# Patient Record
Sex: FEMALE | Race: WHITE | NOT HISPANIC OR LATINO | Employment: UNEMPLOYED | URBAN - NONMETROPOLITAN AREA
[De-identification: names, ages, dates, MRNs, and addresses within clinical notes are randomized per-mention and may not be internally consistent; named-entity substitution may affect disease eponyms.]

---

## 2017-03-13 RX ORDER — AMLODIPINE BESYLATE AND BENAZEPRIL HYDROCHLORIDE 5; 20 MG/1; MG/1
CAPSULE ORAL
Qty: 30 CAPSULE | Refills: 5 | Status: SHIPPED | OUTPATIENT
Start: 2017-03-13 | End: 2017-05-17 | Stop reason: SDUPTHER

## 2017-03-14 ENCOUNTER — OFFICE VISIT (OUTPATIENT)
Dept: FAMILY MEDICINE CLINIC | Facility: CLINIC | Age: 52
End: 2017-03-14

## 2017-03-14 VITALS
SYSTOLIC BLOOD PRESSURE: 148 MMHG | DIASTOLIC BLOOD PRESSURE: 80 MMHG | HEIGHT: 60 IN | BODY MASS INDEX: 30.04 KG/M2 | WEIGHT: 153 LBS

## 2017-03-14 DIAGNOSIS — J06.9 ACUTE URI: ICD-10-CM

## 2017-03-14 DIAGNOSIS — M54.42 LOW BACK PAIN WITH BILATERAL SCIATICA, UNSPECIFIED BACK PAIN LATERALITY, UNSPECIFIED CHRONICITY: Primary | ICD-10-CM

## 2017-03-14 DIAGNOSIS — M54.41 LOW BACK PAIN WITH BILATERAL SCIATICA, UNSPECIFIED BACK PAIN LATERALITY, UNSPECIFIED CHRONICITY: Primary | ICD-10-CM

## 2017-03-14 PROCEDURE — 96372 THER/PROPH/DIAG INJ SC/IM: CPT | Performed by: FAMILY MEDICINE

## 2017-03-14 PROCEDURE — 99214 OFFICE O/P EST MOD 30 MIN: CPT | Performed by: FAMILY MEDICINE

## 2017-03-14 RX ORDER — AZITHROMYCIN 250 MG/1
TABLET, FILM COATED ORAL
Qty: 6 TABLET | Refills: 0 | Status: SHIPPED | OUTPATIENT
Start: 2017-03-14 | End: 2017-03-19

## 2017-03-14 RX ORDER — METHYLPREDNISOLONE ACETATE 80 MG/ML
80 INJECTION, SUSPENSION INTRA-ARTICULAR; INTRALESIONAL; INTRAMUSCULAR; SOFT TISSUE ONCE
Status: COMPLETED | OUTPATIENT
Start: 2017-03-14 | End: 2017-03-14

## 2017-03-14 RX ORDER — PREGABALIN 50 MG/1
50 CAPSULE ORAL 3 TIMES DAILY
Qty: 90 CAPSULE | Refills: 2 | Status: SHIPPED | OUTPATIENT
Start: 2017-03-14 | End: 2017-06-15 | Stop reason: SDUPTHER

## 2017-03-14 RX ORDER — ALBUTEROL SULFATE 90 UG/1
2 AEROSOL, METERED RESPIRATORY (INHALATION) EVERY 6 HOURS PRN
Qty: 1 INHALER | Refills: 3 | Status: SHIPPED | OUTPATIENT
Start: 2017-03-14 | End: 2017-08-28 | Stop reason: SDUPTHER

## 2017-03-14 RX ADMIN — METHYLPREDNISOLONE ACETATE 80 MG: 80 INJECTION, SUSPENSION INTRA-ARTICULAR; INTRALESIONAL; INTRAMUSCULAR; SOFT TISSUE at 11:58

## 2017-03-14 NOTE — PROGRESS NOTES
Subjective   Isabel Villalba is a 51 y.o. female.  Patient is here today for refills of medication and follow-up.  She would like referral to pain management.  She was recently discharged after having been in pain management for about a year in [place].  She's had no recent was given on the latter and she is not sure why this occurred.  She still has chronic daily low back pain and would like to refill her meds today including the muscle relaxer in the Lyrica    She also has an upper respiratory infection for about a week with cough wheezing congestion and drainage.  Cough is yellowish sputum at times.  There's been no fever    History of Present Illness     The following portions of the patient's history were reviewed and updated as appropriate: allergies, current medications, past family history, past medical history, past social history, past surgical history and problem list.    Review of Systems   Constitutional: Positive for fatigue.   HENT: Positive for congestion, postnasal drip, rhinorrhea, sinus pressure and sore throat.    Respiratory: Positive for cough and wheezing. Negative for shortness of breath.    Cardiovascular: Negative.  Negative for chest pain.   Gastrointestinal: Negative.    Musculoskeletal: Positive for arthralgias, back pain and myalgias.   Skin: Negative.    Allergic/Immunologic: Negative for immunocompromised state.   Neurological: Negative for dizziness, tremors, seizures, syncope, weakness and numbness.   Hematological: Negative.    Psychiatric/Behavioral: Negative for agitation, confusion, dysphoric mood and sleep disturbance. The patient is not nervous/anxious.        Objective   Physical Exam   Constitutional: She is oriented to person, place, and time. She appears well-developed and well-nourished.   HENT:   Head: Normocephalic and atraumatic.   Nose: Nose normal.   Mouth/Throat: Oropharynx is clear and moist.   Tympanic membranes mildly retracted and opaque, nasal mucosa  mildly red and swollen with posterior pharyngeal lymphoid hyperplasia   Eyes: Conjunctivae and EOM are normal. Pupils are equal, round, and reactive to light.   Neck: Normal range of motion. Neck supple. No JVD present. No tracheal deviation present. No thyromegaly present.   Cardiovascular: Normal rate, regular rhythm, normal heart sounds and intact distal pulses.    No murmur heard.  Pulmonary/Chest: Effort normal and breath sounds normal. She has no wheezes.   Abdominal: Soft. Bowel sounds are normal. She exhibits no distension. There is no tenderness.   Musculoskeletal: Normal range of motion. She exhibits no edema.   Lymphadenopathy:     She has no cervical adenopathy.   Neurological: She is alert and oriented to person, place, and time. Coordination normal.   Skin: Skin is warm and dry. No rash noted.   Psychiatric: She has a normal mood and affect.   Nursing note and vitals reviewed.      Assessment/Plan   Isabel was seen today for med refill.    Diagnoses and all orders for this visit:    Low back pain with bilateral sciatica, unspecified back pain laterality, unspecified chronicity  -     Ambulatory Referral to Pain Management  -     methylPREDNISolone acetate (DEPO-medrol) injection 80 mg; Inject 1 mL into the shoulder, thigh, or buttocks 1 (One) Time.    Acute URI    Other orders  -     azithromycin (ZITHROMAX) 250 MG tablet; Take 2 tablets the first day, then 1 tablet daily for 4 days.  -     albuterol (VENTOLIN HFA) 108 (90 BASE) MCG/ACT inhaler; Inhale 2 puffs Every 6 (Six) Hours As Needed for Wheezing or shortness of air.  -     pregabalin (LYRICA) 50 MG capsule; Take 1 capsule by mouth 3 (Three) Times a Day.     pain management referral was made and Depo-Medrol was given today for the pain.  We'll give Lyrica and Zanaflex refills as well    Give Zithromax and albuterol for the upper respiratory symptoms and contact me if not improving within 3-5 days.  She is a long-term smoker and I suspect some  degree of COPD is contributing.

## 2017-03-23 ENCOUNTER — OFFICE VISIT (OUTPATIENT)
Dept: SURGERY | Facility: CLINIC | Age: 52
End: 2017-03-23

## 2017-03-23 VITALS
HEIGHT: 60 IN | WEIGHT: 155 LBS | BODY MASS INDEX: 30.43 KG/M2 | DIASTOLIC BLOOD PRESSURE: 84 MMHG | SYSTOLIC BLOOD PRESSURE: 142 MMHG

## 2017-03-23 DIAGNOSIS — Z87.898 HX OF ABNORMAL MAMMOGRAM: Primary | ICD-10-CM

## 2017-03-23 PROCEDURE — 99212 OFFICE O/P EST SF 10 MIN: CPT | Performed by: SURGERY

## 2017-03-30 NOTE — PROGRESS NOTES
CHIEF COMPLAINT:    3 month Follow up from left breast biopsy    HISTORY OF PRESENT ILLNESS:    Isabel Villalba is a 51 y.o. female who underwent a left breast mammotome biopsy in November.  That biopsy showed cystic mastopathy with microcalcifications.  She did well post biopsy.  She is here for review of her follow-up imaging.  Left sided mammogram was interpreted as BI-RADS 2, and on review shows appropriate clip position as well.    The patient has no new breast related complaints.  She has noted no changes in her breasts, no pain, no discharge.    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   • Epigastric pain    • Essential hypertension    • Fatigue    • Gastritis    • Gastroesophageal reflux disease    • Gouty arthropathy     left first MTP joint   • H/O screening mammography 06/24/2014   • Hernia of abdominal wall    • Hyperlipidemia    • Irreducible incisional hernia     vs lipoma   • Lumbago with sciatica    • Lumbago-sciatica due to displacement of lumbar intervertebral disc    • Protrusion of cervical intervertebral disc     Broad based intervertebral disc protrusion - C5-6, minimally impressing upon ventral thecal sac by MRI done 4/7/15    • Pruritic rash    • Systolic murmur    • Vitamin B12 deficiency    • Weight increase        Past Surgical History:   Procedure Laterality Date   • BREAST BIOPSY     • CARPAL TUNNEL RELEASE Bilateral 1995   • CHOLECYSTECTOMY  2004   • INJECTION OF MEDICATION  11/17/2015    Bicillin LA 1.2   • INJECTION OF MEDICATION  11/17/2015    depo medrol   • TUBAL ABDOMINAL LIGATION  2004         Current Outpatient Prescriptions:   •  albuterol (VENTOLIN HFA) 108 (90 BASE) MCG/ACT inhaler, Inhale 2 puffs Every 6 (Six) Hours As Needed for Wheezing or shortness of air., Disp: 1 inhaler, Rfl: 3  •  amLODIPine-benazepril (LOTREL 5-20) 5-20 MG per capsule, TAKE 1 CAPSULE BY MOUTH  DAILY., Disp: 30 capsule, Rfl: 5  •  benzonatate (TESSALON) 100 MG capsule, Take 100 mg by mouth 3 (Three) Times a Day As Needed for cough., Disp: , Rfl:   •  fluticasone (FLONASE) 50 MCG/ACT nasal spray, 2 sprays into each nostril Daily., Disp: , Rfl:   •  HYDROcodone-acetaminophen (NORCO) 5-325 MG per tablet, Take 1 tablet by mouth Every 6 (Six) Hours As Needed., Disp: , Rfl:   •  loratadine (CLARITIN) 10 MG tablet, Take 10 mg by mouth Daily., Disp: , Rfl:   •  meloxicam (MOBIC) 15 MG tablet, Take 15 mg by mouth Daily., Disp: , Rfl:   •  omeprazole (PriLOSEC) 20 MG capsule, Take 20 mg by mouth 2 (Two) Times a Day., Disp: , Rfl:   •  pregabalin (LYRICA) 50 MG capsule, Take 1 capsule by mouth 3 (Three) Times a Day., Disp: 90 capsule, Rfl: 2  •  tiZANidine (ZANAFLEX) 4 MG tablet, Take 4 mg by mouth Every 6 (Six) Hours As Needed for muscle spasms., Disp: , Rfl:     Allergies   Allergen Reactions   • Morphine And Related Nausea And Vomiting       Family History   Problem Relation Age of Onset   • ADD / ADHD Other    • Bipolar disorder Other    • Breast cancer Other    • Cancer Other    • Coronary artery disease Other    • Diabetes Other    • Heart disease Other    • Hyperlipidemia Other    • Hypertension Other    • Lung cancer Other    • Migraines Other    • Polycystic ovary syndrome Other    • Schizophrenia Other    • Stroke Other    • Tuberculosis Other    • Other Other      respiratory disorder; smoking tobacco   • Breast cancer Paternal Grandmother        Social History     Social History   • Marital status:      Spouse name: N/A   • Number of children: N/A   • Years of education: N/A     Occupational History   • Not on file.     Social History Main Topics   • Smoking status: Current Every Day Smoker     Packs/day: 1.00     Types: Cigarettes   • Smokeless tobacco: Never Used   • Alcohol use No   • Drug use: No   • Sexual activity: Not on file     Other Topics Concern   • Not on file     Social History  "Narrative       Review of Systems   Constitutional: Negative for appetite change, chills, fever and unexpected weight change.   HENT: Negative for hearing loss, nosebleeds and trouble swallowing.    Eyes: Negative for visual disturbance.   Respiratory: Negative for apnea, cough, choking, chest tightness, shortness of breath, wheezing and stridor.    Cardiovascular: Negative for chest pain, palpitations and leg swelling.   Gastrointestinal: Negative for abdominal distention, abdominal pain, blood in stool, constipation, diarrhea, nausea and vomiting.   Endocrine: Negative for cold intolerance, heat intolerance, polydipsia, polyphagia and polyuria.   Genitourinary: Negative for difficulty urinating, dysuria, frequency, hematuria and urgency.   Musculoskeletal: Negative for arthralgias, back pain, myalgias and neck pain.   Skin: Negative for color change, pallor and rash.   Allergic/Immunologic: Negative for immunocompromised state.   Neurological: Negative for dizziness, seizures, syncope, light-headedness, numbness and headaches.   Hematological: Negative for adenopathy.   Psychiatric/Behavioral: Negative for suicidal ideas. The patient is not nervous/anxious.        Objective     /84  Ht 60\" (152.4 cm)  Wt 155 lb (70.3 kg)  BMI 30.27 kg/m2    Physical Exam   Constitutional: She is oriented to person, place, and time. She appears well-developed and well-nourished. No distress.   HENT:   Head: Normocephalic and atraumatic.   Eyes: Conjunctivae and EOM are normal. Pupils are equal, round, and reactive to light. Right eye exhibits no discharge. Left eye exhibits no discharge.   Neck: Normal range of motion. Neck supple. No JVD present. No tracheal deviation present. No thyromegaly present.   Cardiovascular: Normal rate, regular rhythm and normal heart sounds.  Exam reveals no gallop and no friction rub.    No murmur heard.  Pulmonary/Chest: Effort normal and breath sounds normal. No respiratory distress. She " has no wheezes. She has no rales. She exhibits no tenderness.   Abdominal: Soft. She exhibits no distension and no mass. There is no tenderness. There is no rebound and no guarding. No hernia.   Musculoskeletal: Normal range of motion. She exhibits no edema, tenderness or deformity.   Neurological: She is alert and oriented to person, place, and time. No cranial nerve deficit.   Skin: Skin is warm and dry. No rash noted. She is not diaphoretic. No erythema. No pallor.   Psychiatric: She has a normal mood and affect. Her behavior is normal. Judgment and thought content normal.       DIAGNOSTIC DATA:    Mammogram and report reviewed showing biopsy site with clip in place, benign findings.    ASSESSMENT:    3 month follow-up from a benign breast biopsy    PLAN:    Her repeat imaging and previous pathology were reviewed with her today.  Encouraged continued breast exams at home.  She will return to annual mammogram for screening purposes.  She can follow-up with me if needed based on these results.          This document has been electronically signed by Jeancarlos Ojeda MD on March 30, 2017 3:37 PM

## 2017-05-17 RX ORDER — AMLODIPINE BESYLATE AND BENAZEPRIL HYDROCHLORIDE 5; 20 MG/1; MG/1
CAPSULE ORAL
Qty: 30 CAPSULE | Refills: 5 | Status: SHIPPED | OUTPATIENT
Start: 2017-05-17 | End: 2017-08-28 | Stop reason: SDUPTHER

## 2017-05-18 RX ORDER — OMEPRAZOLE 20 MG/1
20 CAPSULE, DELAYED RELEASE ORAL 2 TIMES DAILY
Qty: 60 CAPSULE | Refills: 5 | Status: SHIPPED | OUTPATIENT
Start: 2017-05-18 | End: 2017-08-28 | Stop reason: SDUPTHER

## 2017-05-18 RX ORDER — TIZANIDINE 4 MG/1
4 TABLET ORAL EVERY 6 HOURS PRN
Qty: 30 TABLET | Refills: 2 | Status: SHIPPED | OUTPATIENT
Start: 2017-05-18 | End: 2017-06-15 | Stop reason: SDUPTHER

## 2017-06-15 ENCOUNTER — LAB (OUTPATIENT)
Dept: LAB | Facility: OTHER | Age: 52
End: 2017-06-15

## 2017-06-15 ENCOUNTER — OFFICE VISIT (OUTPATIENT)
Dept: FAMILY MEDICINE CLINIC | Facility: CLINIC | Age: 52
End: 2017-06-15

## 2017-06-15 VITALS
WEIGHT: 155 LBS | SYSTOLIC BLOOD PRESSURE: 138 MMHG | HEIGHT: 60 IN | DIASTOLIC BLOOD PRESSURE: 80 MMHG | BODY MASS INDEX: 30.43 KG/M2

## 2017-06-15 DIAGNOSIS — E78.5 HYPERLIPIDEMIA, UNSPECIFIED HYPERLIPIDEMIA TYPE: ICD-10-CM

## 2017-06-15 DIAGNOSIS — E53.8 VITAMIN B12 DEFICIENCY: ICD-10-CM

## 2017-06-15 DIAGNOSIS — I10 ESSENTIAL HYPERTENSION: ICD-10-CM
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
## 2017-06-15 DIAGNOSIS — E78.5 HYPERLIPIDEMIA, UNSPECIFIED HYPERLIPIDEMIA TYPE: Primary | ICD-10-CM

## 2017-06-15 DIAGNOSIS — I10 ESSENTIAL HYPERTENSION: Primary | ICD-10-CM

## 2017-06-15 DIAGNOSIS — M25.50 MULTIPLE JOINT PAIN: ICD-10-CM

## 2017-06-15 LAB
ALBUMIN SERPL-MCNC: 4.3 G/DL (ref 3.2–5.5)
ALBUMIN/GLOB SERPL: 1.2 G/DL (ref 1–3)
ALP SERPL-CCNC: 61 U/L (ref 15–121)
ALT SERPL W P-5'-P-CCNC: 34 U/L (ref 10–60)
ANION GAP SERPL CALCULATED.3IONS-SCNC: 9 MMOL/L (ref 5–15)
AST SERPL-CCNC: 29 U/L (ref 10–60)
BASOPHILS # BLD AUTO: 0.03 10*3/MM3 (ref 0–0.2)
BASOPHILS NFR BLD AUTO: 0.3 % (ref 0–2)
BILIRUB SERPL-MCNC: 0.4 MG/DL (ref 0.2–1)
BUN BLD-MCNC: 7 MG/DL (ref 8–25)
BUN/CREAT SERPL: 14 (ref 7–25)
CALCIUM SPEC-SCNC: 9.6 MG/DL (ref 8.4–10.8)
CHLORIDE SERPL-SCNC: 104 MMOL/L (ref 100–112)
CHOLEST SERPL-MCNC: 288 MG/DL (ref 150–200)
CO2 SERPL-SCNC: 28 MMOL/L (ref 20–32)
CREAT BLD-MCNC: 0.5 MG/DL (ref 0.4–1.3)
DEPRECATED RDW RBC AUTO: 42 FL (ref 36.4–46.3)
EOSINOPHIL # BLD AUTO: 0.04 10*3/MM3 (ref 0–0.7)
EOSINOPHIL NFR BLD AUTO: 0.4 % (ref 0–7)
ERYTHROCYTE [DISTWIDTH] IN BLOOD BY AUTOMATED COUNT: 13.6 % (ref 11.5–14.5)
GFR SERPL CREATININE-BSD FRML MDRD: 130 ML/MIN/1.73 (ref 51–120)
GLOBULIN UR ELPH-MCNC: 3.7 GM/DL (ref 2.5–4.6)
GLUCOSE BLD-MCNC: 118 MG/DL (ref 70–100)
HCT VFR BLD AUTO: 43.2 % (ref 35–45)
HDLC SERPL-MCNC: 33 MG/DL (ref 35–100)
HGB BLD-MCNC: 14.5 G/DL (ref 12–15.5)
LDLC SERPL CALC-MCNC: 185 MG/DL
LDLC/HDLC SERPL: 5.6 {RATIO}
LYMPHOCYTES # BLD AUTO: 3.28 10*3/MM3 (ref 0.6–4.2)
LYMPHOCYTES NFR BLD AUTO: 35.5 % (ref 10–50)
MCH RBC QN AUTO: 29.1 PG (ref 26.5–34)
MCHC RBC AUTO-ENTMCNC: 33.6 G/DL (ref 31.4–36)
MCV RBC AUTO: 86.7 FL (ref 80–98)
MONOCYTES # BLD AUTO: 0.7 10*3/MM3 (ref 0–0.9)
MONOCYTES NFR BLD AUTO: 7.6 % (ref 0–12)
NEUTROPHILS # BLD AUTO: 5.2 10*3/MM3 (ref 2–8.6)
NEUTROPHILS NFR BLD AUTO: 56.2 % (ref 37–80)
PLATELET # BLD AUTO: 351 10*3/MM3 (ref 150–450)
PMV BLD AUTO: 9.5 FL (ref 8–12)
POTASSIUM BLD-SCNC: 4 MMOL/L (ref 3.4–5.4)
PROT SERPL-MCNC: 8 G/DL (ref 6.7–8.2)
RBC # BLD AUTO: 4.98 10*6/MM3 (ref 3.77–5.16)
SODIUM BLD-SCNC: 141 MMOL/L (ref 134–146)
TRIGL SERPL-MCNC: 351 MG/DL (ref 35–160)
VLDLC SERPL-MCNC: 70.2 MG/DL
WBC NRBC COR # BLD: 9.25 10*3/MM3 (ref 3.2–9.8)

## 2017-06-15 PROCEDURE — 82746 ASSAY OF FOLIC ACID SERUM: CPT | Performed by: FAMILY MEDICINE

## 2017-06-15 PROCEDURE — 84439 ASSAY OF FREE THYROXINE: CPT | Performed by: FAMILY MEDICINE

## 2017-06-15 PROCEDURE — 82607 VITAMIN B-12: CPT | Performed by: FAMILY MEDICINE

## 2017-06-15 PROCEDURE — 86618 LYME DISEASE ANTIBODY: CPT | Performed by: FAMILY MEDICINE

## 2017-06-15 PROCEDURE — 85025 COMPLETE CBC W/AUTO DIFF WBC: CPT | Performed by: FAMILY MEDICINE

## 2017-06-15 PROCEDURE — 84443 ASSAY THYROID STIM HORMONE: CPT | Performed by: FAMILY MEDICINE

## 2017-06-15 PROCEDURE — 80053 COMPREHEN METABOLIC PANEL: CPT | Performed by: FAMILY MEDICINE

## 2017-06-15 PROCEDURE — 80061 LIPID PANEL: CPT | Performed by: FAMILY MEDICINE

## 2017-06-15 PROCEDURE — 99214 OFFICE O/P EST MOD 30 MIN: CPT | Performed by: FAMILY MEDICINE

## 2017-06-15 RX ORDER — PREGABALIN 50 MG/1
50 CAPSULE ORAL 3 TIMES DAILY
Qty: 90 CAPSULE | Refills: 2 | Status: SHIPPED | OUTPATIENT
Start: 2017-06-15 | End: 2018-04-10

## 2017-06-15 RX ORDER — ATORVASTATIN CALCIUM 10 MG/1
10 TABLET, FILM COATED ORAL DAILY
Qty: 30 TABLET | Refills: 5 | Status: SHIPPED | OUTPATIENT
Start: 2017-06-15 | End: 2017-08-28 | Stop reason: SDUPTHER

## 2017-06-15 RX ORDER — TIZANIDINE 4 MG/1
4 TABLET ORAL EVERY 6 HOURS PRN
Qty: 30 TABLET | Refills: 2 | Status: SHIPPED | OUTPATIENT
Start: 2017-06-15 | End: 2017-08-28 | Stop reason: SDUPTHER

## 2017-06-15 NOTE — PROGRESS NOTES
Please call the patient regarding her abnormal result.  Cholesterol is very high.  I recommend starting medication.  Send atorvastatin 10 mg daily, recheck in 3 months for CMP and an LDL

## 2017-06-15 NOTE — PROGRESS NOTES
Subjective   Isabel Villalba is a 51 y.o. female.  Patient is here today for refills of medication and follow-up.  She is scheduled with pain management in [place] in a couple weeks.  She still has chronic daily low back pain and would like to refill her meds today including the muscle relaxer and the Lyrica.   She has also had several tick bites and has had increasing joint pain and some constitutional symptoms.    History of Present Illness   Back Pain   This is a chronic problem. The current episode started more than 1 year ago. The problem occurs constantly. The problem is unchanged. The pain is present in the lumbar spine. The quality of the pain is described as shooting, stabbing and aching. The pain radiates down the legs. The pain is at a severity of 8/10. The pain is severe. The pain is the same all the time. The symptoms are aggravated by standing, twisting, position, bending and sitting. Stiffness is present at night, all day and in the morning. Associated symptoms include leg pain and tingling. Pertinent negatives include no abdominal pain, bladder incontinence, bowel incontinence, chest pain, dysuria, fever, headaches, numbness, paresis, paresthesias, pelvic pain, perianal numbness, weakness or weight loss. Risk factors include sedentary lifestyle. Patient has tried analgesics, NSAIDs, muscle relaxant and heat for the symptoms. The treatment provided mild relief.     The following portions of the patient's history were reviewed and updated as appropriate: allergies, current medications, past family history, past medical history, past social history, past surgical history and problem list.    Review of Systems   Constitutional: Positive for fatigue.   HENT: Positive for congestion, postnasal drip, rhinorrhea, sinus pressure and sore throat.    Respiratory: Positive for cough and wheezing. Negative for shortness of breath.    Cardiovascular: Negative.  Negative for chest pain.    Gastrointestinal: Negative.    Musculoskeletal: Positive for arthralgias, back pain and myalgias.   Skin: Negative.    Allergic/Immunologic: Negative for immunocompromised state.   Neurological: Negative for dizziness, tremors, seizures, syncope, weakness and numbness.   Hematological: Negative.    Psychiatric/Behavioral: Negative for agitation, confusion, dysphoric mood and sleep disturbance. The patient is not nervous/anxious.      Objective   Physical Exam   Constitutional: She is oriented to person, place, and time. She appears well-developed and well-nourished.   HENT:   Head: Normocephalic and atraumatic.   Nose: Nose normal.   Mouth/Throat: Oropharynx is clear and moist.   Tympanic membranes mildly retracted and opaque, nasal mucosa mildly red and swollen with posterior pharyngeal lymphoid hyperplasia   Eyes: Conjunctivae and EOM are normal. Pupils are equal, round, and reactive to light.   Neck: Normal range of motion. Neck supple. No JVD present. No tracheal deviation present. No thyromegaly present.   Cardiovascular: Normal rate, regular rhythm, normal heart sounds and intact distal pulses.    No murmur heard.  Pulmonary/Chest: Effort normal and breath sounds normal. She has no wheezes.   Abdominal: Soft. Bowel sounds are normal. She exhibits no distension. There is no tenderness.   Musculoskeletal: Normal range of motion. She exhibits no edema.   Lymphadenopathy:     She has no cervical adenopathy.   Neurological: She is alert and oriented to person, place, and time. Coordination normal.   Skin: Skin is warm and dry. No rash noted.   Psychiatric: She has a normal mood and affect.   Nursing note and vitals reviewed.    Assessment/Plan   Isabel was seen today for med refill.    Diagnoses and all orders for this visit:    Essential hypertension  -     CBC & Differential; Future  -     Comprehensive Metabolic Panel    Hyperlipidemia, unspecified hyperlipidemia type  -     T4, Free  -     TSH  -     Lipid  Panel; Future    Vitamin B12 deficiency  -     Vitamin B12 & Folate    Tick bites, subsequent encounter  -     Cancel: Lyme IgG / IgM Ab  -     [facility] (IgG / M)  -     Lyme, Total Antibody Test / Reflex; Future    Multiple joint pain    Other orders  -     pregabalin (LYRICA) 50 MG capsule; Take 1 capsule by mouth 3 (Three) Times a Day.  -     tiZANidine (ZANAFLEX) 4 MG tablet; Take 1 tablet by mouth Every 6 (Six) Hours As Needed for Muscle Spasms.    We'll give Lyrica and Zanaflex refills     Follow up with pain management as scheduled.     She is due for labs and will include a tick panel as above.     The patient has read and signed the [facility] Controlled Substance Contract.  I will continue to see patient for regular follow up appointments. Patient is well controlled on the medication.  DEYVI has been reviewed by me and is updated every 3 months. The patient is aware of the potential for addiction and dependence.

## 2017-06-16 LAB
FOLATE SERPL-MCNC: 5.03 NG/ML (ref 2.76–21)
T4 FREE SERPL-MCNC: 1.09 NG/DL (ref 0.78–2.19)
TSH SERPL DL<=0.05 MIU/L-ACNC: 0.83 MIU/ML (ref 0.46–4.68)
VIT B12 BLD-MCNC: 476 PG/ML (ref 239–931)

## 2017-06-17 LAB — B BURGDOR IGG+IGM SER-ACNC: <0.91 ISR (ref 0–0.9)

## 2017-06-20 LAB
R RICKETTSI IGM TITR SER: 0.54 INDEX (ref 0–0.89)
RICK SF IGG TITR SER IF: NEGATIVE {TITER}

## 2017-07-25 ENCOUNTER — OFFICE VISIT (OUTPATIENT)
Dept: PAIN MEDICINE | Facility: CLINIC | Age: 52
End: 2017-07-25

## 2017-07-25 VITALS
BODY MASS INDEX: 30.77 KG/M2 | WEIGHT: 156.7 LBS | HEIGHT: 60 IN | SYSTOLIC BLOOD PRESSURE: 140 MMHG | DIASTOLIC BLOOD PRESSURE: 80 MMHG

## 2017-07-25 DIAGNOSIS — M54.41 CHRONIC BILATERAL LOW BACK PAIN WITH BILATERAL SCIATICA: Primary | ICD-10-CM

## 2017-07-25 DIAGNOSIS — G89.29 CHRONIC BILATERAL LOW BACK PAIN WITH BILATERAL SCIATICA: Primary | ICD-10-CM

## 2017-07-25 DIAGNOSIS — M79.18 MYOFASCIAL PAIN: ICD-10-CM

## 2017-07-25 DIAGNOSIS — M54.42 CHRONIC BILATERAL LOW BACK PAIN WITH BILATERAL SCIATICA: Primary | ICD-10-CM

## 2017-07-25 PROCEDURE — 99204 OFFICE O/P NEW MOD 45 MIN: CPT | Performed by: PAIN MEDICINE

## 2017-07-25 NOTE — PROGRESS NOTES
"Isabel Villalba is a 51 y.o. female.   1965    HPI:   Location: lower back, bilateral leg and left foot  Quality: burning, stabbing, shooting and aching  Severity: 8/10  Timing: constant  Alleviating: nothing  Aggravating: increased activity, weather and anything to long     Low back pain with bilateral burning in what appears to be an approx L4 distribution.  Has had an MRI in the past which showed mild to mod NF stenosis at l5-s1. States it is worse and constant now.  This started after she bent over to  something in the yard.  This was in approx 2014 (when last mri was obtained).  PT within the last year which did not help much.  Still does exercises learned in PT approx 3 times a week.  No chiropractic for this.  Saw a pain clinic in [place] and was discharged because of \"non-compliance\".  Had injections last a couple of years ago.  States this did not help much at the time.  These were epidural steroid injections.  Takes lyrica 50 po tid from pcp.  She takes zanaflex which helps with sleep some.  NSAIDS have caused stomach issues in the past.  Was on gabapentin prior to lyrica but did not help.  Lyrica causes no side effects.  No loss of bowel or bladder control.       The following portions of the patient's history were reviewed by me and updated as appropriate: allergies, current medications, past family history, past medical history, past social history, past surgical history and problem list.    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   • Epigastric pain    • Essential hypertension    • Fatigue    • Gastritis    • Gastroesophageal reflux disease    • Gouty arthropathy     left first MTP joint   • H/O screening mammography 06/24/2014   • Hernia of abdominal wall    • Hyperlipidemia    • Irreducible incisional hernia     vs lipoma   • Lumbago with sciatica    • Lumbago-sciatica due to " displacement of lumbar intervertebral disc    • Protrusion of cervical intervertebral disc     Broad based intervertebral disc protrusion - C5-6, minimally impressing upon ventral thecal sac by MRI done 4/7/15    • Pruritic rash    • Systolic murmur    • Vitamin B12 deficiency    • Weight increase        Social History     Social History   • Marital status:      Spouse name: N/A   • Number of children: N/A   • Years of education: N/A     Occupational History   • Not on file.     Social History Main Topics   • Smoking status: Current Every Day Smoker     Packs/day: 1.00     Types: Cigarettes   • Smokeless tobacco: Never Used   • Alcohol use No   • Drug use: No   • Sexual activity: Defer     Other Topics Concern   • Not on file     Social History Narrative       Family History   Problem Relation Age of Onset   • ADD / ADHD Other    • Bipolar disorder Other    • Breast cancer Other    • Cancer Other    • Coronary artery disease Other    • Diabetes Other    • Heart disease Other    • Hyperlipidemia Other    • Hypertension Other    • Lung cancer Other    • Migraines Other    • Polycystic ovary syndrome Other    • Schizophrenia Other    • Stroke Other    • Tuberculosis Other    • Other Other      respiratory disorder; smoking tobacco   • Breast cancer Paternal Grandmother    • Hypertension Mother    • Cancer Father          Current Outpatient Prescriptions:   •  albuterol (VENTOLIN HFA) 108 (90 BASE) MCG/ACT inhaler, Inhale 2 puffs Every 6 (Six) Hours As Needed for Wheezing or shortness of air., Disp: 1 inhaler, Rfl: 3  •  amLODIPine-benazepril (LOTREL 5-20) 5-20 MG per capsule, TAKE ONE CAPSULE BY MOUTH DAILY, Disp: 30 capsule, Rfl: 5  •  atorvastatin (LIPITOR) 10 MG tablet, Take 1 tablet by mouth Daily., Disp: 30 tablet, Rfl: 5  •  fluticasone (FLONASE) 50 MCG/ACT nasal spray, 2 sprays into each nostril Daily., Disp: , Rfl:   •  loratadine (CLARITIN) 10 MG tablet, Take 10 mg by mouth Daily., Disp: , Rfl:   •   omeprazole (priLOSEC) 20 MG capsule, Take 1 capsule by mouth 2 (Two) Times a Day., Disp: 60 capsule, Rfl: 5  •  pregabalin (LYRICA) 50 MG capsule, Take 1 capsule by mouth 3 (Three) Times a Day., Disp: 90 capsule, Rfl: 2  •  tiZANidine (ZANAFLEX) 4 MG tablet, Take 1 tablet by mouth Every 6 (Six) Hours As Needed for Muscle Spasms., Disp: 30 tablet, Rfl: 2  •  meloxicam (MOBIC) 15 MG tablet, Take 15 mg by mouth Daily., Disp: , Rfl:     Allergies   Allergen Reactions   • Morphine And Related Nausea And Vomiting       Review of Systems   Musculoskeletal: Positive for back pain (lower).        Bilateral leg pain left foot pain    All other systems reviewed and are negative.    All review of systems reviewed and negative except for above.    Physical Exam   Constitutional: She is oriented to person, place, and time. She appears well-developed and well-nourished. She does not appear ill. No distress.   HENT:   Head: Normocephalic and atraumatic.   Right Ear: Hearing normal.   Left Ear: Hearing normal.   Eyes: Conjunctivae and EOM are normal. Pupils are equal, round, and reactive to light.   Neck: Normal range of motion and full passive range of motion without pain. Neck supple. No muscular tenderness present. Normal range of motion present.   Cardiovascular: Normal rate, regular rhythm and normal heart sounds.    Pulmonary/Chest: Effort normal and breath sounds normal.   Abdominal: Soft. Bowel sounds are normal. There is no tenderness.   Musculoskeletal:        Lumbar back: She exhibits decreased range of motion (flexion full, ext to approx 10 deg with bilateral facet loading. ).   Neurological: She is alert and oriented to person, place, and time. She has normal reflexes. She displays normal reflexes. No cranial nerve deficit or sensory deficit. She exhibits normal muscle tone. Coordination and gait normal.   Neg slr b   Skin: Skin is warm and dry. No rash noted. No erythema.   Psychiatric: She has a normal mood and  affect. Her behavior is normal. Judgment normal.   Vitals reviewed.      Isabel was seen today for leg pain, back pain and foot pain.    Diagnoses and all orders for this visit:    Chronic bilateral low back pain with bilateral sciatica  -     MRI Lumbar Spine Without Contrast; Future  -     XR Spine Lumbar 4+ View; Future    Myofascial pain      Medication: None at this time.  I do not mind to take over lyrica for symptoms of radiculitis.  She will discuss with her PCP.  I do not plan long term opioids at this time.  Concerned about d/c from other pain practice and min findings on old MRI.  Neurologically intact.  Asked to request old records from [place].     Interventional: none at this time.  MRI to eval worsened leg pain.   Pt has had PT in the past year with no improvement with continuation of the exercises.      Rehab: none at this time    Behavioral: No aberrant behavior noted. DEYVI Report #[id number]  was reviewed and is consistent with stated history    Urine drug screen None at this time    ORT: 3    PHQ-9: 9          This document has been electronically signed by Quincy Chávez MD on July 25, 2017 8:51 AM

## 2017-08-03 ENCOUNTER — HOSPITAL ENCOUNTER (OUTPATIENT)
Dept: MRI IMAGING | Facility: HOSPITAL | Age: 52
Discharge: HOME | End: 2017-08-03
Attending: PAIN MEDICINE | Admitting: PAIN MEDICINE

## 2017-08-03 DIAGNOSIS — G89.29 CHRONIC BILATERAL LOW BACK PAIN WITH BILATERAL SCIATICA: ICD-10-CM

## 2017-08-03 DIAGNOSIS — M54.41 CHRONIC BILATERAL LOW BACK PAIN WITH BILATERAL SCIATICA: ICD-10-CM

## 2017-08-03 DIAGNOSIS — M54.42 CHRONIC BILATERAL LOW BACK PAIN WITH BILATERAL SCIATICA: ICD-10-CM

## 2017-08-03 PROCEDURE — 72148 MRI LUMBAR SPINE W/O DYE: CPT

## 2017-08-17 RX ORDER — BENZONATATE 100 MG/1
CAPSULE ORAL
Qty: 30 CAPSULE | Refills: 5 | OUTPATIENT
Start: 2017-08-17

## 2017-08-28 ENCOUNTER — OFFICE VISIT (OUTPATIENT)
Dept: FAMILY MEDICINE CLINIC | Facility: CLINIC | Age: 52
End: 2017-08-28

## 2017-08-28 VITALS
DIASTOLIC BLOOD PRESSURE: 82 MMHG | SYSTOLIC BLOOD PRESSURE: 158 MMHG | WEIGHT: 157 LBS | BODY MASS INDEX: 30.82 KG/M2 | HEIGHT: 60 IN

## 2017-08-28 DIAGNOSIS — J06.9 ACUTE URI: ICD-10-CM

## 2017-08-28 DIAGNOSIS — M79.671 FOOT PAIN, RIGHT: ICD-10-CM

## 2017-08-28 DIAGNOSIS — M51.27 LUMBAGO-SCIATICA DUE TO DISPLACEMENT OF LUMBAR INTERVERTEBRAL DISC: Primary | ICD-10-CM

## 2017-08-28 PROCEDURE — 99214 OFFICE O/P EST MOD 30 MIN: CPT | Performed by: FAMILY MEDICINE

## 2017-08-28 RX ORDER — AMLODIPINE BESYLATE AND BENAZEPRIL HYDROCHLORIDE 5; 20 MG/1; MG/1
1 CAPSULE ORAL DAILY
Qty: 30 CAPSULE | Refills: 5 | Status: SHIPPED | OUTPATIENT
Start: 2017-08-28 | End: 2018-03-19 | Stop reason: SDUPTHER

## 2017-08-28 RX ORDER — ALBUTEROL SULFATE 90 UG/1
2 AEROSOL, METERED RESPIRATORY (INHALATION) EVERY 6 HOURS PRN
Qty: 1 INHALER | Refills: 3 | Status: SHIPPED | OUTPATIENT
Start: 2017-08-28 | End: 2018-09-06 | Stop reason: SDUPTHER

## 2017-08-28 RX ORDER — MELOXICAM 15 MG/1
15 TABLET ORAL DAILY
Qty: 30 TABLET | Refills: 5 | Status: SHIPPED | OUTPATIENT
Start: 2017-08-28 | End: 2018-09-06

## 2017-08-28 RX ORDER — TIZANIDINE 4 MG/1
4 TABLET ORAL EVERY 6 HOURS PRN
Qty: 30 TABLET | Refills: 2 | Status: SHIPPED | OUTPATIENT
Start: 2017-08-28 | End: 2018-03-07 | Stop reason: SDUPTHER

## 2017-08-28 RX ORDER — ATORVASTATIN CALCIUM 10 MG/1
10 TABLET, FILM COATED ORAL DAILY
Qty: 30 TABLET | Refills: 5 | Status: SHIPPED | OUTPATIENT
Start: 2017-08-28 | End: 2017-10-16 | Stop reason: SDUPTHER

## 2017-08-28 RX ORDER — OMEPRAZOLE 20 MG/1
20 CAPSULE, DELAYED RELEASE ORAL 2 TIMES DAILY
Qty: 60 CAPSULE | Refills: 5 | Status: SHIPPED | OUTPATIENT
Start: 2017-08-28 | End: 2018-09-06 | Stop reason: SDUPTHER

## 2017-08-28 RX ORDER — BENZONATATE 200 MG/1
200 CAPSULE ORAL 3 TIMES DAILY PRN
Qty: 15 CAPSULE | Refills: 2 | Status: SHIPPED | OUTPATIENT
Start: 2017-08-28 | End: 2017-10-16 | Stop reason: SDUPTHER

## 2017-08-28 RX ORDER — MONTELUKAST SODIUM 10 MG/1
10 TABLET ORAL NIGHTLY
Qty: 30 TABLET | Refills: 5 | Status: SHIPPED | OUTPATIENT
Start: 2017-08-28 | End: 2018-02-24

## 2017-08-28 RX ORDER — FLUTICASONE PROPIONATE 50 MCG
2 SPRAY, SUSPENSION (ML) NASAL DAILY
Qty: 1 EACH | Refills: 5 | Status: SHIPPED | OUTPATIENT
Start: 2017-08-28 | End: 2019-09-24 | Stop reason: SDUPTHER

## 2017-08-28 RX ORDER — LORATADINE 10 MG/1
10 TABLET ORAL DAILY
Qty: 30 TABLET | Refills: 5 | Status: SHIPPED | OUTPATIENT
Start: 2017-08-28 | End: 2017-08-28

## 2017-08-31 ENCOUNTER — OFFICE VISIT (OUTPATIENT)
Dept: PAIN MEDICINE | Facility: CLINIC | Age: 52
End: 2017-08-31

## 2017-08-31 VITALS
DIASTOLIC BLOOD PRESSURE: 88 MMHG | SYSTOLIC BLOOD PRESSURE: 142 MMHG | BODY MASS INDEX: 31.1 KG/M2 | WEIGHT: 158.4 LBS | HEIGHT: 60 IN

## 2017-08-31 DIAGNOSIS — M51.36 DDD (DEGENERATIVE DISC DISEASE), LUMBAR: ICD-10-CM

## 2017-08-31 DIAGNOSIS — G89.29 CHRONIC BILATERAL LOW BACK PAIN WITHOUT SCIATICA: Primary | ICD-10-CM

## 2017-08-31 DIAGNOSIS — M54.16 LUMBAR RADICULOPATHY: ICD-10-CM

## 2017-08-31 DIAGNOSIS — M54.50 CHRONIC BILATERAL LOW BACK PAIN WITHOUT SCIATICA: Primary | ICD-10-CM

## 2017-08-31 DIAGNOSIS — M79.18 MYOFASCIAL PAIN: ICD-10-CM

## 2017-08-31 PROCEDURE — 99213 OFFICE O/P EST LOW 20 MIN: CPT | Performed by: PAIN MEDICINE

## 2017-09-11 ENCOUNTER — OFFICE VISIT (OUTPATIENT)
Dept: PODIATRY | Facility: CLINIC | Age: 52
End: 2017-09-11

## 2017-09-11 VITALS
BODY MASS INDEX: 31.02 KG/M2 | DIASTOLIC BLOOD PRESSURE: 89 MMHG | WEIGHT: 158 LBS | HEART RATE: 77 BPM | OXYGEN SATURATION: 95 % | HEIGHT: 60 IN | SYSTOLIC BLOOD PRESSURE: 170 MMHG

## 2017-09-11 DIAGNOSIS — M79.674 PAIN OF TOE OF RIGHT FOOT: Primary | ICD-10-CM

## 2017-09-11 DIAGNOSIS — M20.5X1 HALLUX LIMITUS, RIGHT: ICD-10-CM

## 2017-09-11 PROCEDURE — 99203 OFFICE O/P NEW LOW 30 MIN: CPT | Performed by: PODIATRIST

## 2017-09-11 NOTE — PROGRESS NOTES
Isabel Villalba  1965  52 y.o. female   Patient presents today for right hallux pain.     9/11/17    Chief Complaint   Patient presents with   • Right Foot - Toe Pain           History of Present Illness    52-year-old female presents to clinic today with chief complaint of right foot pain.  Pain is located to the right great toe joint.  The pain has been present for greater than one year.  It is progressively getting worse.  She rates the pain as a 7 out of 10 and describes it as sharp.  Pain is increased with prolonged weightbearing and relieved with rest.  She has done nothing to treat it.  She denies any injuries.  She has no other pedal         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   • Epigastric pain    • Essential hypertension    • Fatigue    • Gastritis    • Gastroesophageal reflux disease    • Gouty arthropathy     left first MTP joint   • H/O screening mammography 06/24/2014   • Hernia of abdominal wall    • Hyperlipidemia    • Irreducible incisional hernia     vs lipoma   • Lumbago with sciatica    • Lumbago-sciatica due to displacement of lumbar intervertebral disc    • Protrusion of cervical intervertebral disc     Broad based intervertebral disc protrusion - C5-6, minimally impressing upon ventral thecal sac by MRI done 4/7/15    • Pruritic rash    • Systolic murmur    • Vitamin B12 deficiency    • Weight increase          Past Surgical History:   Procedure Laterality Date   • BREAST BIOPSY     • CARPAL TUNNEL RELEASE Bilateral 1995   • CHOLECYSTECTOMY  2004   • INJECTION OF MEDICATION  11/17/2015    Bicillin LA 1.2   • INJECTION OF MEDICATION  11/17/2015    depo medrol   • TUBAL ABDOMINAL LIGATION  2004         Family History   Problem Relation Age of Onset   • ADD / ADHD Other    • Bipolar disorder Other    • Breast cancer Other    • Cancer Other    • Coronary artery disease Other    •  Diabetes Other    • Heart disease Other    • Hyperlipidemia Other    • Hypertension Other    • Lung cancer Other    • Migraines Other    • Polycystic ovary syndrome Other    • Schizophrenia Other    • Stroke Other    • Tuberculosis Other    • Other Other      respiratory disorder; smoking tobacco   • Breast cancer Paternal Grandmother    • Hypertension Mother    • Cancer Father          Social History     Social History   • Marital status:      Spouse name: N/A   • Number of children: N/A   • Years of education: N/A     Occupational History   • Not on file.     Social History Main Topics   • Smoking status: Current Every Day Smoker     Packs/day: 1.00     Types: Cigarettes   • Smokeless tobacco: Never Used   • Alcohol use No   • Drug use: No   • Sexual activity: Defer     Other Topics Concern   • Not on file     Social History Narrative         Current Outpatient Prescriptions   Medication Sig Dispense Refill   • albuterol (VENTOLIN HFA) 108 (90 Base) MCG/ACT inhaler Inhale 2 puffs Every 6 (Six) Hours As Needed for Wheezing or Shortness of Air. 1 inhaler 3   • amLODIPine-benazepril (LOTREL 5-20) 5-20 MG per capsule Take 1 capsule by mouth Daily. 30 capsule 5   • atorvastatin (LIPITOR) 10 MG tablet Take 1 tablet by mouth Daily. 30 tablet 5   • fluticasone (FLONASE) 50 MCG/ACT nasal spray 2 sprays into each nostril Daily. 1 each 5   • meloxicam (MOBIC) 15 MG tablet Take 1 tablet by mouth Daily. 30 tablet 5   • montelukast (SINGULAIR) 10 MG tablet Take 1 tablet by mouth Every Night for 180 days. 30 tablet 5   • omeprazole (priLOSEC) 20 MG capsule Take 1 capsule by mouth 2 (Two) Times a Day. 60 capsule 5   • pregabalin (LYRICA) 50 MG capsule Take 1 capsule by mouth 3 (Three) Times a Day. 90 capsule 2   • tiZANidine (ZANAFLEX) 4 MG tablet Take 1 tablet by mouth Every 6 (Six) Hours As Needed for Muscle Spasms. 30 tablet 2     No current facility-administered medications for this visit.          OBJECTIVE    BP  "170/89  Pulse 77  Ht 60\" (152.4 cm)  Wt 158 lb (71.7 kg)  SpO2 95%  BMI 30.86 kg/m2      Review of Systems   Constitutional: Negative for chills and fever.   Cardiovascular: Negative for chest pain.   Gastrointestinal: Negative for constipation, diarrhea, nausea and vomiting.   Skin: Negative for wound.   Musculoskeletal:Right foot pain      Constitutional: well developed, well nourished    HEENT: Normocephalic and atraumatic, normal hearing    Respiratory: Non labored respirations noted    Right lower extremity exam    Cardiovascular:    DP/PT pulses palpable    CFT brisk  to all digits  Skin temp is warm to warm from proximal tibia to distal digits  Pedal hair growth present.   No erythema or edema noted     Musculoskeletal:  Muscle strength is 5/5 for all muscle groups tested   ROM of the 1st MTP is limited with pain and crepitus    ROM of the ankle joint is full without pain or crepitus    Pain on palpation to the right great toe joint    Rectus foot type     Dermatological:     Skin is warm, dry and intact     No subcutaneous nodules or masses noted    No open wounds noted     Neurological:   Sensation intact to light touch    DTR intact    Psychiatric: A&O x 3 with normal mood and affect. NAD.     Radiographs: 3 views the right foot were obtained today.  No acute osseous abnormalities noted.  Pertinent metatarsal phalangeal joint degeneration noted.        Procedures        ASSESSMENT AND PLAN    Isabel was seen today for toe pain.    Diagnoses and all orders for this visit:    Pain of toe of right foot  -     XR Foot 3+ View Right    Hallux limitus, right    - Comprehensive foot and ankle exam performed.   - X-rays taken and reviewed  - Lengthy discussion held patient regarding conservative versus surgical options to treat her right great toe pain.  We will start conservatively with orthotics with a rigid Lloyd's extension.  - Rx for custom orthotics  - All questions were answered and the patient is in " agreement with the current treatment plan.  - RTC after obtaining new orthotics            This document has been electronically signed by Jun Andrade DPM on September 12, 2017 7:55 AM     9/12/2017  7:55 AM    EMR Dragon/Transcription disclaimer:   Much of this encounter note is an electronic transcription/translation of spoken language to printed text. The electronic translation of spoken language may permit erroneous, or at times, nonsensical words or phrases to be inadvertently transcribed; Although I have reviewed the note for such errors, some may still exist.

## 2017-09-12 ENCOUNTER — TRANSCRIBE ORDERS (OUTPATIENT)
Dept: PODIATRY | Facility: CLINIC | Age: 52
End: 2017-09-12

## 2017-09-12 DIAGNOSIS — M20.5X1 HALLUX LIMITUS, RIGHT: Primary | ICD-10-CM

## 2017-09-14 ENCOUNTER — HOSPITAL ENCOUNTER (OUTPATIENT)
Dept: PHYSICAL THERAPY | Facility: HOSPITAL | Age: 52
Setting detail: THERAPIES SERIES
Discharge: HOME | End: 2017-09-14

## 2017-09-14 ENCOUNTER — APPOINTMENT (OUTPATIENT)
Dept: PHYSICAL THERAPY | Facility: HOSPITAL | Age: 52
End: 2017-09-14

## 2017-09-14 DIAGNOSIS — M20.5X1 HALLUX LIMITUS, RIGHT: Primary | ICD-10-CM

## 2017-09-14 PROCEDURE — G8979 MOBILITY GOAL STATUS: HCPCS | Performed by: PHYSICAL THERAPIST

## 2017-09-14 PROCEDURE — 97161 PT EVAL LOW COMPLEX 20 MIN: CPT | Performed by: PHYSICAL THERAPIST

## 2017-09-14 PROCEDURE — G8978 MOBILITY CURRENT STATUS: HCPCS | Performed by: PHYSICAL THERAPIST

## 2017-09-14 NOTE — THERAPY EVALUATION
Outpatient Physical Therapy Ortho Initial Evaluation  [facility]     Patient Name: Isabel Villalba  : 1965  MRN: [id number]  Today's Date: 2017      Visit Date: 2017  Attendance:   Subjective % Improvement: N/A  Recert Date: 10/5/2017  MD appointment: 2 weeks after orthotics     Therapy Diagnosis: Right foot/toe pain    Patient Active Problem List   Diagnosis   • Weight increase   • Vitamin B12 deficiency   • Systolic murmur   • Pruritic rash   • Protrusion of cervical intervertebral disc   • Lumbago-sciatica due to displacement of lumbar intervertebral disc   • Lumbago with sciatica   • Hyperlipidemia   • Irreducible incisional hernia   • Hernia of abdominal wall   • H/O screening mammography   • Gouty arthropathy   • Gastritis   • Gastroesophageal reflux disease   • Fatigue   • Essential hypertension   • Epigastric pain   • Disorder of bile duct   • Acute upper respiratory infection        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   • Epigastric pain    • Essential hypertension    • Fatigue    • Gastritis    • Gastroesophageal reflux disease    • Gouty arthropathy     left first MTP joint   • H/O screening mammography 2014   • Hernia of abdominal wall    • Hyperlipidemia    • Irreducible incisional hernia     vs lipoma   • Lumbago with sciatica    • Lumbago-sciatica due to displacement of lumbar intervertebral disc    • Protrusion of cervical intervertebral disc     Broad based intervertebral disc protrusion - C5-6, minimally impressing upon ventral thecal sac by MRI done 4/7/15    • Pruritic rash    • Systolic murmur    • Vitamin B12 deficiency    • Weight increase         Past Surgical History:   Procedure Laterality Date   • BREAST BIOPSY     • CARPAL TUNNEL RELEASE Bilateral    • CHOLECYSTECTOMY     • INJECTION OF MEDICATION  2015    Bicillin LA 1.2   •  "INJECTION OF MEDICATION  11/17/2015    depo medrol   • TUBAL ABDOMINAL LIGATION  2004       Visit Dx:     ICD-10-CM ICD-9-CM   1. Hallux limitus, right M20.5X1 735.8             Patient History       09/14/17 0925          History    Chief Complaint Pain  -BB      Type of Pain Foot pain  -BB      Date Current Problem(s) Began --   Chronic over 2 years  -BB      Brief Description of Current Complaint Insidious onset of foot and great toe pain that has progressed through time with no relief. Resting is what seems to improve pain most and tight socks. Weightbearing activities increases pain. Enjoys being barefoot but cant be barefoot anymore due to increased pain.   -BB      Onset Date- PT 09/14/2017  -BB      Patient/Caregiver Goals Relieve pain  -BB      Patient's Rating of General Health Good  -BB      Hand Dominance right-handed  -BB      Occupation/sports/leisure activities [employer] grandkids  -BB      What clinical tests have you had for this problem? X-ray  -BB      Results of Clinical Tests per patient \"bone on bone\"  -BB      Pain     Pain Location Foot  -BB      Pain at Present 7;8  -BB      Pain at Best 3;4  -BB      Pain at Worst --   \"over a ten\"  -BB      Pain Frequency Constant/continuous  -BB      Pain Description Burning;Sharp;Stabbing;Tingling  -BB      What Performance Factors Make the Current Problem(s) WORSE? weightbearing activities   -BB      What Performance Factors Make the Current Problem(s) BETTER? rest   -BB      Tolerance Time- Standing unlimited with pain   -BB      Tolerance Time- Walking unlimited with pain   -BB      Is your sleep disturbed? No  -BB      Is medication used to assist with sleep? Yes   trazadone, lyrica  -BB      Fall Risk Assessment    Any falls in the past year: Yes  -BB      Number of falls reported in the last 12 months 1  -BB      Factors that contributed to the fall: --   dog knocked her down   -BB        User Key  (r) = Recorded By, (t) = Taken By, (c) = Cosigned " By    Initials Name Provider Type    BB Shannan Guan PT Physical Therapist                PT Ortho       09/14/17 6174    Subjective Comments    Subjective Comments See patient history   -BB    Precautions and Contraindications    Precautions/Limitations no known precautions/limitations  -BB    Subjective Pain    Able to rate subjective pain? yes  -BB    Pre-Treatment Pain Level 8  -BB    Post-Treatment Pain Level 8  -BB    Posture/Observations    Posture/Observations Comments Redness right great toe with mild warmth. ttp global great toe on right. No acute distress. Slight antalgic gait favoring RLE.   -BB    Quarter Clearing    Quarter Clearing Lower Quarter Clearing  -BB    Sensory Screen for Light Touch- Lower Quarter Clearing    L3 (distal anterior thigh) Bilateral:;Intact  -BB    L4 (medial lower leg/foot) Bilateral:;Intact  -BB    L5 (lateral lower leg/great toe) Bilateral:;Intact  -BB    S1 (bottom of foot) Bilateral:;Intact  -BB    Special Tests/Palpation    Special Tests/Palpation Ankle/Foot  -BB    Foot/Ankle Palpation    Metatarsals Right:;Tender   Great toe distally  -BB    Toes Right:;Tender;Swollen;Warm   great toe  -BB    Foot/Ankle Palpation? Yes  -BB    Toes Accessory Motion    Toes Accessory Motions Tested? Yes  -BB    MTP Hallux - flexion Right:;Hypomobile  -BB    MTP Hallux - extension Right:;Hypomobile  -BB    MTP Hallux - abduction Right:;Hypomobile  -BB    Ankle/Foot Special Tests    Inversion Stress Test Bilateral:;Negative  -BB    Eversion Stress Test Bilateral:;Negative  -BB    ROM (Range of Motion)    General ROM Detail ROM all grossly WNL for gait. Hypomobility noted of right great toe  -BB    MMT (Manual Muscle Testing)    General MMT Assessment no strength deficits identified  -BB    Transfers    Transfer, Comment Independent  -BB    Gait Assessment/Treatment    Gait, Comment Independent  -BB      User Key  (r) = Recorded By, (t) = Taken By, (c) = Cosigned By    Initials Name  Provider Type    BB Shannan Guan, PT Physical Therapist                            Therapy Education       09/14/17 0925          Therapy Education    Education Details wear schedule, skin inspection   -BB      Given Symptoms/condition management  -BB      Program New  -BB      How Provided Verbal  -BB      Provided to Patient  -BB      Level of Understanding Verbalized  -BB        User Key  (r) = Recorded By, (t) = Taken By, (c) = Cosigned By    Initials Name Provider Type    BB Shannan Guan, PT Physical Therapist                PT OP Goals       09/14/17 0925       PT Short Term Goals    STG Date to Achieve 10/05/17  -BB     STG 1 Good fit/mold seen   -BB     STG 1 Progress New  -BB     STG 2 Independent in wear schedule  -BB     STG 2 Progress New  -BB     Time Calculation    PT Goal Re-Cert Due Date 10/05/17  -BB       User Key  (r) = Recorded By, (t) = Taken By, (c) = Cosigned By    Initials Name Provider Type    BB Shannan Guan, PT Physical Therapist                PT Assessment/Plan       09/14/17 0925       PT Assessment    Functional Limitations Impaired gait;Limitations in functional capacity and performance  -BB     Impairments Gait;Pain  -BB     Assessment Comments Patient presents with chronic pain of the right foot particularly of the great toe. Patient has decreased ROM, tenderness globally of great toe and warmth felt. Patient could benefit from custom orthotics to asisst with positioning of the foot and support. Orthotics to be fabricated per MD orders.   -BB     Please refer to paper survey for additional self-reported information No  -BB     Rehab Potential Good  -BB     Patient/caregiver participated in establishment of treatment plan and goals Yes  -BB     Patient would benefit from skilled therapy intervention Yes  -BB     PT Plan    PT Frequency 1x/week  -BB     Predicted Duration of Therapy Intervention (days/wks) 1 visit for pickout   -BB     Planned CPT's? PT EVAL LOW COMPLEXITY:  25966;PT RE-EVAL: 00070;PT ORTHOTIC MGMT/TRAIN EA 15 MIN: 83410;PT THER SUPP EA 15 MIN  -BB     Physical Therapy Interventions (Optional Details) home exercise program;orthotic fitting/training  -BB     PT Plan Comments orthotic pickup   -BB       User Key  (r) = Recorded By, (t) = Taken By, (c) = Cosigned By    Initials Name Provider Type    BB Shannan Guan, PT Physical Therapist                  Exercises       09/14/17 0925          Subjective Comments    Subjective Comments See patient history   -BB      Subjective Pain    Able to rate subjective pain? yes  -BB      Pre-Treatment Pain Level 8  -BB      Post-Treatment Pain Level 8  -BB        User Key  (r) = Recorded By, (t) = Taken By, (c) = Cosigned By    Initials Name Provider Type    FAROOQ Guan, PT Physical Therapist           Manual Rx (last 36 hours)      Manual Treatments       09/14/17 0925          Manual Rx 1    Manual Rx 1 Location Bilateral feet   -BB      Manual Rx 1 Type Plaster mold orthotic  -BB        User Key  (r) = Recorded By, (t) = Taken By, (c) = Cosigned By    Initials Name Provider Type    BB Shannan Guan, PT Physical Therapist                            Time Calculation:   Start Time: 0925  Stop Time: 1001  Time Calculation (min): 36 min  Total Timed Code Minutes- PT: 0 minute(s)     Therapy Charges for Today     Code Description Service Date Service Provider Modifiers Qty    02259600835 HC PT MOBILITY CURRENT 9/14/2017 Shannan Guan, PT GP, CI 1    92204006716 HC PT MOBILITY PROJECTED 9/14/2017 Shannan Guan, PT GP, CI 1    45420592526 HC PT EVAL LOW COMPLEXITY 1 9/14/2017 Shannan Guan, PT GP 1    50199280368 HC PT-CUSTOM ORTHOTICS-LEVEL 2 9/14/2017 Shannan Guan PT  1          PT G-Codes  PT Professional Judgement Used?: Yes  Functional Limitation: Mobility: Walking and moving around  Mobility: Walking and Moving Around Current Status (): At least 1 percent but less than 20 percent impaired, limited or  restricted  Mobility: Walking and Moving Around Goal Status (): At least 1 percent but less than 20 percent impaired, limited or restricted         Shannan Guan, PT  9/14/2017

## 2017-10-16 RX ORDER — BENZONATATE 200 MG/1
CAPSULE ORAL
Qty: 15 CAPSULE | Refills: 2 | Status: SHIPPED | OUTPATIENT
Start: 2017-10-16 | End: 2018-06-04 | Stop reason: SDUPTHER

## 2017-10-16 RX ORDER — ATORVASTATIN CALCIUM 10 MG/1
10 TABLET, FILM COATED ORAL DAILY
Qty: 30 TABLET | Refills: 5 | Status: SHIPPED | OUTPATIENT
Start: 2017-10-16 | End: 2018-09-06 | Stop reason: SDUPTHER

## 2017-10-17 ENCOUNTER — OFFICE VISIT (OUTPATIENT)
Dept: PODIATRY | Facility: CLINIC | Age: 52
End: 2017-10-17

## 2017-10-17 VITALS
OXYGEN SATURATION: 96 % | HEIGHT: 60 IN | DIASTOLIC BLOOD PRESSURE: 96 MMHG | HEART RATE: 78 BPM | BODY MASS INDEX: 30.98 KG/M2 | WEIGHT: 157.8 LBS | SYSTOLIC BLOOD PRESSURE: 153 MMHG

## 2017-10-17 DIAGNOSIS — M20.5X1 HALLUX LIMITUS, RIGHT: Primary | ICD-10-CM

## 2017-10-17 PROCEDURE — 99213 OFFICE O/P EST LOW 20 MIN: CPT | Performed by: PODIATRIST

## 2017-10-17 NOTE — PROGRESS NOTES
Isabel Villalba  1965  52 y.o. female     Patient presents today for recheck of her right hallux pain.     10/17/17      Chief Complaint   Patient presents with   • Right Foot - Follow-up           History of Present Illness    Patient returns to clinic today for follow-up for right great toe pain.  She has obtained her custom orthotics.  She states that when she wears her shoes with a custom orthotics in them the pain in her foot is much better.  She continues to have pain in the great toe joint when she goes barefoot.  She rates that pain as a 6 out of 10.  She states that the new orthotics and actually improved her back pain.  She has no new pedal complaints today.      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   • Epigastric pain    • Essential hypertension    • Fatigue    • Gastritis    • Gastroesophageal reflux disease    • Gouty arthropathy     left first MTP joint   • H/O screening mammography 06/24/2014   • Hallux limitus of right foot    • Hernia of abdominal wall    • Hyperlipidemia    • Irreducible incisional hernia     vs lipoma   • Lumbago with sciatica    • Lumbago-sciatica due to displacement of lumbar intervertebral disc    • Protrusion of cervical intervertebral disc     Broad based intervertebral disc protrusion - C5-6, minimally impressing upon ventral thecal sac by MRI done 4/7/15    • Pruritic rash    • Systolic murmur    • Vitamin B12 deficiency    • Weight increase          Past Surgical History:   Procedure Laterality Date   • BREAST BIOPSY     • CARPAL TUNNEL RELEASE Bilateral 1995   • CHOLECYSTECTOMY  2004   • INJECTION OF MEDICATION  11/17/2015    Bicillin LA 1.2   • INJECTION OF MEDICATION  11/17/2015    depo medrol   • TUBAL ABDOMINAL LIGATION  2004         Family History   Problem Relation Age of Onset   • ADD / ADHD Other    • Bipolar disorder Other    • Breast cancer Other     • Cancer Other    • Coronary artery disease Other    • Diabetes Other    • Heart disease Other    • Hyperlipidemia Other    • Hypertension Other    • Lung cancer Other    • Migraines Other    • Polycystic ovary syndrome Other    • Schizophrenia Other    • Stroke Other    • Tuberculosis Other    • Other Other      respiratory disorder; smoking tobacco   • Breast cancer Paternal Grandmother    • Hypertension Mother    • Cancer Father          Social History     Social History   • Marital status:      Spouse name: N/A   • Number of children: N/A   • Years of education: N/A     Occupational History   • Not on file.     Social History Main Topics   • Smoking status: Current Every Day Smoker     Packs/day: 1.00     Types: Cigarettes   • Smokeless tobacco: Never Used   • Alcohol use No   • Drug use: No   • Sexual activity: Defer     Other Topics Concern   • Not on file     Social History Narrative         Current Outpatient Prescriptions   Medication Sig Dispense Refill   • albuterol (VENTOLIN HFA) 108 (90 Base) MCG/ACT inhaler Inhale 2 puffs Every 6 (Six) Hours As Needed for Wheezing or Shortness of Air. 1 inhaler 3   • amLODIPine-benazepril (LOTREL 5-20) 5-20 MG per capsule Take 1 capsule by mouth Daily. 30 capsule 5   • atorvastatin (LIPITOR) 10 MG tablet Take 1 tablet by mouth Daily. 30 tablet 5   • benzonatate (TESSALON) 200 MG capsule TAKE 1 CAPSULE BY MOUTH 3 (THREE) TIMES A DAY AS NEEDED FOR COUGH FOR UP TO 10 DAYS. 15 capsule 2   • fluticasone (FLONASE) 50 MCG/ACT nasal spray 2 sprays into each nostril Daily. 1 each 5   • meloxicam (MOBIC) 15 MG tablet Take 1 tablet by mouth Daily. 30 tablet 5   • montelukast (SINGULAIR) 10 MG tablet Take 1 tablet by mouth Every Night for 180 days. 30 tablet 5   • omeprazole (priLOSEC) 20 MG capsule Take 1 capsule by mouth 2 (Two) Times a Day. 60 capsule 5   • pregabalin (LYRICA) 50 MG capsule Take 1 capsule by mouth 3 (Three) Times a Day. 90 capsule 2   • tiZANidine  "(ZANAFLEX) 4 MG tablet Take 1 tablet by mouth Every 6 (Six) Hours As Needed for Muscle Spasms. 30 tablet 2     No current facility-administered medications for this visit.          OBJECTIVE    /96  Pulse 78  Ht 60\" (152.4 cm)  Wt 157 lb 12.8 oz (71.6 kg)  SpO2 96%  BMI 30.82 kg/m2      Review of Systems   Constitutional: Negative for chills and fever.   Cardiovascular: Negative for chest pain.   Gastrointestinal: Negative for constipation, diarrhea, nausea and vomiting.   Skin: Negative for wound.   Musculoskeletal:Right foot pain      Constitutional: well developed, well nourished    HEENT: Normocephalic and atraumatic, normal hearing    Respiratory: Non labored respirations noted    Right lower extremity exam    Cardiovascular:    DP/PT pulses palpable    CFT brisk  to all digits     Musculoskeletal:  Muscle strength is 5/5 for all muscle groups tested   ROM of the 1st MTP is limited with pain and crepitus    ROM of the ankle joint is full without pain or crepitus    Pain on palpation to the right great toe joint      Dermatological:     Skin is warm, dry and intact     No subcutaneous nodules or masses noted      Neurological:   Sensation intact to light touch    DTR intact    Psychiatric: A&O x 3 with normal mood and affect. NAD.         Procedures        ASSESSMENT AND PLAN    Isabel was seen today for follow-up.    Diagnoses and all orders for this visit:    Hallux limitus, right    - Brief discussion was held regarding surgical intervention for her right great toe pain.  Plan will be for arthrodesis.  - Continue custom orthotics.  Advised patient to avoid going barefoot  - All questions were answered   - RTC if symptoms worsen or fail to improve            This document has been electronically signed by Jun Andrade DPM on October 17, 2017 5:49 PM     10/17/2017  5:49 PM    "

## 2018-03-07 RX ORDER — TIZANIDINE 4 MG/1
4 TABLET ORAL EVERY 6 HOURS PRN
Qty: 90 TABLET | Refills: 0 | Status: SHIPPED | OUTPATIENT
Start: 2018-03-07 | End: 2018-04-10 | Stop reason: SDUPTHER

## 2018-03-19 RX ORDER — AMLODIPINE BESYLATE AND BENAZEPRIL HYDROCHLORIDE 5; 20 MG/1; MG/1
1 CAPSULE ORAL DAILY
Qty: 30 CAPSULE | Refills: 0 | Status: SHIPPED | OUTPATIENT
Start: 2018-03-19 | End: 2018-04-10 | Stop reason: SDUPTHER

## 2018-04-10 ENCOUNTER — OFFICE VISIT (OUTPATIENT)
Dept: FAMILY MEDICINE CLINIC | Facility: CLINIC | Age: 53
End: 2018-04-10

## 2018-04-10 VITALS
HEIGHT: 60 IN | BODY MASS INDEX: 30.82 KG/M2 | WEIGHT: 157 LBS | SYSTOLIC BLOOD PRESSURE: 120 MMHG | DIASTOLIC BLOOD PRESSURE: 74 MMHG

## 2018-04-10 DIAGNOSIS — E55.9 VITAMIN D DEFICIENCY: ICD-10-CM

## 2018-04-10 DIAGNOSIS — M51.27 LUMBAGO-SCIATICA DUE TO DISPLACEMENT OF LUMBAR INTERVERTEBRAL DISC: Primary | ICD-10-CM

## 2018-04-10 DIAGNOSIS — N95.1 SYMPTOMATIC MENOPAUSAL OR FEMALE CLIMACTERIC STATES: ICD-10-CM

## 2018-04-10 DIAGNOSIS — E53.8 VITAMIN B12 DEFICIENCY: ICD-10-CM

## 2018-04-10 DIAGNOSIS — Z12.31 ENCOUNTER FOR SCREENING MAMMOGRAM FOR MALIGNANT NEOPLASM OF BREAST: ICD-10-CM

## 2018-04-10 DIAGNOSIS — I10 ESSENTIAL HYPERTENSION: ICD-10-CM

## 2018-04-10 PROCEDURE — 99214 OFFICE O/P EST MOD 30 MIN: CPT | Performed by: FAMILY MEDICINE

## 2018-04-10 RX ORDER — PREGABALIN 50 MG/1
50 CAPSULE ORAL 3 TIMES DAILY
Qty: 90 CAPSULE | Refills: 2 | Status: CANCELLED | OUTPATIENT
Start: 2018-04-10

## 2018-04-10 RX ORDER — PREGABALIN 100 MG/1
100 CAPSULE ORAL 3 TIMES DAILY
Qty: 90 CAPSULE | Refills: 2 | Status: SHIPPED | OUTPATIENT
Start: 2018-04-10 | End: 2018-08-06 | Stop reason: SDUPTHER

## 2018-04-10 RX ORDER — AMLODIPINE BESYLATE AND BENAZEPRIL HYDROCHLORIDE 5; 20 MG/1; MG/1
1 CAPSULE ORAL DAILY
Qty: 30 CAPSULE | Refills: 5 | Status: SHIPPED | OUTPATIENT
Start: 2018-04-10 | End: 2018-09-06 | Stop reason: SDUPTHER

## 2018-04-10 RX ORDER — TIZANIDINE 4 MG/1
4 TABLET ORAL EVERY 8 HOURS PRN
Qty: 90 TABLET | Refills: 5 | Status: SHIPPED | OUTPATIENT
Start: 2018-04-10 | End: 2018-09-06 | Stop reason: SDUPTHER

## 2018-05-02 ENCOUNTER — LAB (OUTPATIENT)
Dept: LAB | Facility: OTHER | Age: 53
End: 2018-05-02

## 2018-05-02 DIAGNOSIS — I10 ESSENTIAL HYPERTENSION: ICD-10-CM

## 2018-05-02 DIAGNOSIS — E55.9 VITAMIN D DEFICIENCY: ICD-10-CM

## 2018-05-02 LAB
25(OH)D3 SERPL-MCNC: 20.8 NG/ML (ref 30–100)
ALBUMIN SERPL-MCNC: 4.1 G/DL (ref 3.2–5.5)
ALBUMIN/GLOB SERPL: 1.1 G/DL (ref 1–3)
ALP SERPL-CCNC: 76 U/L (ref 15–121)
ALT SERPL W P-5'-P-CCNC: 25 U/L (ref 10–60)
ANION GAP SERPL CALCULATED.3IONS-SCNC: 7 MMOL/L (ref 5–15)
AST SERPL-CCNC: 23 U/L (ref 10–60)
BASOPHILS # BLD AUTO: 0.03 10*3/MM3 (ref 0–0.2)
BASOPHILS NFR BLD AUTO: 0.3 % (ref 0–2)
BILIRUB SERPL-MCNC: 0.5 MG/DL (ref 0.2–1)
BUN BLD-MCNC: 8 MG/DL (ref 8–25)
BUN/CREAT SERPL: 13.3 (ref 7–25)
CALCIUM SPEC-SCNC: 9.5 MG/DL (ref 8.4–10.8)
CHLORIDE SERPL-SCNC: 104 MMOL/L (ref 100–112)
CHOLEST SERPL-MCNC: 267 MG/DL (ref 150–200)
CO2 SERPL-SCNC: 30 MMOL/L (ref 20–32)
CREAT BLD-MCNC: 0.6 MG/DL (ref 0.4–1.3)
DEPRECATED RDW RBC AUTO: 41.5 FL (ref 36.4–46.3)
EOSINOPHIL # BLD AUTO: 0.03 10*3/MM3 (ref 0–0.7)
EOSINOPHIL NFR BLD AUTO: 0.3 % (ref 0–7)
ERYTHROCYTE [DISTWIDTH] IN BLOOD BY AUTOMATED COUNT: 13.1 % (ref 11.5–14.5)
FOLATE SERPL-MCNC: 8.37 NG/ML (ref 2.76–21)
GFR SERPL CREATININE-BSD FRML MDRD: 105 ML/MIN/1.73 (ref 51–120)
GLOBULIN UR ELPH-MCNC: 3.9 GM/DL (ref 2.5–4.6)
GLUCOSE BLD-MCNC: 124 MG/DL (ref 70–100)
HCT VFR BLD AUTO: 43.4 % (ref 35–45)
HDLC SERPL-MCNC: 37 MG/DL (ref 35–100)
HGB BLD-MCNC: 14.1 G/DL (ref 12–15.5)
LDLC SERPL CALC-MCNC: 178 MG/DL
LDLC/HDLC SERPL: 4.82 {RATIO}
LYMPHOCYTES # BLD AUTO: 3.26 10*3/MM3 (ref 0.6–4.2)
LYMPHOCYTES NFR BLD AUTO: 36.6 % (ref 10–50)
MCH RBC QN AUTO: 28.6 PG (ref 26.5–34)
MCHC RBC AUTO-ENTMCNC: 32.5 G/DL (ref 31.4–36)
MCV RBC AUTO: 88 FL (ref 80–98)
MONOCYTES # BLD AUTO: 0.81 10*3/MM3 (ref 0–0.9)
MONOCYTES NFR BLD AUTO: 9.1 % (ref 0–12)
NEUTROPHILS # BLD AUTO: 4.77 10*3/MM3 (ref 2–8.6)
NEUTROPHILS NFR BLD AUTO: 53.7 % (ref 37–80)
PLATELET # BLD AUTO: 355 10*3/MM3 (ref 150–450)
PMV BLD AUTO: 9.4 FL (ref 8–12)
POTASSIUM BLD-SCNC: 4.2 MMOL/L (ref 3.4–5.4)
PROT SERPL-MCNC: 8 G/DL (ref 6.7–8.2)
RBC # BLD AUTO: 4.93 10*6/MM3 (ref 3.77–5.16)
SODIUM BLD-SCNC: 141 MMOL/L (ref 134–146)
T4 FREE SERPL-MCNC: 0.98 NG/DL (ref 0.78–2.19)
TRIGL SERPL-MCNC: 258 MG/DL (ref 35–160)
TSH SERPL DL<=0.05 MIU/L-ACNC: 0.99 MIU/ML (ref 0.46–4.68)
VIT B12 BLD-MCNC: 383 PG/ML (ref 239–931)
VLDLC SERPL-MCNC: 51.6 MG/DL
WBC NRBC COR # BLD: 8.9 10*3/MM3 (ref 3.2–9.8)

## 2018-05-02 PROCEDURE — 82306 VITAMIN D 25 HYDROXY: CPT | Performed by: FAMILY MEDICINE

## 2018-05-02 PROCEDURE — 84439 ASSAY OF FREE THYROXINE: CPT | Performed by: FAMILY MEDICINE

## 2018-05-02 PROCEDURE — 84443 ASSAY THYROID STIM HORMONE: CPT | Performed by: FAMILY MEDICINE

## 2018-05-02 PROCEDURE — 85025 COMPLETE CBC W/AUTO DIFF WBC: CPT | Performed by: FAMILY MEDICINE

## 2018-05-02 PROCEDURE — 80061 LIPID PANEL: CPT | Performed by: FAMILY MEDICINE

## 2018-05-02 PROCEDURE — 82746 ASSAY OF FOLIC ACID SERUM: CPT | Performed by: FAMILY MEDICINE

## 2018-05-02 PROCEDURE — 80053 COMPREHEN METABOLIC PANEL: CPT | Performed by: FAMILY MEDICINE

## 2018-05-02 PROCEDURE — 82607 VITAMIN B-12: CPT | Performed by: FAMILY MEDICINE

## 2018-05-02 PROCEDURE — 36415 COLL VENOUS BLD VENIPUNCTURE: CPT | Performed by: FAMILY MEDICINE

## 2018-06-04 ENCOUNTER — OFFICE VISIT (OUTPATIENT)
Dept: FAMILY MEDICINE CLINIC | Facility: CLINIC | Age: 53
End: 2018-06-04

## 2018-06-04 VITALS
WEIGHT: 151 LBS | SYSTOLIC BLOOD PRESSURE: 130 MMHG | DIASTOLIC BLOOD PRESSURE: 76 MMHG | HEIGHT: 60 IN | BODY MASS INDEX: 29.64 KG/M2

## 2018-06-04 DIAGNOSIS — E78.5 HYPERLIPIDEMIA, UNSPECIFIED HYPERLIPIDEMIA TYPE: Primary | ICD-10-CM

## 2018-06-04 DIAGNOSIS — E55.9 VITAMIN D DEFICIENCY: ICD-10-CM

## 2018-06-04 DIAGNOSIS — M81.0 OSTEOPOROSIS WITHOUT CURRENT PATHOLOGICAL FRACTURE, UNSPECIFIED OSTEOPOROSIS TYPE: ICD-10-CM

## 2018-06-04 PROCEDURE — 99214 OFFICE O/P EST MOD 30 MIN: CPT | Performed by: FAMILY MEDICINE

## 2018-06-04 RX ORDER — ERGOCALCIFEROL 1.25 MG/1
50000 CAPSULE ORAL 2 TIMES WEEKLY
Qty: 8 CAPSULE | Refills: 5 | Status: SHIPPED | OUTPATIENT
Start: 2018-06-04 | End: 2018-09-06 | Stop reason: SDUPTHER

## 2018-06-04 RX ORDER — BENZONATATE 200 MG/1
200 CAPSULE ORAL 3 TIMES DAILY PRN
Qty: 15 CAPSULE | Refills: 2 | Status: SHIPPED | OUTPATIENT
Start: 2018-06-04 | End: 2018-09-06 | Stop reason: SDUPTHER

## 2018-06-04 NOTE — PROGRESS NOTES
Subjective   Isabel Villalba is a 52 y.o. female who presents to the office for follow-up on recent labs and bone density.  She really got a card that her mammogram was normal.  She has a vitamin D deficiency which is new.  Her bone density shows osteoporosis, which is also a new finding.  She also has hyperlipidemia which has improved but is still very high with the Lipitor.  She has muscle aches in the legs and all over.    History of Present Illness     Hyperlipidemia   This is a chronic problem. The current episode started more than 1 year ago. The problem is uncontrolled. Recent lipid tests were reviewed and are high. Exacerbating diseases include obesity. He has no history of chronic renal disease, diabetes, hypothyroidism, liver disease or nephrotic syndrome. There are no known factors aggravating his hyperlipidemia. Pertinent negatives include no chest pain, focal sensory loss, focal weakness, leg pain, myalgias or shortness of breath. Current antihyperlipidemic treatment includes statins. The current treatment provides moderate improvement of lipids. Compliance problems include adherence to diet.  Risk factors for coronary artery disease include family history and dyslipidemia.     Review of Systems   Constitutional: Positive for fatigue.   HENT: Negative for congestion, postnasal drip, rhinorrhea, sinus pressure and sore throat.    Respiratory: Negative for choking and shortness of breath.    Cardiovascular: Negative.  Negative for chest pain and palpitations.   Gastrointestinal: Negative.    Musculoskeletal: Positive for arthralgias, back pain and gait problem. Negative for myalgias.   Skin: Negative.    Allergic/Immunologic: Negative for immunocompromised state.   Neurological: Negative for dizziness, tremors, seizures, syncope, weakness and numbness.   Hematological: Negative.    Psychiatric/Behavioral: Negative for agitation, confusion, decreased concentration, dysphoric mood, sleep disturbance  and suicidal ideas. The patient is not nervous/anxious.    All other systems reviewed and are negative.      Objective   Physical Exam   Constitutional: She is oriented to person, place, and time. She appears well-developed and well-nourished.   HENT:   Head: Normocephalic and atraumatic.       Eyes: Conjunctivae and EOM are normal. Pupils are equal, round, and reactive to light.   Neck: Normal range of motion. Neck supple. No JVD present. No tracheal deviation present. No thyromegaly present.   Cardiovascular: Normal rate, regular rhythm, normal heart sounds and intact distal pulses.    No murmur heard.  Pulmonary/Chest: Effort normal and breath sounds normal. She has no wheezes.   Abdominal: Soft. Bowel sounds are normal. She exhibits no distension. There is no tenderness.   Musculoskeletal: Normal range of motion. She exhibits no edema.   Lymphadenopathy:     She has no cervical adenopathy.   Neurological: She is alert and oriented to person, place, and time. Coordination normal.   Skin: Skin is warm and dry. No rash noted.   Psychiatric: She has a normal mood and affect.   Nursing note and vitals reviewed.    No visits with results within 1 Month(s) from this visit.   Latest known visit with results is:   Lab on 05/02/2018   Component Date Value Ref Range Status   • Total Cholesterol 05/02/2018 267* 150 - 200 mg/dL Final   • Triglycerides 05/02/2018 258* 35 - 160 mg/dL Final   • HDL Cholesterol 05/02/2018 37  35 - 100 mg/dL Final   • LDL Cholesterol  05/02/2018 178  mg/dL Final   • VLDL Cholesterol 05/02/2018 51.6  mg/dL Final   • LDL/HDL Ratio 05/02/2018 4.82   Final   • 25 Hydroxy, Vitamin D 05/02/2018 20.8* 30.0 - 100.0 ng/ml Final   • WBC 05/02/2018 8.90  3.20 - 9.80 10*3/mm3 Final   • RBC 05/02/2018 4.93  3.77 - 5.16 10*6/mm3 Final   • Hemoglobin 05/02/2018 14.1  12.0 - 15.5 g/dL Final   • Hematocrit 05/02/2018 43.4  35.0 - 45.0 % Final   • MCV 05/02/2018 88.0  80.0 - 98.0 fL Final   • MCH 05/02/2018 28.6   26.5 - 34.0 pg Final   • MCHC 05/02/2018 32.5  31.4 - 36.0 g/dL Final   • RDW 05/02/2018 13.1  11.5 - 14.5 % Final   • RDW-SD 05/02/2018 41.5  36.4 - 46.3 fl Final   • MPV 05/02/2018 9.4  8.0 - 12.0 fL Final   • Platelets 05/02/2018 355  150 - 450 10*3/mm3 Final   • Neutrophil % 05/02/2018 53.7  37.0 - 80.0 % Final   • Lymphocyte % 05/02/2018 36.6  10.0 - 50.0 % Final   • Monocyte % 05/02/2018 9.1  0.0 - 12.0 % Final   • Eosinophil % 05/02/2018 0.3  0.0 - 7.0 % Final   • Basophil % 05/02/2018 0.3  0.0 - 2.0 % Final   • Neutrophils, Absolute 05/02/2018 4.77  2.00 - 8.60 10*3/mm3 Final   • Lymphocytes, Absolute 05/02/2018 3.26  0.60 - 4.20 10*3/mm3 Final   • Monocytes, Absolute 05/02/2018 0.81  0.00 - 0.90 10*3/mm3 Final   • Eosinophils, Absolute 05/02/2018 0.03  0.00 - 0.70 10*3/mm3 Final   • Basophils, Absolute 05/02/2018 0.03  0.00 - 0.20 10*3/mm3 Final   ]  Assessment/Plan   Isabel was seen today for results.    Diagnoses and all orders for this visit:    Hyperlipidemia, unspecified hyperlipidemia type    Vitamin D deficiency    Osteoporosis without current pathological fracture, unspecified osteoporosis type    Other orders  -     benzonatate (TESSALON) 200 MG capsule; Take 1 capsule by mouth 3 (Three) Times a Day As Needed for Cough.  -     vitamin D (ERGOCALCIFEROL) 84222 units capsule capsule; Take 1 capsule by mouth 2 (Two) Times a Week.  -     denosumab (PROLIA) 60 MG/ML solution syringe; Inject 1 mL under the skin 1 (One) Time for 1 dose.     Reviewed labs with her as above.  We'll start vitamin D replacement recommend calcium replacement also.  Given that she really has osteoporosis.  Probably a injections every 6 months.    Continue with Lipitor for hyperlipidemia.  She does have some improvement but she still has markedly elevated cholesterol.  Given that she's having some muscle aches though will await on increasing this.  Hopefully the vitamin D will help with the muscle aching and if so we will  increase the Lipitor.        This document has been electronically signed by Radha Simental MD on June 4, 2018 9:57 AM

## 2018-06-07 DIAGNOSIS — M81.0 OSTEOPOROSIS, UNSPECIFIED OSTEOPOROSIS TYPE, UNSPECIFIED PATHOLOGICAL FRACTURE PRESENCE: Primary | ICD-10-CM

## 2018-06-07 PROCEDURE — 96372 THER/PROPH/DIAG INJ SC/IM: CPT | Performed by: FAMILY MEDICINE

## 2018-08-06 RX ORDER — PREGABALIN 100 MG/1
100 CAPSULE ORAL 3 TIMES DAILY
Qty: 90 CAPSULE | Refills: 0 | Status: SHIPPED | OUTPATIENT
Start: 2018-08-06 | End: 2018-09-06 | Stop reason: SDUPTHER

## 2018-08-06 RX ORDER — PREGABALIN 100 MG/1
100 CAPSULE ORAL 3 TIMES DAILY
Qty: 90 CAPSULE | Refills: 0 | Status: CANCELLED | OUTPATIENT
Start: 2018-08-06

## 2018-09-06 ENCOUNTER — OFFICE VISIT (OUTPATIENT)
Dept: FAMILY MEDICINE CLINIC | Facility: CLINIC | Age: 53
End: 2018-09-06

## 2018-09-06 VITALS
WEIGHT: 156 LBS | HEIGHT: 60 IN | BODY MASS INDEX: 30.63 KG/M2 | SYSTOLIC BLOOD PRESSURE: 136 MMHG | DIASTOLIC BLOOD PRESSURE: 76 MMHG

## 2018-09-06 DIAGNOSIS — M54.41 CHRONIC BILATERAL LOW BACK PAIN WITH BILATERAL SCIATICA: Primary | ICD-10-CM

## 2018-09-06 DIAGNOSIS — E78.5 HYPERLIPIDEMIA, UNSPECIFIED HYPERLIPIDEMIA TYPE: ICD-10-CM

## 2018-09-06 DIAGNOSIS — G89.29 CHRONIC BILATERAL LOW BACK PAIN WITH BILATERAL SCIATICA: Primary | ICD-10-CM

## 2018-09-06 DIAGNOSIS — M54.42 CHRONIC BILATERAL LOW BACK PAIN WITH BILATERAL SCIATICA: Primary | ICD-10-CM

## 2018-09-06 DIAGNOSIS — F98.8 ADULT ATTENTION DEFICIT DISORDER: ICD-10-CM

## 2018-09-06 DIAGNOSIS — I10 ESSENTIAL HYPERTENSION: ICD-10-CM

## 2018-09-06 PROCEDURE — 99214 OFFICE O/P EST MOD 30 MIN: CPT | Performed by: FAMILY MEDICINE

## 2018-09-06 RX ORDER — OMEPRAZOLE 20 MG/1
20 CAPSULE, DELAYED RELEASE ORAL DAILY
Qty: 60 CAPSULE | Refills: 5 | Status: SHIPPED | OUTPATIENT
Start: 2018-09-06 | End: 2019-02-19 | Stop reason: SDUPTHER

## 2018-09-06 RX ORDER — ALBUTEROL SULFATE 90 UG/1
2 AEROSOL, METERED RESPIRATORY (INHALATION) EVERY 6 HOURS PRN
Qty: 1 INHALER | Refills: 3 | Status: SHIPPED | OUTPATIENT
Start: 2018-09-06 | End: 2019-02-19 | Stop reason: SDUPTHER

## 2018-09-06 RX ORDER — BENZONATATE 200 MG/1
200 CAPSULE ORAL 3 TIMES DAILY PRN
Qty: 15 CAPSULE | Refills: 2 | Status: SHIPPED | OUTPATIENT
Start: 2018-09-06 | End: 2019-09-24 | Stop reason: SDUPTHER

## 2018-09-06 RX ORDER — TIZANIDINE 4 MG/1
4 TABLET ORAL EVERY 8 HOURS PRN
Qty: 90 TABLET | Refills: 5 | Status: SHIPPED | OUTPATIENT
Start: 2018-09-06 | End: 2019-02-19 | Stop reason: SDUPTHER

## 2018-09-06 RX ORDER — PREGABALIN 100 MG/1
100 CAPSULE ORAL 3 TIMES DAILY
Qty: 90 CAPSULE | Refills: 2 | Status: SHIPPED | OUTPATIENT
Start: 2018-09-06 | End: 2019-02-19 | Stop reason: SDUPTHER

## 2018-09-06 RX ORDER — ATORVASTATIN CALCIUM 10 MG/1
10 TABLET, FILM COATED ORAL DAILY
Qty: 30 TABLET | Refills: 5 | Status: SHIPPED | OUTPATIENT
Start: 2018-09-06 | End: 2019-09-24 | Stop reason: SDUPTHER

## 2018-09-06 RX ORDER — AMLODIPINE BESYLATE AND BENAZEPRIL HYDROCHLORIDE 5; 20 MG/1; MG/1
1 CAPSULE ORAL DAILY
Qty: 30 CAPSULE | Refills: 5 | Status: SHIPPED | OUTPATIENT
Start: 2018-09-06 | End: 2019-02-19 | Stop reason: SDUPTHER

## 2018-09-06 RX ORDER — ERGOCALCIFEROL 1.25 MG/1
50000 CAPSULE ORAL 2 TIMES WEEKLY
Qty: 8 CAPSULE | Refills: 5 | Status: SHIPPED | OUTPATIENT
Start: 2018-09-06 | End: 2019-02-19 | Stop reason: SDUPTHER

## 2018-09-06 RX ORDER — ATOMOXETINE 25 MG/1
25 CAPSULE ORAL DAILY
Qty: 60 CAPSULE | Refills: 5 | Status: SHIPPED | OUTPATIENT
Start: 2018-09-06 | End: 2019-09-24 | Stop reason: SDUPTHER

## 2018-09-06 NOTE — PROGRESS NOTES
Subjective   Isabel Villalba is a 53 y.o. female who presents to the office for follow-up on low back pain radiating into the legs, hypertension, hyperlipidemia.  She has symptoms of adult ADD that bother her and keep her from being able to get her tasks completed.  She was treated for this as a child.    History of Present Illness   Hypertension   This is a chronic problem. The current episode started more than 1 year ago. The problem has been waxing and waning since onset. Associated symptoms include anxiety, headaches and malaise/fatigue. Pertinent negatives include no blurred vision, chest pain, neck pain, orthopnea, palpitations, peripheral edema, PND, shortness of breath or sweats. There are no associated agents to hypertension. Risk factors for coronary artery disease include dyslipidemia and family history. Past treatments include   The current treatment provides moderate improvement. There are no compliance problems.      Review of Systems   Constitutional: Positive for fatigue.   HENT: Negative for congestion, postnasal drip, rhinorrhea, sinus pressure and sore throat.    Respiratory: Negative for choking and shortness of breath.    Cardiovascular: Negative.  Negative for chest pain and palpitations.   Gastrointestinal: Negative.    Musculoskeletal: Positive for arthralgias, back pain and gait problem. Negative for myalgias.   Skin: Negative.    Allergic/Immunologic: Negative for immunocompromised state.   Neurological: Negative for dizziness, tremors, seizures, syncope, weakness and numbness.   Hematological: Negative.    Psychiatric/Behavioral: Negative for agitation, confusion, decreased concentration, dysphoric mood, sleep disturbance and suicidal ideas. The patient is not nervous/anxious.    All other systems reviewed and are negative.      Objective   Physical Exam   Constitutional: She is oriented to person, place, and time. She appears well-developed and well-nourished.   HENT:   Head:  Normocephalic and atraumatic.       Eyes: Pupils are equal, round, and reactive to light. Conjunctivae and EOM are normal.   Neck: Normal range of motion. Neck supple. No JVD present. No tracheal deviation present. No thyromegaly present.   Cardiovascular: Normal rate, regular rhythm, normal heart sounds and intact distal pulses.    No murmur heard.  Pulmonary/Chest: Effort normal and breath sounds normal. She has no wheezes.   Abdominal: Soft. Bowel sounds are normal. She exhibits no distension. There is no tenderness.   Musculoskeletal: Normal range of motion. She exhibits no edema.   Lymphadenopathy:     She has no cervical adenopathy.   Neurological: She is alert and oriented to person, place, and time. Coordination normal.   Skin: Skin is warm and dry. No rash noted.   Psychiatric: She has a normal mood and affect.   Nursing note and vitals reviewed.    Assessment/Plan   Isabel was seen today for follow-up and med refill.    Diagnoses and all orders for this visit:    Chronic bilateral low back pain with bilateral sciatica    Essential hypertension    Hyperlipidemia, unspecified hyperlipidemia type    Adult attention deficit disorder    Other orders  -     pregabalin (LYRICA) 100 MG capsule; Take 1 capsule by mouth 3 (Three) Times a Day.  -     tiZANidine (ZANAFLEX) 4 MG tablet; Take 1 tablet by mouth Every 8 (Eight) Hours As Needed for Muscle Spasms.  -     amLODIPine-benazepril (LOTREL 5-20) 5-20 MG per capsule; Take 1 capsule by mouth Daily.  -     albuterol (VENTOLIN HFA) 108 (90 Base) MCG/ACT inhaler; Inhale 2 puffs Every 6 (Six) Hours As Needed for Wheezing or Shortness of Air.  -     atorvastatin (LIPITOR) 10 MG tablet; Take 1 tablet by mouth Daily.  -     benzonatate (TESSALON) 200 MG capsule; Take 1 capsule by mouth 3 (Three) Times a Day As Needed for Cough.  -     omeprazole (priLOSEC) 20 MG capsule; Take 1 capsule by mouth Daily.  -     vitamin D (ERGOCALCIFEROL) 24204 units capsule capsule; Take 1  capsule by mouth 2 (Two) Times a Week.  -     atomoxetine (STRATTERA) 25 MG capsule; Take 1 capsule by mouth Daily.    The patient has read and signed the [facility] Controlled Substance Contract.  I will continue to see patient for regular follow up appointments. Patient is well controlled on the medication.  DEYVI has been reviewed by me and is updated every 3 months. The patient is aware of the potential for addiction and dependence.     Continue Lyrica for radicular back pain.     Will try Strattera for adult ADD symptoms is unlike to try non-stimulant due to her hypertension.    Refilled home meds as above including medicines for hypertension and hyperlipidemia, we'll get labs in November and December.             This document has been electronically signed by Radha Simental MD on September 6, 2018 12:15 PM

## 2018-12-12 ENCOUNTER — CLINICAL SUPPORT (OUTPATIENT)
Dept: FAMILY MEDICINE CLINIC | Facility: CLINIC | Age: 53
End: 2018-12-12

## 2018-12-12 ENCOUNTER — LAB (OUTPATIENT)
Dept: LAB | Facility: OTHER | Age: 53
End: 2018-12-12

## 2018-12-12 DIAGNOSIS — M81.0 OSTEOPOROSIS, UNSPECIFIED OSTEOPOROSIS TYPE, UNSPECIFIED PATHOLOGICAL FRACTURE PRESENCE: Primary | ICD-10-CM

## 2018-12-12 DIAGNOSIS — I10 ESSENTIAL HYPERTENSION: ICD-10-CM

## 2018-12-12 DIAGNOSIS — E78.5 HYPERLIPIDEMIA, UNSPECIFIED HYPERLIPIDEMIA TYPE: ICD-10-CM

## 2018-12-12 DIAGNOSIS — E55.9 VITAMIN D DEFICIENCY: ICD-10-CM

## 2018-12-12 LAB
25(OH)D3 SERPL-MCNC: 56.7 NG/ML (ref 30–100)
ALBUMIN SERPL-MCNC: 4.8 G/DL (ref 3.5–5)
ALBUMIN/GLOB SERPL: 1.4 G/DL (ref 1.1–1.8)
ALP SERPL-CCNC: 74 U/L (ref 38–126)
ALT SERPL W P-5'-P-CCNC: 38 U/L
ANION GAP SERPL CALCULATED.3IONS-SCNC: 7 MMOL/L (ref 5–15)
AST SERPL-CCNC: 32 U/L (ref 14–36)
BASOPHILS # BLD AUTO: 0.02 10*3/MM3 (ref 0–0.2)
BASOPHILS NFR BLD AUTO: 0.2 % (ref 0–2)
BILIRUB SERPL-MCNC: 0.3 MG/DL (ref 0.2–1.3)
BUN BLD-MCNC: 7 MG/DL (ref 7–17)
BUN/CREAT SERPL: 10.4 (ref 7–25)
CALCIUM SPEC-SCNC: 10.1 MG/DL (ref 8.4–10.2)
CHLORIDE SERPL-SCNC: 107 MMOL/L (ref 98–107)
CHOLEST SERPL-MCNC: 256 MG/DL (ref 150–200)
CO2 SERPL-SCNC: 27 MMOL/L (ref 22–30)
CREAT BLD-MCNC: 0.67 MG/DL (ref 0.52–1.04)
DEPRECATED RDW RBC AUTO: 41.4 FL (ref 36.4–46.3)
EOSINOPHIL # BLD AUTO: 0.07 10*3/MM3 (ref 0–0.7)
EOSINOPHIL NFR BLD AUTO: 0.7 % (ref 0–7)
ERYTHROCYTE [DISTWIDTH] IN BLOOD BY AUTOMATED COUNT: 13.4 % (ref 11.5–14.5)
GFR SERPL CREATININE-BSD FRML MDRD: 92 ML/MIN/1.73 (ref 51–120)
GLOBULIN UR ELPH-MCNC: 3.4 GM/DL (ref 2.3–3.5)
GLUCOSE BLD-MCNC: 159 MG/DL (ref 74–99)
HCT VFR BLD AUTO: 42.6 % (ref 35–45)
HDLC SERPL-MCNC: 38 MG/DL (ref 40–59)
HGB BLD-MCNC: 14.2 G/DL (ref 12–15.5)
LDLC SERPL CALC-MCNC: 151 MG/DL
LDLC/HDLC SERPL: 3.98 {RATIO} (ref 0–3.22)
LYMPHOCYTES # BLD AUTO: 4.09 10*3/MM3 (ref 0.6–4.2)
LYMPHOCYTES NFR BLD AUTO: 41.5 % (ref 10–50)
MCH RBC QN AUTO: 28.9 PG (ref 26.5–34)
MCHC RBC AUTO-ENTMCNC: 33.3 G/DL (ref 31.4–36)
MCV RBC AUTO: 86.6 FL (ref 80–98)
MONOCYTES # BLD AUTO: 0.78 10*3/MM3 (ref 0–0.9)
MONOCYTES NFR BLD AUTO: 7.9 % (ref 0–12)
NEUTROPHILS # BLD AUTO: 4.9 10*3/MM3 (ref 2–8.6)
NEUTROPHILS NFR BLD AUTO: 49.7 % (ref 37–80)
PLATELET # BLD AUTO: 322 10*3/MM3 (ref 150–450)
PMV BLD AUTO: 9.6 FL (ref 8–12)
POTASSIUM BLD-SCNC: 3.8 MMOL/L (ref 3.4–5)
PROT SERPL-MCNC: 8.2 G/DL (ref 6.3–8.2)
RBC # BLD AUTO: 4.92 10*6/MM3 (ref 3.77–5.16)
SODIUM BLD-SCNC: 141 MMOL/L (ref 137–145)
T4 FREE SERPL-MCNC: 1.06 NG/DL (ref 0.78–2.19)
TRIGL SERPL-MCNC: 333 MG/DL
TSH SERPL DL<=0.05 MIU/L-ACNC: 1.45 MIU/ML (ref 0.46–4.68)
VLDLC SERPL-MCNC: 66.6 MG/DL
WBC NRBC COR # BLD: 9.86 10*3/MM3 (ref 3.2–9.8)

## 2018-12-12 PROCEDURE — 96372 THER/PROPH/DIAG INJ SC/IM: CPT | Performed by: FAMILY MEDICINE

## 2018-12-12 PROCEDURE — 82306 VITAMIN D 25 HYDROXY: CPT | Performed by: FAMILY MEDICINE

## 2018-12-12 PROCEDURE — 80061 LIPID PANEL: CPT | Performed by: FAMILY MEDICINE

## 2018-12-12 PROCEDURE — 80053 COMPREHEN METABOLIC PANEL: CPT | Performed by: FAMILY MEDICINE

## 2018-12-12 PROCEDURE — 85025 COMPLETE CBC W/AUTO DIFF WBC: CPT | Performed by: FAMILY MEDICINE

## 2018-12-12 PROCEDURE — 84443 ASSAY THYROID STIM HORMONE: CPT | Performed by: FAMILY MEDICINE

## 2018-12-12 PROCEDURE — 84439 ASSAY OF FREE THYROXINE: CPT | Performed by: FAMILY MEDICINE

## 2018-12-12 PROCEDURE — 36415 COLL VENOUS BLD VENIPUNCTURE: CPT | Performed by: FAMILY MEDICINE

## 2018-12-12 NOTE — PROGRESS NOTES
Please call the patient regarding her abnormal result.  Cholesterol still high but is steadily coming down.  Recheck that in 6 months.

## 2018-12-17 NOTE — PROGRESS NOTES
Notify patient test results are ok, the white blood cells were very minimally elevated, will repeat in 3-6 months

## 2019-02-19 ENCOUNTER — OFFICE VISIT (OUTPATIENT)
Dept: FAMILY MEDICINE CLINIC | Facility: CLINIC | Age: 54
End: 2019-02-19

## 2019-02-19 VITALS
SYSTOLIC BLOOD PRESSURE: 112 MMHG | WEIGHT: 153 LBS | HEIGHT: 60 IN | BODY MASS INDEX: 30.04 KG/M2 | DIASTOLIC BLOOD PRESSURE: 74 MMHG

## 2019-02-19 DIAGNOSIS — M50.20 PROTRUSION OF CERVICAL INTERVERTEBRAL DISC: Primary | ICD-10-CM

## 2019-02-19 DIAGNOSIS — M51.27 LUMBAGO-SCIATICA DUE TO DISPLACEMENT OF LUMBAR INTERVERTEBRAL DISC: ICD-10-CM

## 2019-02-19 DIAGNOSIS — T14.8XXA PULLED MUSCLE: ICD-10-CM

## 2019-02-19 PROCEDURE — 99214 OFFICE O/P EST MOD 30 MIN: CPT | Performed by: FAMILY MEDICINE

## 2019-02-19 RX ORDER — OMEPRAZOLE 20 MG/1
20 CAPSULE, DELAYED RELEASE ORAL DAILY
Qty: 60 CAPSULE | Refills: 5 | Status: SHIPPED | OUTPATIENT
Start: 2019-02-19 | End: 2019-09-24 | Stop reason: SDUPTHER

## 2019-02-19 RX ORDER — TIZANIDINE 4 MG/1
4 TABLET ORAL EVERY 8 HOURS PRN
Qty: 90 TABLET | Refills: 5 | Status: SHIPPED | OUTPATIENT
Start: 2019-02-19 | End: 2019-07-30 | Stop reason: SDUPTHER

## 2019-02-19 RX ORDER — AMLODIPINE BESYLATE AND BENAZEPRIL HYDROCHLORIDE 5; 20 MG/1; MG/1
1 CAPSULE ORAL DAILY
Qty: 30 CAPSULE | Refills: 5 | Status: SHIPPED | OUTPATIENT
Start: 2019-02-19 | End: 2019-09-24 | Stop reason: SDUPTHER

## 2019-02-19 RX ORDER — ALBUTEROL SULFATE 90 UG/1
2 AEROSOL, METERED RESPIRATORY (INHALATION) EVERY 6 HOURS PRN
Qty: 1 INHALER | Refills: 3 | Status: SHIPPED | OUTPATIENT
Start: 2019-02-19 | End: 2019-09-24 | Stop reason: SDUPTHER

## 2019-02-19 RX ORDER — ERGOCALCIFEROL 1.25 MG/1
50000 CAPSULE ORAL 2 TIMES WEEKLY
Qty: 8 CAPSULE | Refills: 5 | Status: SHIPPED | OUTPATIENT
Start: 2019-02-21 | End: 2019-09-24 | Stop reason: SDUPTHER

## 2019-02-19 RX ORDER — PREGABALIN 100 MG/1
100 CAPSULE ORAL 3 TIMES DAILY
Qty: 90 CAPSULE | Refills: 2 | Status: SHIPPED | OUTPATIENT
Start: 2019-02-19 | End: 2019-07-30 | Stop reason: SDUPTHER

## 2019-02-19 NOTE — PROGRESS NOTES
Subjective   Isabel Villalba is a 53 y.o. female who presents to the office for follow-up on low back pain radiating into the legs, hypertension, hyperlipidemia.  Recently, she felt a pop when she was trying to lift something.  The pop was in the bicep area of her left arm and she still has weakness and difficulty lifting things or flexing her arm.  It has been several weeks and does not seem to be getting better.  She would like a referral to pain management for the neck and back pain.  Surgery has been discussed but she is really not ready to go through with this yet.    History of Present Illness   Hypertension   This is a chronic problem. The current episode started more than 1 year ago. The problem has been waxing and waning since onset. Associated symptoms include anxiety, headaches and malaise/fatigue. Pertinent negatives include no blurred vision, chest pain, neck pain, orthopnea, palpitations, peripheral edema, PND, shortness of breath or sweats. There are no associated agents to hypertension. Risk factors for coronary artery disease include dyslipidemia and family history. Past treatments include   The current treatment provides moderate improvement. There are no compliance problems.    Back Pain   This is a chronic problem. The current episode started more than 1 year ago. The problem occurs constantly. The problem is unchanged. The pain is present in the lumbar spine. The quality of the pain is described as shooting, stabbing and aching. The pain radiates to the right knee and left knee. The pain is at a severity of 8/10. The pain is severe. The pain is the same all the time. The symptoms are aggravated by standing, twisting, position, bending and sitting. Stiffness is present at night, all day and in the morning. Associated symptoms include leg pain and tingling. Pertinent negatives include no abdominal pain, bladder incontinence, bowel incontinence, chest pain, dysuria, fever, headaches, numbness,  paresis, paresthesias, pelvic pain, perianal numbness, weakness or weight loss. Risk factors include sedentary lifestyle. Patient has tried analgesics, NSAIDs, muscle relaxant and heat for the symptoms. The treatment provided mild relief.   Neck Pain    This is a chronic problem. The current episode started more than 1 year ago. The problem occurs constantly. The problem has been unchanged. The pain is present in the midline. The quality of the pain is described as aching and shooting. The pain is at a severity of 8/10. The pain is severe. The symptoms are aggravated by twisting, position and bending. The pain is same all the time. Stiffness is present in the morning, all day and at night. Associated symptoms include tingling. Pertinent negatives include no chest pain, fever, headaches, leg pain, numbness, pain with swallowing, paresis, syncope, trouble swallowing, visual change, weakness or weight loss. He has tried acetaminophen, muscle relaxants and NSAIDs for the symptoms. The treatment provided mild relief.     Review of Systems   Constitutional: Positive for fatigue.   HENT: Negative for congestion, postnasal drip, rhinorrhea, sinus pressure and sore throat.    Respiratory: Negative for choking and shortness of breath.    Cardiovascular: Negative.  Negative for chest pain and palpitations.   Gastrointestinal: Negative.    Musculoskeletal: Positive for arthralgias, back pain and gait problem. Negative for myalgias.   Skin: Negative.    Allergic/Immunologic: Negative for immunocompromised state.   Neurological: Negative for dizziness, tremors, seizures, syncope, weakness and numbness.   Hematological: Negative.    Psychiatric/Behavioral: Negative for agitation, confusion, decreased concentration, dysphoric mood, sleep disturbance and suicidal ideas. The patient is not nervous/anxious.    All other systems reviewed and are negative.      Objective   Physical Exam   Constitutional: She is oriented to person,  place, and time. She appears well-developed and well-nourished.   HENT:   Head: Normocephalic and atraumatic.       Eyes: Conjunctivae and EOM are normal. Pupils are equal, round, and reactive to light.   Neck: Normal range of motion. Neck supple. No JVD present. No tracheal deviation present. No thyromegaly present.   Cardiovascular: Normal rate, regular rhythm, normal heart sounds and intact distal pulses.   No murmur heard.  Pulmonary/Chest: Effort normal and breath sounds normal. She has no wheezes.   Abdominal: Soft. Bowel sounds are normal. She exhibits no distension. There is no tenderness.   Musculoskeletal: Normal range of motion. She exhibits no edema.   Lymphadenopathy:     She has no cervical adenopathy.   Neurological: She is alert and oriented to person, place, and time. Coordination normal.   Skin: Skin is warm and dry. No rash noted.   Psychiatric: She has a normal mood and affect.   Nursing note and vitals reviewed.    Assessment/Plan   Isabel was seen today for follow-up.    Diagnoses and all orders for this visit:    Protrusion of cervical intervertebral disc  -     Ambulatory Referral to Pain Management    Lumbago-sciatica due to displacement of lumbar intervertebral disc  -     Ambulatory Referral to Pain Management    Pulled muscle  -     MRI humerus left wo contrast; Future    Other orders  -     pregabalin (LYRICA) 100 MG capsule; Take 1 capsule by mouth 3 (Three) Times a Day.  -     omeprazole (priLOSEC) 20 MG capsule; Take 1 capsule by mouth Daily.  -     tiZANidine (ZANAFLEX) 4 MG tablet; Take 1 tablet by mouth Every 8 (Eight) Hours As Needed for Muscle Spasms.  -     amLODIPine-benazepril (LOTREL 5-20) 5-20 MG per capsule; Take 1 capsule by mouth Daily.  -     vitamin D (ERGOCALCIFEROL) 85662 units capsule capsule; Take 1 capsule by mouth 2 (Two) Times a Week.  -     albuterol sulfate HFA (VENTOLIN HFA) 108 (90 Base) MCG/ACT inhaler; Inhale 2 puffs Every 6 (Six) Hours As Needed for  Wheezing or Shortness of Air.    The patient has read and signed the [facility] Controlled Substance Contract.  I will continue to see patient for regular follow up appointments. Patient is well controlled on the medication.  DEYVI has been reviewed by me and is updated every 3 months. The patient is aware of the potential for addiction and dependence.     Continue Lyrica for radicular back pain.   We will get an MRI of the left upper arm to evaluate for suspected tendon or muscle rupture     Continue with current medications as above for hypertension, vitamin D    Make new referral to pain management for both the back and the neck pain.            This document has been electronically signed by Radha Simental MD on February 19, 2019 9:41 AM

## 2019-03-05 ENCOUNTER — HOSPITAL ENCOUNTER (OUTPATIENT)
Dept: MRI IMAGING | Facility: HOSPITAL | Age: 54
Discharge: HOME | End: 2019-03-05
Admitting: FAMILY MEDICINE

## 2019-03-05 DIAGNOSIS — T14.8XXA PULLED MUSCLE: ICD-10-CM

## 2019-03-05 PROCEDURE — 73218 MRI UPPER EXTREMITY W/O DYE: CPT

## 2019-03-06 NOTE — PROGRESS NOTES
Please call the patient regarding her abnormal result.  She has a rotator cuff tear and needs to see orthopedics ASAP.  Please see where she would like to go.

## 2019-03-08 DIAGNOSIS — M75.102 TEAR OF LEFT ROTATOR CUFF, UNSPECIFIED TEAR EXTENT: Primary | ICD-10-CM

## 2019-04-05 ENCOUNTER — OFFICE VISIT (OUTPATIENT)
Dept: ORTHOPEDIC SURGERY | Facility: CLINIC | Age: 54
End: 2019-04-05

## 2019-04-05 VITALS — HEIGHT: 60 IN | BODY MASS INDEX: 30.04 KG/M2 | WEIGHT: 153 LBS

## 2019-04-05 DIAGNOSIS — M25.512 LEFT SHOULDER PAIN, UNSPECIFIED CHRONICITY: Primary | ICD-10-CM

## 2019-04-05 DIAGNOSIS — M75.102 ROTATOR CUFF SYNDROME OF LEFT SHOULDER: ICD-10-CM

## 2019-04-05 DIAGNOSIS — M67.922 TENDINOPATHY OF LEFT BICEPS TENDON: ICD-10-CM

## 2019-04-05 PROCEDURE — 99203 OFFICE O/P NEW LOW 30 MIN: CPT | Performed by: ORTHOPAEDIC SURGERY

## 2019-04-05 PROCEDURE — 20610 DRAIN/INJ JOINT/BURSA W/O US: CPT | Performed by: ORTHOPAEDIC SURGERY

## 2019-04-05 RX ORDER — TRIAMCINOLONE ACETONIDE 40 MG/ML
80 INJECTION, SUSPENSION INTRA-ARTICULAR; INTRAMUSCULAR
Status: COMPLETED | OUTPATIENT
Start: 2019-04-05 | End: 2019-04-05

## 2019-04-05 RX ORDER — LIDOCAINE HYDROCHLORIDE 10 MG/ML
4 INJECTION, SOLUTION INFILTRATION; PERINEURAL
Status: COMPLETED | OUTPATIENT
Start: 2019-04-05 | End: 2019-04-05

## 2019-04-05 RX ORDER — AMLODIPINE BESYLATE AND BENAZEPRIL HYDROCHLORIDE 5; 20 MG/1; MG/1
1 CAPSULE ORAL DAILY
Qty: 30 CAPSULE | Refills: 5 | Status: SHIPPED | OUTPATIENT
Start: 2019-04-05 | End: 2019-07-30 | Stop reason: SDUPTHER

## 2019-04-05 RX ADMIN — LIDOCAINE HYDROCHLORIDE 4 ML: 10 INJECTION, SOLUTION INFILTRATION; PERINEURAL at 13:51

## 2019-04-05 RX ADMIN — TRIAMCINOLONE ACETONIDE 80 MG: 40 INJECTION, SUSPENSION INTRA-ARTICULAR; INTRAMUSCULAR at 13:51

## 2019-04-05 NOTE — PROGRESS NOTES
Isabel Villalba is a 53 y.o. female   Primary provider:  Radha Simental MD       Chief Complaint   Patient presents with   • Left Shoulder - Pain       HISTORY OF PRESENT ILLNESS: Patient is here today for left shoulder pain. Patient states that she was picking up a 50 pound bag of dog food approximately 6 months ago. Patient had xrays and MRI prior to her visit today.  It did not go well after that and initially had some pain and swelling the anterior portion of her upper arm.  It still hurts with motion somewhat better with rest.  Dr. [facility] for MRI scan of her upper arm which shows some strain of the distal biceps without definite tear some arthritic change at the joint but no obvious full-thickness tear.    History of Present Illness     CONCURRENT MEDICAL HISTORY:    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   • Epigastric pain    • Essential hypertension    • Fatigue    • Gastritis    • Gastroesophageal reflux disease    • Gouty arthropathy     left first MTP joint   • H/O screening mammography 06/24/2014   • Hallux limitus of right foot    • Hernia of abdominal wall    • Hyperlipidemia    • Irreducible incisional hernia     vs lipoma   • Lumbago with sciatica    • Lumbago-sciatica due to displacement of lumbar intervertebral disc    • Protrusion of cervical intervertebral disc     Broad based intervertebral disc protrusion - C5-6, minimally impressing upon ventral thecal sac by MRI done 4/7/15    • Pruritic rash    • Systolic murmur    • Vitamin B12 deficiency    • Weight increase        Allergies   Allergen Reactions   • Morphine And Related Nausea And Vomiting         Current Outpatient Medications:   •  albuterol sulfate HFA (VENTOLIN HFA) 108 (90 Base) MCG/ACT inhaler, Inhale 2 puffs Every 6 (Six) Hours As Needed for Wheezing or Shortness of Air., Disp: 1 inhaler, Rfl: 3  •   amLODIPine-benazepril (LOTREL 5-20) 5-20 MG per capsule, Take 1 capsule by mouth Daily., Disp: 30 capsule, Rfl: 5  •  amLODIPine-benazepril (LOTREL 5-20) 5-20 MG per capsule, TAKE 1 CAPSULE BY MOUTH DAILY., Disp: 30 capsule, Rfl: 5  •  atomoxetine (STRATTERA) 25 MG capsule, Take 1 capsule by mouth Daily., Disp: 60 capsule, Rfl: 5  •  atorvastatin (LIPITOR) 10 MG tablet, Take 1 tablet by mouth Daily., Disp: 30 tablet, Rfl: 5  •  benzonatate (TESSALON) 200 MG capsule, Take 1 capsule by mouth 3 (Three) Times a Day As Needed for Cough., Disp: 15 capsule, Rfl: 2  •  fluticasone (FLONASE) 50 MCG/ACT nasal spray, 2 sprays into each nostril Daily., Disp: 1 each, Rfl: 5  •  omeprazole (priLOSEC) 20 MG capsule, Take 1 capsule by mouth Daily., Disp: 60 capsule, Rfl: 5  •  pregabalin (LYRICA) 100 MG capsule, Take 1 capsule by mouth 3 (Three) Times a Day., Disp: 90 capsule, Rfl: 2  •  PROLIA 60 MG/ML solution syringe, INJECT 1 ML UNDER THE SKIN 1 (ONE) TIME FOR 1 DOSE., Disp: 1 mL, Rfl: 1  •  tiZANidine (ZANAFLEX) 4 MG tablet, Take 1 tablet by mouth Every 8 (Eight) Hours As Needed for Muscle Spasms., Disp: 90 tablet, Rfl: 5  •  vitamin D (ERGOCALCIFEROL) 66708 units capsule capsule, Take 1 capsule by mouth 2 (Two) Times a Week., Disp: 8 capsule, Rfl: 5    Past Surgical History:   Procedure Laterality Date   • BREAST BIOPSY     • CARPAL TUNNEL RELEASE Bilateral 1995   • CHOLECYSTECTOMY  2004   • INJECTION OF MEDICATION  11/17/2015    Bicillin LA 1.2   • INJECTION OF MEDICATION  11/17/2015    depo medrol   • TUBAL ABDOMINAL LIGATION  2004       Family History   Problem Relation Age of Onset   • ADD / ADHD Other    • Bipolar disorder Other    • Breast cancer Other    • Cancer Other    • Coronary artery disease Other    • Diabetes Other    • Heart disease Other    • Hyperlipidemia Other    • Hypertension Other    • Lung cancer Other    • Migraines Other    • Polycystic ovary syndrome Other    • Schizophrenia Other    • Stroke Other   "  • Tuberculosis Other    • Other Other         respiratory disorder; smoking tobacco   • Breast cancer Paternal Grandmother    • Hypertension Mother    • Cancer Father         Social History     Socioeconomic History   • Marital status:      Spouse name: Not on file   • Number of children: Not on file   • Years of education: Not on file   • Highest education level: Not on file   Tobacco Use   • Smoking status: Current Every Day Smoker     Packs/day: 1.00     Types: Cigarettes   • Smokeless tobacco: Never Used   Substance and Sexual Activity   • Alcohol use: No   • Drug use: No   • Sexual activity: Defer        Review of Systems   Constitutional: Positive for activity change. Negative for chills and fever.   HENT: Negative.  Negative for facial swelling.    Eyes: Negative.    Respiratory: Negative.  Negative for apnea and shortness of breath.    Cardiovascular: Negative.  Negative for chest pain and leg swelling.   Gastrointestinal: Negative.  Negative for abdominal pain, nausea and vomiting.   Endocrine: Negative.    Genitourinary: Negative.  Negative for dysuria.   Musculoskeletal: Positive for arthralgias.        Left shoulder pain     Skin: Negative.  Negative for color change.   Allergic/Immunologic: Negative.    Neurological: Negative.  Negative for seizures and syncope.   Hematological: Negative.  Negative for adenopathy.   Psychiatric/Behavioral: Negative.  Negative for dysphoric mood.       PHYSICAL EXAMINATION:       Ht 152.4 cm (60\")   Wt 69.4 kg (153 lb)   BMI 29.88 kg/m²     Physical Exam   Constitutional: She is oriented to person, place, and time. She appears well-developed.   HENT:   Head: Normocephalic and atraumatic.   Eyes: EOM are normal. Pupils are equal, round, and reactive to light.   Neck: Neck supple.   Pulmonary/Chest: Effort normal.   Musculoskeletal: She exhibits tenderness.   Neurological: She is alert and oriented to person, place, and time.   Skin: Skin is warm and dry. "   Psychiatric: She has a normal mood and affect.       GAIT:     [x]  Normal  []  Antalgic    Assistive device: []  None  []  Walker     []  Crutches  []  Cane     []  Wheelchair  []  Stretcher    Ortho Exam  No obvious deformity the biceps somewhat tender approximately little bit of tenderness and pain resistance of supination.  Positive impingement pain resistance of the supraspinatus no breakaway weakness drop test is negative positive impingement here.    No results found.    MRI scan of the humerus is real shows some irritation of the distal biceps tendon proximally she looks okay.  The cuff is not well visualized in this MRI scan of the humerus.    ASSESSMENT:    Diagnoses and all orders for this visit:    Left shoulder pain, unspecified chronicity  -     Large Joint Arthrocentesis: L subacromial bursa    Rotator cuff syndrome of left shoulder    Tendinopathy of left biceps tendon          PLAN I am going to inject her left subacromial bursa with good initial relief.  I did try some Thera-Band exercises and follow-up in 3-4 weeks to reevaluate her.  If not significant better may get a dedicated scan of the shoulder to evaluate her.  Her distal biceps tendon does not appear to be very symptomatic and although she may have strained it it appears to be healing well and does not appear to have a full-thickness tear here.  Her pain is up in her upper shoulder region on today's exam and that is her chief complaint  Large Joint Arthrocentesis: L subacromial bursa  Date/Time: 4/5/2019 1:51 PM  Consent given by: patient  Site marked: site marked  Timeout: Immediately prior to procedure a time out was called to verify the correct patient, procedure, equipment, support staff and site/side marked as required   Supporting Documentation  Indications: pain   Procedure Details  Location: shoulder - L subacromial bursa  Preparation: Patient was prepped and draped in the usual sterile fashion  Needle size: 24 G  Medications  administered: 4 mL lidocaine 1 %; 80 mg triamcinolone acetonide 40 MG/ML              No Follow-up on file.        This document has been electronically signed by Kar Aranda MD on April 5, 2019 4:34 PM

## 2019-05-03 ENCOUNTER — OFFICE VISIT (OUTPATIENT)
Dept: ORTHOPEDIC SURGERY | Facility: CLINIC | Age: 54
End: 2019-05-03

## 2019-05-03 VITALS — WEIGHT: 153 LBS | BODY MASS INDEX: 30.04 KG/M2 | HEIGHT: 60 IN

## 2019-05-03 DIAGNOSIS — M75.102 ROTATOR CUFF SYNDROME OF LEFT SHOULDER: ICD-10-CM

## 2019-05-03 DIAGNOSIS — M67.922 TENDINOPATHY OF LEFT BICEPS TENDON: ICD-10-CM

## 2019-05-03 DIAGNOSIS — M25.512 LEFT SHOULDER PAIN, UNSPECIFIED CHRONICITY: Primary | ICD-10-CM

## 2019-05-03 PROCEDURE — 99213 OFFICE O/P EST LOW 20 MIN: CPT | Performed by: ORTHOPAEDIC SURGERY

## 2019-05-03 NOTE — PROGRESS NOTES
Isabel Villalba is a 53 y.o. female returns for     Chief Complaint   Patient presents with   • Left Shoulder - Follow-up       HISTORY OF PRESENT ILLNESS: f/u on left shoulder patient states that she has a pain score of 2 today, significant better with the shot less pain discomfort a little soreness on certain movements but overall progressing nicely she is pleased with her progress.     CONCURRENT MEDICAL HISTORY:    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   • Epigastric pain    • Essential hypertension    • Fatigue    • Gastritis    • Gastroesophageal reflux disease    • Gouty arthropathy     left first MTP joint   • H/O screening mammography 06/24/2014   • Hallux limitus of right foot    • Hernia of abdominal wall    • Hyperlipidemia    • Irreducible incisional hernia     vs lipoma   • Lumbago with sciatica    • Lumbago-sciatica due to displacement of lumbar intervertebral disc    • Protrusion of cervical intervertebral disc     Broad based intervertebral disc protrusion - C5-6, minimally impressing upon ventral thecal sac by MRI done 4/7/15    • Pruritic rash    • Systolic murmur    • Vitamin B12 deficiency    • Weight increase        Allergies   Allergen Reactions   • Morphine And Related Nausea And Vomiting         Current Outpatient Medications:   •  albuterol sulfate HFA (VENTOLIN HFA) 108 (90 Base) MCG/ACT inhaler, Inhale 2 puffs Every 6 (Six) Hours As Needed for Wheezing or Shortness of Air., Disp: 1 inhaler, Rfl: 3  •  amLODIPine-benazepril (LOTREL 5-20) 5-20 MG per capsule, Take 1 capsule by mouth Daily., Disp: 30 capsule, Rfl: 5  •  amLODIPine-benazepril (LOTREL 5-20) 5-20 MG per capsule, TAKE 1 CAPSULE BY MOUTH DAILY., Disp: 30 capsule, Rfl: 5  •  atomoxetine (STRATTERA) 25 MG capsule, Take 1 capsule by mouth Daily., Disp: 60 capsule, Rfl: 5  •  atorvastatin (LIPITOR) 10 MG tablet, Take 1  "tablet by mouth Daily., Disp: 30 tablet, Rfl: 5  •  benzonatate (TESSALON) 200 MG capsule, Take 1 capsule by mouth 3 (Three) Times a Day As Needed for Cough., Disp: 15 capsule, Rfl: 2  •  fluticasone (FLONASE) 50 MCG/ACT nasal spray, 2 sprays into each nostril Daily., Disp: 1 each, Rfl: 5  •  omeprazole (priLOSEC) 20 MG capsule, Take 1 capsule by mouth Daily., Disp: 60 capsule, Rfl: 5  •  pregabalin (LYRICA) 100 MG capsule, Take 1 capsule by mouth 3 (Three) Times a Day., Disp: 90 capsule, Rfl: 2  •  PROLIA 60 MG/ML solution syringe, INJECT 1 ML UNDER THE SKIN 1 (ONE) TIME FOR 1 DOSE., Disp: 1 mL, Rfl: 1  •  tiZANidine (ZANAFLEX) 4 MG tablet, Take 1 tablet by mouth Every 8 (Eight) Hours As Needed for Muscle Spasms., Disp: 90 tablet, Rfl: 5  •  vitamin D (ERGOCALCIFEROL) 70202 units capsule capsule, Take 1 capsule by mouth 2 (Two) Times a Week., Disp: 8 capsule, Rfl: 5    Past Surgical History:   Procedure Laterality Date   • BREAST BIOPSY     • CARPAL TUNNEL RELEASE Bilateral 1995   • CHOLECYSTECTOMY  2004   • INJECTION OF MEDICATION  11/17/2015    Bicillin LA 1.2   • INJECTION OF MEDICATION  11/17/2015    depo medrol   • TUBAL ABDOMINAL LIGATION  2004           ROS: Review of systems has been updated as of today's date.  All other systems are negative except as noted previously.    PHYSICAL EXAMINATION:       Ht 152.4 cm (60\")   Wt 69.4 kg (153 lb)   BMI 29.88 kg/m²     Physical Exam   Constitutional: She is oriented to person, place, and time. She appears well-developed.   HENT:   Head: Normocephalic and atraumatic.   Eyes: EOM are normal. Pupils are equal, round, and reactive to light.   Neck: Neck supple.   Pulmonary/Chest: Effort normal.   Musculoskeletal: Normal range of motion. She exhibits no tenderness.   Neurological: She is alert and oriented to person, place, and time.   Skin: Skin is warm and dry.   Psychiatric: She has a normal mood and affect.       GAIT:     [x]  Normal  []  Antalgic    Assistive " device: []  None  []  Walker     []  Crutches  []  Cane     []  Wheelchair  []  Stretcher    Ortho Exam  Motion not particular tender distally good motion of the shoulder good forward elevation.  Logically intact distally.    No results found.          ASSESSMENT:    Diagnoses and all orders for this visit:    Left shoulder pain, unspecified chronicity    Rotator cuff syndrome of left shoulder    Tendinopathy of left biceps tendon          PLAN doing very well at this point I think she has some cuff irritation she will continue her exercises limit her activities.  If her problems persist and/or return she will let me know we will see her back at that point, sooner with any problems.    No Follow-up on file.      This document has been electronically signed by Cici Gonzales MA on May 3, 2019 1:05 PM

## 2019-05-13 RX ORDER — TIZANIDINE 4 MG/1
4 TABLET ORAL EVERY 8 HOURS PRN
Qty: 90 TABLET | Refills: 5 | Status: SHIPPED | OUTPATIENT
Start: 2019-05-13 | End: 2019-09-24 | Stop reason: SDUPTHER

## 2019-07-16 DIAGNOSIS — Z12.31 ENCOUNTER FOR SCREENING MAMMOGRAM FOR MALIGNANT NEOPLASM OF BREAST: Primary | ICD-10-CM

## 2019-07-30 ENCOUNTER — OFFICE VISIT (OUTPATIENT)
Dept: FAMILY MEDICINE CLINIC | Facility: CLINIC | Age: 54
End: 2019-07-30

## 2019-07-30 VITALS
WEIGHT: 153 LBS | DIASTOLIC BLOOD PRESSURE: 70 MMHG | HEIGHT: 60 IN | SYSTOLIC BLOOD PRESSURE: 122 MMHG | BODY MASS INDEX: 30.04 KG/M2

## 2019-07-30 DIAGNOSIS — I10 ESSENTIAL HYPERTENSION: ICD-10-CM

## 2019-07-30 DIAGNOSIS — R00.2 PALPITATIONS: ICD-10-CM

## 2019-07-30 DIAGNOSIS — E53.8 VITAMIN B12 DEFICIENCY: ICD-10-CM

## 2019-07-30 DIAGNOSIS — E55.9 VITAMIN D DEFICIENCY: ICD-10-CM

## 2019-07-30 DIAGNOSIS — E78.5 HYPERLIPIDEMIA, UNSPECIFIED HYPERLIPIDEMIA TYPE: ICD-10-CM

## 2019-07-30 DIAGNOSIS — M51.27 LUMBAGO-SCIATICA DUE TO DISPLACEMENT OF LUMBAR INTERVERTEBRAL DISC: Primary | ICD-10-CM

## 2019-07-30 PROCEDURE — 99214 OFFICE O/P EST MOD 30 MIN: CPT | Performed by: FAMILY MEDICINE

## 2019-07-30 RX ORDER — PREGABALIN 150 MG/1
150 CAPSULE ORAL 3 TIMES DAILY
Qty: 90 CAPSULE | Refills: 2 | Status: SHIPPED | OUTPATIENT
Start: 2019-07-30 | End: 2019-09-24 | Stop reason: SDUPTHER

## 2019-07-30 NOTE — PROGRESS NOTES
Subjective   Isabel Villalba is a 53 y.o. female who presents to the office for follow-up on low back pain radiating into the legs, hypertension, hyperlipidemia.  She has chronic low back and neck pain.  MRIs have shown degenerative changes and disc herniations.  Surgery has been discussed in the past.  She would like to see about a new MRI of the low back as she has had increasing pain and it has been been at least a couple years since it has been looked at.  She is due for labs for lipids, hypertension, and vitamin deficiencies.    She is been having some palpitations and wonders about getting an echocardiogram.  She had one in the past for similar symptoms that was normal but is been years ago.  Symptoms have been present again for about a month.  They usually come on just at rest and have been random.    History of Present Illness   Hypertension   This is a chronic problem. The current episode started more than 1 year ago. The problem has been waxing and waning since onset. Associated symptoms include anxiety, headaches and malaise/fatigue. Pertinent negatives include no blurred vision, chest pain, neck pain, orthopnea, palpitations, peripheral edema, PND, shortness of breath or sweats. There are no associated agents to hypertension. Risk factors for coronary artery disease include dyslipidemia and family history. Past treatments include   The current treatment provides moderate improvement. There are no compliance problems.    Back Pain   This is a chronic problem. The current episode started more than 1 year ago. The problem occurs constantly. The problem is unchanged. The pain is present in the lumbar spine. The quality of the pain is described as shooting, stabbing and aching. The pain radiates to the right knee and left knee. The pain is at a severity of 8/10. The pain is severe. The pain is the same all the time. The symptoms are aggravated by standing, twisting, position, bending and sitting.  Stiffness is present at night, all day and in the morning. Associated symptoms include leg pain and tingling. Pertinent negatives include no abdominal pain, bladder incontinence, bowel incontinence, chest pain, dysuria, fever, headaches, numbness, paresis, paresthesias, pelvic pain, perianal numbness, weakness or weight loss. Risk factors include sedentary lifestyle. Patient has tried analgesics, NSAIDs, muscle relaxant and heat for the symptoms. The treatment provided mild relief.   Neck Pain    This is a chronic problem. The current episode started more than 1 year ago. The problem occurs constantly. The problem has been unchanged. The pain is present in the midline. The quality of the pain is described as aching and shooting. The pain is at a severity of 8/10. The pain is severe. The symptoms are aggravated by twisting, position and bending. The pain is same all the time. Stiffness is present in the morning, all day and at night. Associated symptoms include tingling. Pertinent negatives include no chest pain, fever, headaches, leg pain, numbness, pain with swallowing, paresis, syncope, trouble swallowing, visual change, weakness or weight loss. He has tried acetaminophen, muscle relaxants and NSAIDs for the symptoms. The treatment provided mild relief.     Review of Systems   Constitutional: Positive for fatigue.   HENT: Negative for congestion, postnasal drip, rhinorrhea, sinus pressure and sore throat.    Respiratory: Negative for choking and shortness of breath.    Cardiovascular: Negative.  Negative for chest pain and palpitations.   Gastrointestinal: Negative.    Musculoskeletal: Positive for arthralgias, back pain and gait problem. Negative for myalgias.   Skin: Negative.    Allergic/Immunologic: Negative for immunocompromised state.   Neurological: Negative for dizziness, tremors, seizures, syncope, weakness and numbness.   Hematological: Negative.    Psychiatric/Behavioral: Negative for agitation,  confusion, decreased concentration, dysphoric mood, sleep disturbance and suicidal ideas. The patient is not nervous/anxious.    All other systems reviewed and are negative.      Objective   Physical Exam   Constitutional: She is oriented to person, place, and time. She appears well-developed and well-nourished.   HENT:   Head: Normocephalic and atraumatic.       Eyes: Conjunctivae and EOM are normal. Pupils are equal, round, and reactive to light.   Neck: Normal range of motion. Neck supple. No JVD present. No tracheal deviation present. No thyromegaly present.   Cardiovascular: Normal rate, regular rhythm, normal heart sounds and intact distal pulses.   No murmur heard.  Pulmonary/Chest: Effort normal and breath sounds normal. She has no wheezes.   Abdominal: Soft. Bowel sounds are normal. She exhibits no distension. There is no tenderness.   Musculoskeletal: Normal range of motion. She exhibits no edema.   Lymphadenopathy:     She has no cervical adenopathy.   Neurological: She is alert and oriented to person, place, and time. Coordination normal.   Skin: Skin is warm and dry. No rash noted.   Psychiatric: She has a normal mood and affect.   Nursing note and vitals reviewed.    Assessment/Plan   Isabel was seen today for med refill.    Diagnoses and all orders for this visit:    Lumbago-sciatica due to displacement of lumbar intervertebral disc  -     MRI Lumbar Spine Without Contrast; Future    Essential hypertension  -     Comprehensive Metabolic Panel  -     TSH    Vitamin B12 deficiency  -     CBC & Differential; Future  -     Vitamin B12 & Folate    Hyperlipidemia, unspecified hyperlipidemia type  -     LDL Cholesterol, Direct; Future    Vitamin D deficiency  -     Vitamin D 25 Hydroxy    Palpitations  -     Adult Transthoracic Echo Complete W/ Cont if Necessary Per Protocol; Future    Other orders  -     pregabalin (LYRICA) 150 MG capsule; Take 1 capsule by mouth 3 (Three) Times a Day.    The patient has  read and signed the [facility] Controlled Substance Contract.  I will continue to see patient for regular follow up appointments. Patient is well controlled on the medication.  DEYVI has been reviewed by me and is updated every 3 months. The patient is aware of the potential for addiction and dependence.   Return fasting for labs as above    Continue with Lyrica and will order a new MRI of the lumbar spine for continued pain.    We will get an echocardiogram to evaluate palpitations.    Continue with vitamin D and B12 supplements and current medicines for hypertension and lipids.            This document has been electronically signed by Radha Simental MD on July 30, 2019 11:50 AM

## 2019-08-07 ENCOUNTER — LAB (OUTPATIENT)
Dept: LAB | Facility: OTHER | Age: 54
End: 2019-08-07

## 2019-08-07 DIAGNOSIS — E53.8 VITAMIN B12 DEFICIENCY: ICD-10-CM

## 2019-08-07 DIAGNOSIS — E78.5 HYPERLIPIDEMIA, UNSPECIFIED HYPERLIPIDEMIA TYPE: ICD-10-CM

## 2019-08-07 LAB
25(OH)D3 SERPL-MCNC: 36.6 NG/ML (ref 30–100)
ALBUMIN SERPL-MCNC: 4.3 G/DL (ref 3.5–5)
ALBUMIN/GLOB SERPL: 1.3 G/DL (ref 1.1–1.8)
ALP SERPL-CCNC: 81 U/L (ref 38–126)
ALT SERPL W P-5'-P-CCNC: 20 U/L
ANION GAP SERPL CALCULATED.3IONS-SCNC: 9 MMOL/L (ref 5–15)
ARTICHOKE IGE QN: 196 MG/DL (ref 0–100)
AST SERPL-CCNC: 20 U/L (ref 14–36)
BASOPHILS # BLD AUTO: 0.02 10*3/MM3 (ref 0–0.2)
BASOPHILS NFR BLD AUTO: 0.2 % (ref 0–1.5)
BILIRUB SERPL-MCNC: 0.3 MG/DL (ref 0.2–1.3)
BUN BLD-MCNC: 7 MG/DL (ref 7–23)
BUN/CREAT SERPL: 11.3 (ref 7–25)
CALCIUM SPEC-SCNC: 9.9 MG/DL (ref 8.4–10.2)
CHLORIDE SERPL-SCNC: 106 MMOL/L (ref 101–112)
CO2 SERPL-SCNC: 27 MMOL/L (ref 22–30)
CREAT BLD-MCNC: 0.62 MG/DL (ref 0.52–1.04)
DEPRECATED RDW RBC AUTO: 41.8 FL (ref 37–54)
EOSINOPHIL # BLD AUTO: 0.05 10*3/MM3 (ref 0–0.4)
EOSINOPHIL NFR BLD AUTO: 0.5 % (ref 0.3–6.2)
ERYTHROCYTE [DISTWIDTH] IN BLOOD BY AUTOMATED COUNT: 13.1 % (ref 12.3–15.4)
FOLATE SERPL-MCNC: 7.93 NG/ML (ref 4.78–24.2)
GFR SERPL CREATININE-BSD FRML MDRD: 100 ML/MIN/1.73 (ref 51–120)
GLOBULIN UR ELPH-MCNC: 3.4 GM/DL (ref 2.3–3.5)
GLUCOSE BLD-MCNC: 170 MG/DL (ref 70–99)
HCT VFR BLD AUTO: 43.2 % (ref 34–46.6)
HGB BLD-MCNC: 14.8 G/DL (ref 12–15.9)
LYMPHOCYTES # BLD AUTO: 3.04 10*3/MM3 (ref 0.7–3.1)
LYMPHOCYTES NFR BLD AUTO: 31.1 % (ref 19.6–45.3)
MCH RBC QN AUTO: 30.3 PG (ref 26.6–33)
MCHC RBC AUTO-ENTMCNC: 34.3 G/DL (ref 31.5–35.7)
MCV RBC AUTO: 88.5 FL (ref 79–97)
MONOCYTES # BLD AUTO: 0.72 10*3/MM3 (ref 0.1–0.9)
MONOCYTES NFR BLD AUTO: 7.4 % (ref 5–12)
NEUTROPHILS # BLD AUTO: 5.95 10*3/MM3 (ref 1.7–7)
NEUTROPHILS NFR BLD AUTO: 60.8 % (ref 42.7–76)
PLATELET # BLD AUTO: 337 10*3/MM3 (ref 140–450)
PMV BLD AUTO: 9.5 FL (ref 6–12)
POTASSIUM BLD-SCNC: 4.1 MMOL/L (ref 3.4–5)
PROT SERPL-MCNC: 7.7 G/DL (ref 6.3–8.6)
RBC # BLD AUTO: 4.88 10*6/MM3 (ref 3.77–5.28)
SODIUM BLD-SCNC: 142 MMOL/L (ref 137–145)
TSH SERPL DL<=0.05 MIU/L-ACNC: 3.3 MIU/ML (ref 0.27–4.2)
VIT B12 BLD-MCNC: 282 PG/ML (ref 211–946)
WBC NRBC COR # BLD: 9.78 10*3/MM3 (ref 3.4–10.8)

## 2019-08-07 PROCEDURE — 82306 VITAMIN D 25 HYDROXY: CPT | Performed by: FAMILY MEDICINE

## 2019-08-07 PROCEDURE — 82607 VITAMIN B-12: CPT | Performed by: FAMILY MEDICINE

## 2019-08-07 PROCEDURE — 36415 COLL VENOUS BLD VENIPUNCTURE: CPT | Performed by: FAMILY MEDICINE

## 2019-08-07 PROCEDURE — 85025 COMPLETE CBC W/AUTO DIFF WBC: CPT | Performed by: FAMILY MEDICINE

## 2019-08-07 PROCEDURE — 83721 ASSAY OF BLOOD LIPOPROTEIN: CPT | Performed by: FAMILY MEDICINE

## 2019-08-07 PROCEDURE — 84443 ASSAY THYROID STIM HORMONE: CPT | Performed by: FAMILY MEDICINE

## 2019-08-07 PROCEDURE — 82746 ASSAY OF FOLIC ACID SERUM: CPT | Performed by: FAMILY MEDICINE

## 2019-08-07 PROCEDURE — 80053 COMPREHEN METABOLIC PANEL: CPT | Performed by: FAMILY MEDICINE

## 2019-08-08 NOTE — PROGRESS NOTES
Notify patient test results are ok except cholesterol has gone up.  Try diet changes recheck in 6 months and if not better will need to start medication

## 2019-09-10 DIAGNOSIS — M51.27 LUMBAGO-SCIATICA DUE TO DISPLACEMENT OF LUMBAR INTERVERTEBRAL DISC: ICD-10-CM

## 2019-09-24 ENCOUNTER — PRIOR AUTHORIZATION (OUTPATIENT)
Dept: FAMILY MEDICINE CLINIC | Facility: CLINIC | Age: 54
End: 2019-09-24

## 2019-09-24 ENCOUNTER — OFFICE VISIT (OUTPATIENT)
Dept: FAMILY MEDICINE CLINIC | Facility: CLINIC | Age: 54
End: 2019-09-24

## 2019-09-24 VITALS
DIASTOLIC BLOOD PRESSURE: 86 MMHG | BODY MASS INDEX: 30.04 KG/M2 | HEIGHT: 60 IN | WEIGHT: 153 LBS | SYSTOLIC BLOOD PRESSURE: 134 MMHG

## 2019-09-24 DIAGNOSIS — E53.9 VITAMIN B DEFICIENCY: ICD-10-CM

## 2019-09-24 DIAGNOSIS — E55.9 VITAMIN D DEFICIENCY: ICD-10-CM

## 2019-09-24 DIAGNOSIS — E53.8 VITAMIN B12 DEFICIENCY: ICD-10-CM

## 2019-09-24 DIAGNOSIS — E78.5 HYPERLIPIDEMIA, UNSPECIFIED HYPERLIPIDEMIA TYPE: ICD-10-CM

## 2019-09-24 DIAGNOSIS — I10 ESSENTIAL HYPERTENSION: ICD-10-CM

## 2019-09-24 DIAGNOSIS — M51.27 LUMBAGO-SCIATICA DUE TO DISPLACEMENT OF LUMBAR INTERVERTEBRAL DISC: Primary | ICD-10-CM

## 2019-09-24 PROCEDURE — 99214 OFFICE O/P EST MOD 30 MIN: CPT | Performed by: FAMILY MEDICINE

## 2019-09-24 PROCEDURE — 96372 THER/PROPH/DIAG INJ SC/IM: CPT | Performed by: FAMILY MEDICINE

## 2019-09-24 RX ORDER — PREGABALIN 150 MG/1
150 CAPSULE ORAL 3 TIMES DAILY
Qty: 90 CAPSULE | Refills: 2 | Status: SHIPPED | OUTPATIENT
Start: 2019-09-24 | End: 2020-04-02 | Stop reason: SDUPTHER

## 2019-09-24 RX ORDER — ATOMOXETINE 25 MG/1
25 CAPSULE ORAL DAILY
Qty: 60 CAPSULE | Refills: 5 | Status: SHIPPED | OUTPATIENT
Start: 2019-09-24 | End: 2020-08-06

## 2019-09-24 RX ORDER — BENZONATATE 200 MG/1
200 CAPSULE ORAL 3 TIMES DAILY PRN
Qty: 15 CAPSULE | Refills: 2 | Status: SHIPPED | OUTPATIENT
Start: 2019-09-24 | End: 2020-02-26

## 2019-09-24 RX ORDER — AMLODIPINE BESYLATE AND BENAZEPRIL HYDROCHLORIDE 5; 20 MG/1; MG/1
1 CAPSULE ORAL DAILY
Qty: 30 CAPSULE | Refills: 5 | Status: SHIPPED | OUTPATIENT
Start: 2019-09-24 | End: 2020-04-01

## 2019-09-24 RX ORDER — ERGOCALCIFEROL 1.25 MG/1
50000 CAPSULE ORAL 2 TIMES WEEKLY
Qty: 8 CAPSULE | Refills: 5 | Status: SHIPPED | OUTPATIENT
Start: 2019-09-26 | End: 2020-04-01

## 2019-09-24 RX ORDER — OMEPRAZOLE 20 MG/1
20 CAPSULE, DELAYED RELEASE ORAL DAILY
Qty: 60 CAPSULE | Refills: 5 | Status: SHIPPED | OUTPATIENT
Start: 2019-09-24 | End: 2020-12-08 | Stop reason: SDUPTHER

## 2019-09-24 RX ORDER — ALBUTEROL SULFATE 90 UG/1
2 AEROSOL, METERED RESPIRATORY (INHALATION) EVERY 6 HOURS PRN
Qty: 1 INHALER | Refills: 3 | Status: SHIPPED | OUTPATIENT
Start: 2019-09-24 | End: 2020-12-08 | Stop reason: SDUPTHER

## 2019-09-24 RX ORDER — CYANOCOBALAMIN 1000 UG/ML
1000 INJECTION, SOLUTION INTRAMUSCULAR; SUBCUTANEOUS
Status: SHIPPED | OUTPATIENT
Start: 2019-09-24 | End: 2020-08-25

## 2019-09-24 RX ORDER — ATORVASTATIN CALCIUM 10 MG/1
10 TABLET, FILM COATED ORAL DAILY
Qty: 30 TABLET | Refills: 5 | Status: SHIPPED | OUTPATIENT
Start: 2019-09-24 | End: 2020-04-01

## 2019-09-24 RX ORDER — FLUTICASONE PROPIONATE 50 MCG
2 SPRAY, SUSPENSION (ML) NASAL DAILY
Qty: 1 BOTTLE | Refills: 5 | Status: SHIPPED | OUTPATIENT
Start: 2019-09-24 | End: 2020-04-01

## 2019-09-24 RX ORDER — TIZANIDINE 4 MG/1
4 TABLET ORAL EVERY 8 HOURS PRN
Qty: 90 TABLET | Refills: 5 | Status: SHIPPED | OUTPATIENT
Start: 2019-09-24 | End: 2020-04-01

## 2019-09-24 RX ADMIN — CYANOCOBALAMIN 1000 MCG: 1000 INJECTION, SOLUTION INTRAMUSCULAR; SUBCUTANEOUS at 09:56

## 2019-09-24 NOTE — PROGRESS NOTES
Subjective   Isabel Villalba is a 54 y.o. female who presents to the office for follow-up on low back pain radiating into the legs, hypertension, hyperlipidemia.  She has chronic low back and neck pain.  MRIs have shown degenerative changes and disc herniations.  Surgery has been discussed in the past.  She would like to see about a new MRI of the low back as she has had increasing pain and it has been been at least a couple years since it has been looked at.  If she is a candidate for intervention she would be willing to go through this.  She is having increasing pain numbness and weakness down her right leg.    We started B12 shots and gave her some energy but it ran out in a week or 2.  Her levels were very low.  We discussed taking them more often, such as every 2 weeks rather than monthly.    History of Present Illness   Hypertension   This is a chronic problem. The current episode started more than 1 year ago. The problem has been waxing and waning since onset. Associated symptoms include anxiety, headaches and malaise/fatigue. Pertinent negatives include no blurred vision, chest pain, neck pain, orthopnea, palpitations, peripheral edema, PND, shortness of breath or sweats. There are no associated agents to hypertension. Risk factors for coronary artery disease include dyslipidemia and family history. Past treatments include   The current treatment provides moderate improvement. There are no compliance problems.    Back Pain   This is a chronic problem. The current episode started more than 1 year ago. The problem occurs constantly. The problem is unchanged. The pain is present in the lumbar spine. The quality of the pain is described as shooting, stabbing and aching. The pain radiates to the right knee and left knee. The pain is at a severity of 8/10. The pain is severe. The pain is the same all the time. The symptoms are aggravated by standing, twisting, position, bending and sitting. Stiffness is  present at night, all day and in the morning. Associated symptoms include leg pain and tingling. Pertinent negatives include no abdominal pain, bladder incontinence, bowel incontinence, chest pain, dysuria, fever, headaches, numbness, paresis, paresthesias, pelvic pain, perianal numbness, weakness or weight loss. Risk factors include sedentary lifestyle. Patient has tried analgesics, NSAIDs, muscle relaxant and heat for the symptoms. The treatment provided mild relief.   Neck Pain    This is a chronic problem. The current episode started more than 1 year ago. The problem occurs constantly. The problem has been unchanged. The pain is present in the midline. The quality of the pain is described as aching and shooting. The pain is at a severity of 8/10. The pain is severe. The symptoms are aggravated by twisting, position and bending. The pain is same all the time. Stiffness is present in the morning, all day and at night. Associated symptoms include tingling. Pertinent negatives include no chest pain, fever, headaches, leg pain, numbness, pain with swallowing, paresis, syncope, trouble swallowing, visual change, weakness or weight loss. He has tried acetaminophen, muscle relaxants and NSAIDs for the symptoms. The treatment provided mild relief.     Review of Systems   Constitutional: Positive for fatigue.   HENT: Negative for congestion, postnasal drip, rhinorrhea, sinus pressure and sore throat.    Respiratory: Negative for choking and shortness of breath.    Cardiovascular: Negative.  Negative for chest pain and palpitations.   Gastrointestinal: Negative.    Musculoskeletal: Positive for arthralgias, back pain and gait problem. Negative for myalgias.   Skin: Negative.    Allergic/Immunologic: Negative for immunocompromised state.   Neurological: Negative for dizziness, tremors, seizures, syncope, weakness and numbness.   Hematological: Negative.    Psychiatric/Behavioral: Negative for agitation, confusion, decreased  concentration, dysphoric mood, sleep disturbance and suicidal ideas. The patient is not nervous/anxious.    All other systems reviewed and are negative.      Objective   Physical Exam   Constitutional: She is oriented to person, place, and time. She appears well-developed and well-nourished.   HENT:   Head: Normocephalic and atraumatic.       Eyes: Conjunctivae and EOM are normal. Pupils are equal, round, and reactive to light.   Neck: Normal range of motion. Neck supple. No JVD present. No tracheal deviation present. No thyromegaly present.   Cardiovascular: Normal rate, regular rhythm, normal heart sounds and intact distal pulses.   No murmur heard.  Pulmonary/Chest: Effort normal and breath sounds normal. She has no wheezes.   Abdominal: Soft. Bowel sounds are normal. She exhibits no distension. There is no tenderness.   Musculoskeletal: Normal range of motion. She exhibits no edema.   Lymphadenopathy:     She has no cervical adenopathy.   Neurological: She is alert and oriented to person, place, and time. Coordination normal.   Skin: Skin is warm and dry. No rash noted.   Psychiatric: She has a normal mood and affect.   Nursing note and vitals reviewed.    No visits with results within 1 Month(s) from this visit.   Latest known visit with results is:   Lab on 08/07/2019   Component Date Value Ref Range Status   • LDL Cholesterol  08/07/2019 196* 0 - 100 mg/dL Final   • WBC 08/07/2019 9.78  3.40 - 10.80 10*3/mm3 Final   • RBC 08/07/2019 4.88  3.77 - 5.28 10*6/mm3 Final   • Hemoglobin 08/07/2019 14.8  12.0 - 15.9 g/dL Final   • Hematocrit 08/07/2019 43.2  34.0 - 46.6 % Final   • MCV 08/07/2019 88.5  79.0 - 97.0 fL Final   • MCH 08/07/2019 30.3  26.6 - 33.0 pg Final   • MCHC 08/07/2019 34.3  31.5 - 35.7 g/dL Final   • RDW 08/07/2019 13.1  12.3 - 15.4 % Final   • RDW-SD 08/07/2019 41.8  37.0 - 54.0 fl Final   • MPV 08/07/2019 9.5  6.0 - 12.0 fL Final   • Platelets 08/07/2019 337  140 - 450 10*3/mm3 Final   •  Neutrophil % 08/07/2019 60.8  42.7 - 76.0 % Final   • Lymphocyte % 08/07/2019 31.1  19.6 - 45.3 % Final   • Monocyte % 08/07/2019 7.4  5.0 - 12.0 % Final   • Eosinophil % 08/07/2019 0.5  0.3 - 6.2 % Final   • Basophil % 08/07/2019 0.2  0.0 - 1.5 % Final   • Neutrophils, Absolute 08/07/2019 5.95  1.70 - 7.00 10*3/mm3 Final   • Lymphocytes, Absolute 08/07/2019 3.04  0.70 - 3.10 10*3/mm3 Final   • Monocytes, Absolute 08/07/2019 0.72  0.10 - 0.90 10*3/mm3 Final   • Eosinophils, Absolute 08/07/2019 0.05  0.00 - 0.40 10*3/mm3 Final   • Basophils, Absolute 08/07/2019 0.02  0.00 - 0.20 10*3/mm3 Final   ]  Assessment/Plan   Isabel was seen today for results.    Diagnoses and all orders for this visit:    Lumbago-sciatica due to displacement of lumbar intervertebral disc  -     Ambulatory Referral to Neurosurgery    Vitamin B deficiency  -     cyanocobalamin injection 1,000 mcg    Vitamin D deficiency    Vitamin B12 deficiency    Hyperlipidemia, unspecified hyperlipidemia type    Essential hypertension    Other orders  -     albuterol sulfate HFA (VENTOLIN HFA) 108 (90 Base) MCG/ACT inhaler; Inhale 2 puffs Every 6 (Six) Hours As Needed for Wheezing or Shortness of Air.  -     amLODIPine-benazepril (LOTREL 5-20) 5-20 MG per capsule; Take 1 capsule by mouth Daily.  -     atomoxetine (STRATTERA) 25 MG capsule; Take 1 capsule by mouth Daily.  -     atorvastatin (LIPITOR) 10 MG tablet; Take 1 tablet by mouth Daily.  -     fluticasone (FLONASE) 50 MCG/ACT nasal spray; 2 sprays into the nostril(s) as directed by provider Daily.  -     omeprazole (priLOSEC) 20 MG capsule; Take 1 capsule by mouth Daily.  -     benzonatate (TESSALON) 200 MG capsule; Take 1 capsule by mouth 3 (Three) Times a Day As Needed for Cough.  -     pregabalin (LYRICA) 150 MG capsule; Take 1 capsule by mouth 3 (Three) Times a Day.  -     tiZANidine (ZANAFLEX) 4 MG tablet; Take 1 tablet by mouth Every 8 (Eight) Hours As Needed for Muscle Spasms.  -     vitamin D  (ERGOCALCIFEROL) 98403 units capsule capsule; Take 1 capsule by mouth 2 (Two) Times a Week.    The patient has read and signed the [facility] Controlled Substance Contract.  I will continue to see patient for regular follow up appointments. Patient is well controlled on the medication.  DEYVI has been reviewed by me and is updated every 3 months. The patient is aware of the potential for addiction and dependence.     Continue with Lyrica and will order a new MRI of the lumbar spine for continued pain.    Reviewed labs as above, continue above meds for hypertension and lipids and vitamin D    Increase B12 shots to every 2 weeks    Will make referral back to neurosurgery.  Compared the MRI reports and they seem to be very similar but given that her pain is worse we will see if she is a candidate for intervention.          This document has been electronically signed by Radha Simental MD on September 24, 2019 9:08 AM

## 2019-09-24 NOTE — TELEPHONE ENCOUNTER
PA for Prolia was denied through [employer] insurance. I was in [facility] 09/24/2019 speaking with Pharmacist and was told Mrs. Vlilalba had picked up the Prolia and it went through her insurance with 0 co-pay

## 2019-10-17 ENCOUNTER — CLINICAL SUPPORT (OUTPATIENT)
Dept: FAMILY MEDICINE CLINIC | Facility: CLINIC | Age: 54
End: 2019-10-17

## 2019-10-17 DIAGNOSIS — E53.8 VITAMIN B12 DEFICIENCY: ICD-10-CM

## 2019-10-17 PROCEDURE — 96372 THER/PROPH/DIAG INJ SC/IM: CPT | Performed by: FAMILY MEDICINE

## 2019-10-17 RX ADMIN — CYANOCOBALAMIN 1000 MCG: 1000 INJECTION, SOLUTION INTRAMUSCULAR; SUBCUTANEOUS at 11:30

## 2019-11-11 ENCOUNTER — CLINICAL SUPPORT (OUTPATIENT)
Dept: FAMILY MEDICINE CLINIC | Facility: CLINIC | Age: 54
End: 2019-11-11

## 2019-11-11 DIAGNOSIS — E53.8 VITAMIN B12 DEFICIENCY: ICD-10-CM

## 2019-11-11 PROCEDURE — 96372 THER/PROPH/DIAG INJ SC/IM: CPT | Performed by: FAMILY MEDICINE

## 2019-11-11 RX ADMIN — CYANOCOBALAMIN 1000 MCG: 1000 INJECTION, SOLUTION INTRAMUSCULAR; SUBCUTANEOUS at 11:37

## 2019-12-30 RX ORDER — PREGABALIN 150 MG/1
150 CAPSULE ORAL 3 TIMES DAILY
Qty: 90 CAPSULE | Refills: 2 | OUTPATIENT
Start: 2019-12-30

## 2020-04-01 RX ORDER — ATORVASTATIN CALCIUM 10 MG/1
10 TABLET, FILM COATED ORAL DAILY
Qty: 30 TABLET | Refills: 5 | Status: SHIPPED | OUTPATIENT
Start: 2020-04-01 | End: 2020-08-06

## 2020-04-01 RX ORDER — ERGOCALCIFEROL 1.25 MG/1
50000 CAPSULE ORAL 2 TIMES WEEKLY
Qty: 8 CAPSULE | Refills: 5 | Status: SHIPPED | OUTPATIENT
Start: 2020-04-02 | End: 2020-08-06

## 2020-04-01 RX ORDER — TIZANIDINE 4 MG/1
4 TABLET ORAL EVERY 8 HOURS PRN
Qty: 90 TABLET | Refills: 5 | Status: SHIPPED | OUTPATIENT
Start: 2020-04-01 | End: 2020-08-06

## 2020-04-01 RX ORDER — AMLODIPINE BESYLATE AND BENAZEPRIL HYDROCHLORIDE 5; 20 MG/1; MG/1
1 CAPSULE ORAL DAILY
Qty: 30 CAPSULE | Refills: 5 | Status: SHIPPED | OUTPATIENT
Start: 2020-04-01 | End: 2020-08-06

## 2020-04-01 RX ORDER — FLUTICASONE PROPIONATE 50 MCG
SPRAY, SUSPENSION (ML) NASAL
Qty: 16 G | Refills: 5 | Status: SHIPPED | OUTPATIENT
Start: 2020-04-01 | End: 2020-08-06

## 2020-04-02 ENCOUNTER — TELEMEDICINE (OUTPATIENT)
Dept: FAMILY MEDICINE CLINIC | Facility: CLINIC | Age: 55
End: 2020-04-02

## 2020-04-02 VITALS — WEIGHT: 152 LBS | BODY MASS INDEX: 29.84 KG/M2 | HEIGHT: 60 IN

## 2020-04-02 DIAGNOSIS — I10 ESSENTIAL HYPERTENSION: Primary | ICD-10-CM

## 2020-04-02 DIAGNOSIS — J40 BRONCHITIS: ICD-10-CM

## 2020-04-02 DIAGNOSIS — E55.9 VITAMIN D DEFICIENCY: ICD-10-CM

## 2020-04-02 DIAGNOSIS — E53.8 VITAMIN B12 DEFICIENCY: ICD-10-CM

## 2020-04-02 DIAGNOSIS — E78.5 HYPERLIPIDEMIA, UNSPECIFIED HYPERLIPIDEMIA TYPE: ICD-10-CM

## 2020-04-02 DIAGNOSIS — M51.27 LUMBAGO-SCIATICA DUE TO DISPLACEMENT OF LUMBAR INTERVERTEBRAL DISC: ICD-10-CM

## 2020-04-02 DIAGNOSIS — M81.0 OSTEOPOROSIS WITHOUT CURRENT PATHOLOGICAL FRACTURE, UNSPECIFIED OSTEOPOROSIS TYPE: ICD-10-CM

## 2020-04-02 PROBLEM — M25.512 LEFT SHOULDER PAIN: Status: RESOLVED | Noted: 2019-04-05 | Resolved: 2020-04-02

## 2020-04-02 PROBLEM — M67.922 TENDINOPATHY OF LEFT BICEPS TENDON: Status: RESOLVED | Noted: 2019-04-05 | Resolved: 2020-04-02

## 2020-04-02 PROCEDURE — 99214 OFFICE O/P EST MOD 30 MIN: CPT | Performed by: FAMILY MEDICINE

## 2020-04-02 RX ORDER — METHYLPREDNISOLONE 4 MG/1
TABLET ORAL
Qty: 21 TABLET | Refills: 0 | Status: SHIPPED | OUTPATIENT
Start: 2020-04-02 | End: 2020-07-07

## 2020-04-02 RX ORDER — AZITHROMYCIN 250 MG/1
TABLET, FILM COATED ORAL
Qty: 6 TABLET | Refills: 0 | Status: SHIPPED | OUTPATIENT
Start: 2020-04-02 | End: 2020-04-07

## 2020-04-02 RX ORDER — BENZONATATE 200 MG/1
200 CAPSULE ORAL 3 TIMES DAILY PRN
Qty: 15 CAPSULE | Refills: 2 | Status: SHIPPED | OUTPATIENT
Start: 2020-04-02 | End: 2020-07-07

## 2020-04-02 RX ORDER — PREGABALIN 150 MG/1
150 CAPSULE ORAL 3 TIMES DAILY
Qty: 90 CAPSULE | Refills: 2 | Status: SHIPPED | OUTPATIENT
Start: 2020-04-02 | End: 2020-07-08 | Stop reason: SDUPTHER

## 2020-04-02 NOTE — PROGRESS NOTES
Subjective   Isabel Villalba is a 54 y.o. female.   Seen today by video visit  She has a couple of concerns.  She has a nonproductive cough for the last 3 or 4 weeks.  This is common for her in the spring.  She is a smoker.  She has had no fever or worsening breathing symptoms such as dyspnea.  She needs a refill of Lyrica that she takes for low back pain that radiates into the legs.  She takes Prolia for osteoporosis and wonders about this right now she usually comes to my office to get the shot.  I have advised that she wait until the quarantine was over at least a month or 2 to do this and she is fine with that.  She is due for labs for lipids, hypothyroidism, hypertension and we will have her come in since the quarantine is over.  She also has vitamin deficiencies that she is due for follow-up on  History of Present Illness  Back Pain   This is a chronic problem. The current episode started more than 1 year ago. The problem occurs constantly. The problem is unchanged. The pain is present in the lumbar spine. The quality of the pain is described as shooting, stabbing and aching. The pain radiates to the right knee and left knee. The pain is at a severity of 8/10. The pain is severe. The pain is the same all the time. The symptoms are aggravated by standing, twisting, position, bending and sitting. Stiffness is present at night, all day and in the morning. Associated symptoms include leg pain and tingling. Pertinent negatives include no abdominal pain, bladder incontinence, bowel incontinence, chest pain, dysuria, fever, headaches, numbness, paresis, paresthesias, pelvic pain, perianal numbness, weakness or weight loss. Risk factors include sedentary lifestyle. Patient has tried analgesics, NSAIDs, muscle relaxant and heat for the symptoms. The treatment provided mild relief.     The following portions of the patient's history were reviewed and updated as appropriate: allergies, current medications, past  family history, past medical history, past social history, past surgical history and problem list.    Review of Systems   Constitutional: Positive for fatigue.   HENT: Negative for congestion, postnasal drip, rhinorrhea, sinus pressure and sore throat.    Respiratory: Negative for choking and shortness of breath.    Cardiovascular: Negative.  Negative for chest pain and palpitations.   Gastrointestinal: Negative.    Musculoskeletal: Positive for arthralgias, back pain and gait problem. Negative for myalgias.   Skin: Negative.    Allergic/Immunologic: Negative for immunocompromised state.   Neurological: Negative for dizziness, tremors, seizures, syncope, weakness and numbness.   Hematological: Negative.    Psychiatric/Behavioral: Negative for agitation, confusion, decreased concentration, dysphoric mood, sleep disturbance and suicidal ideas. The patient is not nervous/anxious.    All other systems reviewed and are negative.      Objective   Physical Exam   Constitutional: She is oriented to person, place, and time. She appears well-developed and well-nourished.   HENT:   Head: Normocephalic and atraumatic.   Neurological: She is alert and oriented to person, place, and time.   Psychiatric: She has a normal mood and affect. Her behavior is normal.       Assessment/Plan   Isabel was seen today for med refill.    Diagnoses and all orders for this visit:    Essential hypertension  -     CBC & Differential; Future  -     Comprehensive Metabolic Panel    Hyperlipidemia, unspecified hyperlipidemia type  -     Lipid Panel; Future  -     T4, Free  -     TSH    Vitamin D deficiency  -     Vitamin D 25 Hydroxy    Vitamin B12 deficiency  -     Vitamin B12 & Folate    Lumbago-sciatica due to displacement of lumbar intervertebral disc  -     pregabalin (LYRICA) 150 MG capsule; Take 1 capsule by mouth 3 (Three) Times a Day.    Bronchitis  -     benzonatate (TESSALON) 200 MG capsule; Take 1 capsule by mouth 3 (Three) Times a Day As  Needed for Cough.  -     azithromycin (ZITHROMAX) 250 MG tablet; Take 2 tablets the first day, then 1 tablet daily for 4 days.  -     methylPREDNISolone (MEDROL, SCOOTER,) 4 MG tablet; Take as directed on package instructions.    Osteoporosis without current pathological fracture, unspecified osteoporosis type    She will come in for labs for vitamin deficiencies hypertension hyperlipidemia and hypothyroidism, as above once the quarantine is over    For the bronchitis symptoms gave a Z-Scooter, Medrol Dosepak, and Tessalon Perles.  Smoking cessation encouraged.    Continue Lyrica for the low back pain.    She will contact me after the quarantine is over for us to order the Prolia and she can come by to get the shot.          This document has been electronically signed by Radha Simental MD on April 2, 2020 13:06

## 2020-06-29 DIAGNOSIS — M51.27 LUMBAGO-SCIATICA DUE TO DISPLACEMENT OF LUMBAR INTERVERTEBRAL DISC: ICD-10-CM

## 2020-06-29 RX ORDER — PREGABALIN 150 MG/1
150 CAPSULE ORAL 3 TIMES DAILY
Qty: 90 CAPSULE | Refills: 2 | OUTPATIENT
Start: 2020-06-29

## 2020-07-06 ENCOUNTER — LAB (OUTPATIENT)
Dept: LAB | Facility: OTHER | Age: 55
End: 2020-07-06

## 2020-07-06 DIAGNOSIS — E78.5 HYPERLIPIDEMIA, UNSPECIFIED HYPERLIPIDEMIA TYPE: ICD-10-CM

## 2020-07-06 DIAGNOSIS — I10 ESSENTIAL HYPERTENSION: ICD-10-CM

## 2020-07-06 LAB
ALBUMIN SERPL-MCNC: 4.3 G/DL (ref 3.5–5)
ALBUMIN/GLOB SERPL: 1.1 G/DL (ref 1.1–1.8)
ALP SERPL-CCNC: 118 U/L (ref 38–126)
ALT SERPL W P-5'-P-CCNC: 23 U/L
ANION GAP SERPL CALCULATED.3IONS-SCNC: 9 MMOL/L (ref 5–15)
AST SERPL-CCNC: 29 U/L (ref 14–36)
BASOPHILS # BLD AUTO: 0.06 10*3/MM3 (ref 0–0.2)
BASOPHILS NFR BLD AUTO: 0.6 % (ref 0–1.5)
BILIRUB SERPL-MCNC: 0.5 MG/DL (ref 0.2–1.3)
BUN SERPL-MCNC: 9 MG/DL (ref 7–23)
BUN/CREAT SERPL: 16.4 (ref 7–25)
CALCIUM SPEC-SCNC: 10.3 MG/DL (ref 8.4–10.2)
CHLORIDE SERPL-SCNC: 99 MMOL/L (ref 101–112)
CHOLEST SERPL-MCNC: 280 MG/DL (ref 150–200)
CO2 SERPL-SCNC: 29 MMOL/L (ref 22–30)
CREAT SERPL-MCNC: 0.55 MG/DL (ref 0.52–1.04)
DEPRECATED RDW RBC AUTO: 40.1 FL (ref 37–54)
EOSINOPHIL # BLD AUTO: 0.04 10*3/MM3 (ref 0–0.4)
EOSINOPHIL NFR BLD AUTO: 0.4 % (ref 0.3–6.2)
ERYTHROCYTE [DISTWIDTH] IN BLOOD BY AUTOMATED COUNT: 12.9 % (ref 12.3–15.4)
GFR SERPL CREATININE-BSD FRML MDRD: 115 ML/MIN/1.73 (ref 51–120)
GLOBULIN UR ELPH-MCNC: 3.8 GM/DL (ref 2.3–3.5)
GLUCOSE SERPL-MCNC: 303 MG/DL (ref 70–99)
HCT VFR BLD AUTO: 45.5 % (ref 34–46.6)
HDLC SERPL-MCNC: 39 MG/DL (ref 40–59)
HGB BLD-MCNC: 15.5 G/DL (ref 12–15.9)
LDLC SERPL CALC-MCNC: ABNORMAL MG/DL
LDLC/HDLC SERPL: ABNORMAL {RATIO}
LYMPHOCYTES # BLD AUTO: 2.79 10*3/MM3 (ref 0.7–3.1)
LYMPHOCYTES NFR BLD AUTO: 26.2 % (ref 19.6–45.3)
MCH RBC QN AUTO: 29.4 PG (ref 26.6–33)
MCHC RBC AUTO-ENTMCNC: 34.1 G/DL (ref 31.5–35.7)
MCV RBC AUTO: 86.2 FL (ref 79–97)
MONOCYTES # BLD AUTO: 0.73 10*3/MM3 (ref 0.1–0.9)
MONOCYTES NFR BLD AUTO: 6.9 % (ref 5–12)
NEUTROPHILS NFR BLD AUTO: 65.9 % (ref 42.7–76)
NEUTROPHILS NFR BLD AUTO: 7.03 10*3/MM3 (ref 1.7–7)
PLATELET # BLD AUTO: 309 10*3/MM3 (ref 140–450)
PMV BLD AUTO: 9.7 FL (ref 6–12)
POTASSIUM SERPL-SCNC: 4.4 MMOL/L (ref 3.4–5)
PROT SERPL-MCNC: 8.1 G/DL (ref 6.3–8.6)
RBC # BLD AUTO: 5.28 10*6/MM3 (ref 3.77–5.28)
SODIUM SERPL-SCNC: 137 MMOL/L (ref 137–145)
TRIGL SERPL-MCNC: 412 MG/DL
VLDLC SERPL-MCNC: ABNORMAL MG/DL
WBC # BLD AUTO: 10.65 10*3/MM3 (ref 3.4–10.8)

## 2020-07-06 PROCEDURE — 36415 COLL VENOUS BLD VENIPUNCTURE: CPT | Performed by: FAMILY MEDICINE

## 2020-07-06 PROCEDURE — 84439 ASSAY OF FREE THYROXINE: CPT | Performed by: FAMILY MEDICINE

## 2020-07-06 PROCEDURE — 85025 COMPLETE CBC W/AUTO DIFF WBC: CPT | Performed by: FAMILY MEDICINE

## 2020-07-06 PROCEDURE — 82746 ASSAY OF FOLIC ACID SERUM: CPT | Performed by: FAMILY MEDICINE

## 2020-07-06 PROCEDURE — 80053 COMPREHEN METABOLIC PANEL: CPT | Performed by: FAMILY MEDICINE

## 2020-07-06 PROCEDURE — 84443 ASSAY THYROID STIM HORMONE: CPT | Performed by: FAMILY MEDICINE

## 2020-07-06 PROCEDURE — 82306 VITAMIN D 25 HYDROXY: CPT | Performed by: FAMILY MEDICINE

## 2020-07-06 PROCEDURE — 80061 LIPID PANEL: CPT | Performed by: FAMILY MEDICINE

## 2020-07-06 PROCEDURE — 82607 VITAMIN B-12: CPT | Performed by: FAMILY MEDICINE

## 2020-07-07 LAB
25(OH)D3 SERPL-MCNC: 39.2 NG/ML (ref 30–100)
FOLATE SERPL-MCNC: 5.67 NG/ML (ref 4.78–24.2)
T4 FREE SERPL-MCNC: 1.09 NG/DL (ref 0.93–1.7)
TSH SERPL DL<=0.05 MIU/L-ACNC: 1.09 UIU/ML (ref 0.27–4.2)
VIT B12 BLD-MCNC: 343 PG/ML (ref 211–946)

## 2020-07-08 ENCOUNTER — PRIOR AUTHORIZATION (OUTPATIENT)
Dept: FAMILY MEDICINE CLINIC | Facility: CLINIC | Age: 55
End: 2020-07-08

## 2020-07-08 ENCOUNTER — TELEMEDICINE (OUTPATIENT)
Dept: FAMILY MEDICINE CLINIC | Facility: CLINIC | Age: 55
End: 2020-07-08

## 2020-07-08 DIAGNOSIS — M51.27 LUMBAGO-SCIATICA DUE TO DISPLACEMENT OF LUMBAR INTERVERTEBRAL DISC: ICD-10-CM

## 2020-07-08 DIAGNOSIS — M81.0 OSTEOPOROSIS WITHOUT CURRENT PATHOLOGICAL FRACTURE, UNSPECIFIED OSTEOPOROSIS TYPE: ICD-10-CM

## 2020-07-08 DIAGNOSIS — E78.5 HYPERLIPIDEMIA, UNSPECIFIED HYPERLIPIDEMIA TYPE: ICD-10-CM

## 2020-07-08 DIAGNOSIS — E11.69 TYPE 2 DIABETES MELLITUS WITH OTHER SPECIFIED COMPLICATION, WITHOUT LONG-TERM CURRENT USE OF INSULIN (HCC): Primary | ICD-10-CM

## 2020-07-08 DIAGNOSIS — J40 BRONCHITIS: ICD-10-CM

## 2020-07-08 PROCEDURE — 99214 OFFICE O/P EST MOD 30 MIN: CPT | Performed by: FAMILY MEDICINE

## 2020-07-08 RX ORDER — BENZONATATE 200 MG/1
200 CAPSULE ORAL 3 TIMES DAILY PRN
Qty: 30 CAPSULE | Refills: 2 | Status: SHIPPED | OUTPATIENT
Start: 2020-07-08 | End: 2020-12-08 | Stop reason: SDUPTHER

## 2020-07-08 RX ORDER — PREGABALIN 150 MG/1
150 CAPSULE ORAL 3 TIMES DAILY
Qty: 90 CAPSULE | Refills: 2 | Status: SHIPPED | OUTPATIENT
Start: 2020-07-08 | End: 2020-12-08 | Stop reason: SDUPTHER

## 2020-07-08 NOTE — TELEPHONE ENCOUNTER
Repatha PA Case: [id number], Status: Approved, Coverage Starts on: 7/8/2020 12:00:00 AM, Coverage Ends on: 1/4/2021 12:00:00 AM. Questions? Contact [phone].

## 2020-07-08 NOTE — PROGRESS NOTES
Subjective   Isabel Villalba is a 54 y.o. female.   Seen today by video visit due to the Covid-19 quarantine.   Patient with chronic low back pain had an ablation.  She was told the pain relief could last several months but she says within 4 to 5 days her pain came back.  She is following up with the pain doctor concerning medications and changes but does need a refill of the Lyrica.  Also, she had recent labs to review.  Her cholesterol is extremely high even though she is on Lipitor.  There is a family history of cardiovascular disease and we discussed adding Repatha as I think she would be an excellent candidate for this given her risk factors and lack of response to statins.  Vitamin levels were in normal limits but her blood sugar was markedly elevated at 303.  She needs a refill of Prolia for osteoporosis    History of Present Illness  Back Pain   This is a chronic problem. The current episode started more than 1 year ago. The problem occurs constantly. The problem is unchanged. The pain is present in the lumbar spine. The quality of the pain is described as shooting, stabbing and aching. The pain radiates to the right knee and left knee. The pain is at a severity of 8/10. The pain is severe. The pain is the same all the time. The symptoms are aggravated by standing, twisting, position, bending and sitting. Stiffness is present at night, all day and in the morning. Associated symptoms include leg pain and tingling. Pertinent negatives include no abdominal pain, bladder incontinence, bowel incontinence, chest pain, dysuria, fever, headaches, numbness, paresis, paresthesias, pelvic pain, perianal numbness, weakness or weight loss. Risk factors include sedentary lifestyle. Patient has tried analgesics, NSAIDs, muscle relaxant and heat for the symptoms. The treatment provided mild relief.     The following portions of the patient's history were reviewed and updated as appropriate: allergies, current  medications, past family history, past medical history, past social history, past surgical history and problem list.    Review of Systems   Constitutional: Positive for fatigue.   HENT: Negative for congestion, postnasal drip, rhinorrhea, sinus pressure and sore throat.    Respiratory: Negative for choking and shortness of breath.    Cardiovascular: Negative.  Negative for chest pain and palpitations.   Gastrointestinal: Negative.    Musculoskeletal: Positive for arthralgias, back pain and gait problem. Negative for myalgias.   Skin: Negative.    Allergic/Immunologic: Negative for immunocompromised state.   Neurological: Negative for dizziness, tremors, seizures, syncope, weakness and numbness.   Hematological: Negative.    Psychiatric/Behavioral: Negative for agitation, confusion, decreased concentration, dysphoric mood, sleep disturbance and suicidal ideas. The patient is not nervous/anxious.    All other systems reviewed and are negative.      Objective   Physical Exam   Constitutional: She is oriented to person, place, and time. She appears well-developed and well-nourished. No distress.   HENT:   Head: Normocephalic and atraumatic.   Eyes: Pupils are equal, round, and reactive to light. EOM are normal. Right eye exhibits no discharge. Left eye exhibits no discharge.   Pulmonary/Chest: Effort normal.   Neurological: She is alert and oriented to person, place, and time.   Psychiatric: She has a normal mood and affect. Her behavior is normal. Judgment and thought content normal.     Lab on 07/06/2020   Component Date Value Ref Range Status   • Total Cholesterol 07/06/2020 280* 150 - 200 mg/dL Final   • Triglycerides 07/06/2020 412* <=150 mg/dL Final   • HDL Cholesterol 07/06/2020 39* 40 - 59 mg/dL Final   • LDL Cholesterol  07/06/2020    Final    Unable to calculate   • VLDL Cholesterol 07/06/2020    Final    Unable to calculate   • LDL/HDL Ratio 07/06/2020    Final    Unable to calculate   • WBC 07/06/2020 10.65   3.40 - 10.80 10*3/mm3 Final   • RBC 07/06/2020 5.28  3.77 - 5.28 10*6/mm3 Final   • Hemoglobin 07/06/2020 15.5  12.0 - 15.9 g/dL Final   • Hematocrit 07/06/2020 45.5  34.0 - 46.6 % Final   • MCV 07/06/2020 86.2  79.0 - 97.0 fL Final   • MCH 07/06/2020 29.4  26.6 - 33.0 pg Final   • MCHC 07/06/2020 34.1  31.5 - 35.7 g/dL Final   • RDW 07/06/2020 12.9  12.3 - 15.4 % Final   • RDW-SD 07/06/2020 40.1  37.0 - 54.0 fl Final   • MPV 07/06/2020 9.7  6.0 - 12.0 fL Final   • Platelets 07/06/2020 309  140 - 450 10*3/mm3 Final   • Neutrophil % 07/06/2020 65.9  42.7 - 76.0 % Final   • Lymphocyte % 07/06/2020 26.2  19.6 - 45.3 % Final   • Monocyte % 07/06/2020 6.9  5.0 - 12.0 % Final   • Eosinophil % 07/06/2020 0.4  0.3 - 6.2 % Final   • Basophil % 07/06/2020 0.6  0.0 - 1.5 % Final   • Neutrophils, Absolute 07/06/2020 7.03* 1.70 - 7.00 10*3/mm3 Final   • Lymphocytes, Absolute 07/06/2020 2.79  0.70 - 3.10 10*3/mm3 Final   • Monocytes, Absolute 07/06/2020 0.73  0.10 - 0.90 10*3/mm3 Final   • Eosinophils, Absolute 07/06/2020 0.04  0.00 - 0.40 10*3/mm3 Final   • Basophils, Absolute 07/06/2020 0.06  0.00 - 0.20 10*3/mm3 Final   ]  Assessment/Plan   Isabel was seen today for med refill.    Diagnoses and all orders for this visit:    Type 2 diabetes mellitus with other specified complication, without long-term current use of insulin (CMS/[facility])  -     Semaglutide,0.25 or 0.5MG/DOS, (Ozempic, 0.25 or 0.5 MG/DOSE,) 2 MG/1.5ML solution pen-injector; Inject 0.25 mg under the skin into the appropriate area as directed 1 (One) Time Per Week.    Lumbago-sciatica due to displacement of lumbar intervertebral disc  -     pregabalin (LYRICA) 150 MG capsule; Take 1 capsule by mouth 3 (Three) Times a Day.    Bronchitis  -     benzonatate (TESSALON) 200 MG capsule; Take 1 capsule by mouth 3 (Three) Times a Day As Needed for Cough.    Hyperlipidemia, unspecified hyperlipidemia type  -     Evolocumab (Repatha SureClick) 140 MG/ML solution auto-injector;  Inject 1 mL under the skin into the appropriate area as directed Every 14 (Fourteen) Days.    Osteoporosis without current pathological fracture, unspecified osteoporosis type  -     denosumab (Prolia) 60 MG/ML solution prefilled syringe syringe; Inject 1 mL under the skin into the appropriate area as directed 1 (One) Time for 1 dose.    The patient has read and signed the [facility] Controlled Substance Contract.  I will continue to see patient for regular follow up appointments. Patient is well controlled on the medication.  DEYVI has been reviewed by me and is updated every 3 months. The patient is aware of the potential for addiction and dependence.      Discussed diabetic diet compliance, will add Ozempic and recommend that she test blood sugars at least daily and when needed.  We will recheck in 1 month.    We will add Repatha along with Lipitor and monitor her lipids closely with rechecking an LDL in at least 3 months    Continue Lyrica for low back pain and follow-up with pain management    Continue Prolia for osteoporosis          This document has been electronically signed by Radha Simental MD on July 8, 2020 10:08

## 2020-07-09 ENCOUNTER — CLINICAL SUPPORT (OUTPATIENT)
Dept: FAMILY MEDICINE CLINIC | Facility: CLINIC | Age: 55
End: 2020-07-09

## 2020-07-09 DIAGNOSIS — M81.0 OSTEOPOROSIS WITHOUT CURRENT PATHOLOGICAL FRACTURE, UNSPECIFIED OSTEOPOROSIS TYPE: Primary | ICD-10-CM

## 2020-07-09 DIAGNOSIS — E11.69 TYPE 2 DIABETES MELLITUS WITH OTHER SPECIFIED COMPLICATION, WITHOUT LONG-TERM CURRENT USE OF INSULIN (HCC): Primary | ICD-10-CM

## 2020-07-09 PROCEDURE — 96372 THER/PROPH/DIAG INJ SC/IM: CPT | Performed by: FAMILY MEDICINE

## 2020-07-09 RX ORDER — BLOOD-GLUCOSE METER
1 KIT MISCELLANEOUS 2 TIMES DAILY
Qty: 1 EACH | Refills: 0 | Status: SHIPPED | OUTPATIENT
Start: 2020-07-09

## 2020-07-09 RX ADMIN — CYANOCOBALAMIN 1000 MCG: 1000 INJECTION, SOLUTION INTRAMUSCULAR; SUBCUTANEOUS at 13:45

## 2020-07-30 DIAGNOSIS — Z12.39 SCREENING FOR BREAST CANCER: Primary | ICD-10-CM

## 2020-07-30 DIAGNOSIS — Z12.31 ENCOUNTER FOR SCREENING MAMMOGRAM FOR MALIGNANT NEOPLASM OF BREAST: ICD-10-CM

## 2020-08-06 ENCOUNTER — TELEMEDICINE (OUTPATIENT)
Dept: FAMILY MEDICINE CLINIC | Facility: CLINIC | Age: 55
End: 2020-08-06

## 2020-08-06 DIAGNOSIS — M51.27 LUMBAGO-SCIATICA DUE TO DISPLACEMENT OF LUMBAR INTERVERTEBRAL DISC: ICD-10-CM

## 2020-08-06 DIAGNOSIS — E11.69 TYPE 2 DIABETES MELLITUS WITH OTHER SPECIFIED COMPLICATION, WITHOUT LONG-TERM CURRENT USE OF INSULIN (HCC): Primary | ICD-10-CM

## 2020-08-06 DIAGNOSIS — E78.5 HYPERLIPIDEMIA, UNSPECIFIED HYPERLIPIDEMIA TYPE: ICD-10-CM

## 2020-08-06 PROCEDURE — 99213 OFFICE O/P EST LOW 20 MIN: CPT | Performed by: FAMILY MEDICINE

## 2020-08-06 RX ORDER — FLUTICASONE PROPIONATE 50 MCG
SPRAY, SUSPENSION (ML) NASAL
Qty: 16 G | Refills: 5 | Status: SHIPPED | OUTPATIENT
Start: 2020-08-06 | End: 2020-12-08 | Stop reason: SDUPTHER

## 2020-08-06 RX ORDER — ERGOCALCIFEROL 1.25 MG/1
50000 CAPSULE ORAL 2 TIMES WEEKLY
Qty: 8 CAPSULE | Refills: 5 | Status: SHIPPED | OUTPATIENT
Start: 2020-08-06 | End: 2020-12-08 | Stop reason: SDUPTHER

## 2020-08-06 RX ORDER — ATOMOXETINE 25 MG/1
25 CAPSULE ORAL DAILY
Qty: 60 CAPSULE | Refills: 5 | Status: SHIPPED | OUTPATIENT
Start: 2020-08-06 | End: 2020-12-08 | Stop reason: SDUPTHER

## 2020-08-06 RX ORDER — DENOSUMAB 60 MG/ML
INJECTION SUBCUTANEOUS
COMMUNITY
Start: 2020-07-08 | End: 2020-12-31

## 2020-08-06 RX ORDER — ATORVASTATIN CALCIUM 10 MG/1
10 TABLET, FILM COATED ORAL DAILY
Qty: 30 TABLET | Refills: 5 | Status: SHIPPED | OUTPATIENT
Start: 2020-08-06 | End: 2021-03-15

## 2020-08-06 RX ORDER — TIZANIDINE 4 MG/1
4 TABLET ORAL EVERY 8 HOURS PRN
Qty: 90 TABLET | Refills: 5 | Status: SHIPPED | OUTPATIENT
Start: 2020-08-06 | End: 2020-12-08 | Stop reason: SDUPTHER

## 2020-08-06 RX ORDER — AMLODIPINE BESYLATE AND BENAZEPRIL HYDROCHLORIDE 5; 20 MG/1; MG/1
1 CAPSULE ORAL DAILY
Qty: 30 CAPSULE | Refills: 5 | Status: SHIPPED | OUTPATIENT
Start: 2020-08-06 | End: 2020-12-08 | Stop reason: SDUPTHER

## 2020-08-06 NOTE — PROGRESS NOTES
Subjective   Isabel Villalba is a 54 y.o. female.   Seen today by video visit due to the Covid-19 quarantine.  She is following up today for diabetes, hyperlipidemia, and low back pain.  We added Ozempic for diabetes and Repatha for lipids.   Her blood sugar has been running 180-220.  She is only on the 0.25 mg though.  She has tolerated it well    She is tolerating the Repatha shots well.  We will check labs again in a couple months for this.    She is having some increasing pain in her low back.  She did have some epidurals which did not provide lasting relief for her like she had hoped.  She is wanting referral to a different pain clinic, though when her  goes to his this will be easier for them.    History of Present Illness  Back Pain   This is a chronic problem. The current episode started more than 1 year ago. The problem occurs constantly. The problem is unchanged. The pain is present in the lumbar spine. The quality of the pain is described as shooting, stabbing and aching. The pain radiates to the right knee and left knee. The pain is at a severity of 8/10. The pain is severe. The pain is the same all the time. The symptoms are aggravated by standing, twisting, position, bending and sitting. Stiffness is present at night, all day and in the morning. Associated symptoms include leg pain and tingling. Pertinent negatives include no abdominal pain, bladder incontinence, bowel incontinence, chest pain, dysuria, fever, headaches, numbness, paresis, paresthesias, pelvic pain, perianal numbness, weakness or weight loss. Risk factors include sedentary lifestyle. Patient has tried analgesics, NSAIDs, muscle relaxant and heat for the symptoms. The treatment provided mild relief.     The following portions of the patient's history were reviewed and updated as appropriate: allergies, current medications, past family history, past medical history, past social history, past surgical history and problem  list.    Review of Systems   Constitutional: Positive for fatigue.   HENT: Negative for congestion, postnasal drip, rhinorrhea, sinus pressure and sore throat.    Respiratory: Negative for choking and shortness of breath.    Cardiovascular: Negative.  Negative for palpitations.   Gastrointestinal: Negative.    Musculoskeletal: Positive for arthralgias and gait problem. Negative for myalgias.   Skin: Negative.    Allergic/Immunologic: Negative for immunocompromised state.   Neurological: Negative for dizziness, tremors, seizures and syncope.   Hematological: Negative.    Psychiatric/Behavioral: Negative for agitation, confusion, decreased concentration, dysphoric mood, sleep disturbance and suicidal ideas. The patient is not nervous/anxious.    All other systems reviewed and are negative.      Objective   Physical Exam    Assessment/Plan   Isabel was seen today for back pain.    Diagnoses and all orders for this visit:    Type 2 diabetes mellitus with other specified complication, without long-term current use of insulin (CMS/[facility])  -     Semaglutide,0.25 or 0.5MG/DOS, (Ozempic, 0.25 or 0.5 MG/DOSE,) 2 MG/1.5ML solution pen-injector; Inject 0.5 mg under the skin into the appropriate area as directed 1 (One) Time Per Week.  -     Comprehensive Metabolic Panel  -     Hemoglobin A1c    Hyperlipidemia, unspecified hyperlipidemia type  -     LDL Cholesterol, Direct; Future    Lumbago-sciatica due to displacement of lumbar intervertebral disc  -     Ambulatory Referral to Pain Management    Will recheck cholesterol in 2 more months she will continue Repatha    Continue with current diabetes medications and testing of blood sugars at least daily and prn.  Encourage compliance with diabetic diet.  We will get an A1c with next labs    Will make referral to a different pain management clinic to see if they can provide some other options for her back        This document has been electronically signed by Radha Simental MD on  August 6, 2020 12:20

## 2020-10-13 ENCOUNTER — TELEMEDICINE (OUTPATIENT)
Dept: FAMILY MEDICINE CLINIC | Facility: CLINIC | Age: 55
End: 2020-10-13

## 2020-10-13 DIAGNOSIS — R55 SYNCOPE AND COLLAPSE: Primary | ICD-10-CM

## 2020-10-13 PROCEDURE — 99213 OFFICE O/P EST LOW 20 MIN: CPT | Performed by: FAMILY MEDICINE

## 2020-10-13 RX ORDER — HYDROCODONE BITARTRATE AND ACETAMINOPHEN 10; 325 MG/1; MG/1
TABLET ORAL
COMMUNITY
Start: 2020-10-09 | End: 2022-10-06 | Stop reason: ALTCHOICE

## 2020-10-13 NOTE — PROGRESS NOTES
Subjective   Isabel Villalba is a 55 y.o. female.   Seen today by video visit due to the Covid-19 quarantine.  Patient following up after an episode of syncope.  She said she was outside and got very hot last Thursday.  She walked a short distance and went in to get water and passed out for about 2 minutes.  Her 2 grandsons witnessed it and said she turned very pale but did not shake.  Afterwards she felt really tired.  She notes that before it happened she had a burning pain in her upper back between the shoulder blades.  Since then she says when she vacuums or does housework the pain comes back intensely.      Syncope  Pertinent negatives include no confusion, dizziness or palpitations.     The following portions of the patient's history were reviewed and updated as appropriate: allergies, current medications, past family history, past medical history, past social history, past surgical history and problem list.    Review of Systems   Constitutional: Positive for fatigue.   HENT: Negative for congestion, postnasal drip, rhinorrhea, sinus pressure and sore throat.    Respiratory: Negative for choking and shortness of breath.    Cardiovascular: Positive for syncope. Negative for palpitations.   Gastrointestinal: Negative.    Musculoskeletal: Positive for arthralgias and gait problem. Negative for myalgias.   Skin: Negative.    Allergic/Immunologic: Negative for immunocompromised state.   Neurological: Negative for dizziness, tremors, seizures and syncope.   Hematological: Negative.    Psychiatric/Behavioral: Negative for agitation, confusion, decreased concentration, dysphoric mood, sleep disturbance and suicidal ideas. The patient is not nervous/anxious.    All other systems reviewed and are negative.      Objective   Physical Exam   Constitutional: She is oriented to person, place, and time. She appears well-developed. No distress.   HENT:   Head: Normocephalic and atraumatic.   Eyes: Pupils are equal, round,  and reactive to light. Right eye exhibits no discharge. Left eye exhibits no discharge.   Pulmonary/Chest: Effort normal.   Neurological: She is alert and oriented to person, place, and time.   Psychiatric: Her behavior is normal. Judgment and thought content normal.       Assessment/Plan   Diagnoses and all orders for this visit:    1. Syncope and collapse (Primary)  -     Ambulatory Referral to Cardiology    Certainly there is concerned that this is some type of atypical angina with the pain between the shoulder blades occurring with exertion.  It is notable that the episode of syncope occurred after she had walked and exerted herself as well.  Will refer to cardiology and she is advised if she has another episode that she should go to ER immediately or seek emergency medical attention and she verbalizes understanding.  Has risk factors for coronary disease including family history, hypertension, hyperlipidemia, and diabetes.        This document has been electronically signed by Radha Simental MD on October 13, 2020 13:42 CDT

## 2020-10-22 ENCOUNTER — OFFICE VISIT (OUTPATIENT)
Dept: CARDIOLOGY | Facility: CLINIC | Age: 55
End: 2020-10-22

## 2020-10-22 VITALS
DIASTOLIC BLOOD PRESSURE: 80 MMHG | BODY MASS INDEX: 24.94 KG/M2 | HEART RATE: 84 BPM | HEIGHT: 60 IN | SYSTOLIC BLOOD PRESSURE: 145 MMHG | WEIGHT: 127 LBS | OXYGEN SATURATION: 97 %

## 2020-10-22 DIAGNOSIS — I10 ESSENTIAL HYPERTENSION: Primary | ICD-10-CM

## 2020-10-22 DIAGNOSIS — R55 SYNCOPE AND COLLAPSE: ICD-10-CM

## 2020-10-22 DIAGNOSIS — R07.9 CHEST PAIN, UNSPECIFIED TYPE: ICD-10-CM

## 2020-10-22 PROCEDURE — 93000 ELECTROCARDIOGRAM COMPLETE: CPT | Performed by: INTERNAL MEDICINE

## 2020-10-22 PROCEDURE — 99204 OFFICE O/P NEW MOD 45 MIN: CPT | Performed by: INTERNAL MEDICINE

## 2020-10-22 NOTE — PROGRESS NOTES
[facility]  OFFICE NOTE    Cardiovascular Medicine  Harrison Paredes M.D., RPVI         Radha Simental MD  [address] [place] [address] [place] [address]    Thank you for asking me to see Isabel Villalba for syncope.    History of Present Illness  This is a 55 y.o. female with:    1.  Hypertension  2.  Hyperlipidemia  3.  Systolic murmur  4.  COPD   5.  active tobacco user    Isabel Villalba is a 55 y.o. female who presents for consultation today.  Couple of weeks ago she had an episode when she was walking outside, she got very hot and subsequently passed out for about 2 minutes.  She was witnessed by her family member and turned pale at the time.  She was tired.  No reports of tongue biting, fecal or urinary incontinence and no seizures.  She is also been having some back pain with exertion.    Review of Systems - ROS  Constitution: Negative for weakness, weight gain and weight loss.   HENT: Negative for congestion.    Eyes: Negative for blurred vision.   Cardiovascular: As mentioned above  Respiratory: Negative for cough and hemoptysis.    Endocrine: Negative for polydipsia and polyuria.   Hematologic/Lymphatic: Negative for bleeding problem. Does not bruise/bleed easily.   Skin: Negative for flushing.   Musculoskeletal: Negative for neck pain and stiffness.   Gastrointestinal: Negative for abdominal pain, diarrhea, jaundice, melena, nausea and vomiting.   Genitourinary: Negative for dysuria and hematuria.   Neurological: Negative for dizziness, focal weakness and numbness.   Psychiatric/Behavioral: Negative for altered mental status and depression.          All other systems were reviewed and were negative.    family history includes ADD / ADHD in an other family member; Bipolar disorder in an other family member; Breast cancer in her father and paternal grandmother; Cancer in her father and another family member; Coronary artery disease in an other family member; Diabetes in  an other family member; Heart disease in an other family member; Hyperlipidemia in an other family member; Hypertension in her mother and another family member; Lung cancer in an other family member; Migraines in an other family member; Other in an other family member; Polycystic ovary syndrome in an other family member; Schizophrenia in an other family member; Stroke in an other family member; Tuberculosis in an other family member.     reports that she has been smoking cigarettes. She has been smoking about 1.00 pack per day. She has never used smokeless tobacco. She reports that she does not drink alcohol or use drugs.    Allergies   Allergen Reactions   • Morphine And Related Nausea And Vomiting         Current Outpatient Medications:   •  albuterol sulfate HFA (VENTOLIN HFA) 108 (90 Base) MCG/ACT inhaler, Inhale 2 puffs Every 6 (Six) Hours As Needed for Wheezing or Shortness of Air., Disp: 1 inhaler, Rfl: 3  •  amLODIPine-benazepril (LOTREL 5-20) 5-20 MG per capsule, TAKE 1 CAPSULE BY MOUTH DAILY., Disp: 30 capsule, Rfl: 5  •  atomoxetine (STRATTERA) 25 MG capsule, TAKE 1 CAPSULE BY MOUTH DAILY., Disp: 60 capsule, Rfl: 5  •  atorvastatin (LIPITOR) 10 MG tablet, TAKE 1 TABLET BY MOUTH DAILY., Disp: 30 tablet, Rfl: 5  •  benzonatate (TESSALON) 200 MG capsule, Take 1 capsule by mouth 3 (Three) Times a Day As Needed for Cough., Disp: 30 capsule, Rfl: 2  •  Evolocumab (Repatha SureClick) 140 MG/ML solution auto-injector, Inject 1 mL under the skin into the appropriate area as directed Every 14 (Fourteen) Days., Disp: 2 pen, Rfl: 5  •  fluticasone (FLONASE) 50 MCG/ACT nasal spray, USE 2 SPRAYS IN EACH NOSTRIL DAILY AS DIRECTED, Disp: 16 g, Rfl: 5  •  glucose blood test strip, Patient is testing 2xs daily, Disp: 100 each, Rfl: 12  •  glucose monitor monitoring kit, 1 each 2 (two) times a day. Patient is testing 2 xs daily, Disp: 1 each, Rfl: 0  •  guaiFENesin (MUCINEX) 600 MG 12 hr tablet, Take 1 tablet by mouth 2  "(Two) Times a Day., Disp: 14 tablet, Rfl: 0  •  HYDROcodone-acetaminophen (NORCO) 5-325 MG per tablet, , Disp: , Rfl:   •  Lancets Misc. misc, Patient is testing 2 xs daily, Disp: 100 each, Rfl: 11  •  omeprazole (priLOSEC) 20 MG capsule, Take 1 capsule by mouth Daily., Disp: 60 capsule, Rfl: 5  •  pregabalin (LYRICA) 150 MG capsule, Take 1 capsule by mouth 3 (Three) Times a Day., Disp: 90 capsule, Rfl: 2  •  PROLIA 60 MG/ML solution prefilled syringe syringe, , Disp: , Rfl:   •  Semaglutide,0.25 or 0.5MG/DOS, (Ozempic, 0.25 or 0.5 MG/DOSE,) 2 MG/1.5ML solution pen-injector, Inject 0.5 mg under the skin into the appropriate area as directed 1 (One) Time Per Week., Disp: 2 pen, Rfl: 5  •  tiZANidine (ZANAFLEX) 4 MG tablet, TAKE 1 TABLET BY MOUTH EVERY 8 (EIGHT) HOURS AS NEEDED FOR MUSCLE SPASMS., Disp: 90 tablet, Rfl: 5  •  vitamin D (ERGOCALCIFEROL) 1.25 MG (51356 UT) capsule capsule, TAKE 1 CAPSULE BY MOUTH 2 (TWO) TIMES A WEEK., Disp: 8 capsule, Rfl: 5    Physical Exam:  Vitals:    10/22/20 0948   BP: 145/80   BP Location: Right arm   Patient Position: Sitting   Cuff Size: Adult   Pulse: 84   SpO2: 97%   Weight: 57.6 kg (127 lb)   Height: 152.4 cm (60\")     Current Pain Level: none  Pulse Ox: Normal  on room air  General: alert, appears stated age and cooperative     Body Habitus: well-nourished    HEENT: Head: Normocephalic, no lesions, without obvious abnormality. No arcus senilis, xanthelasma or xanthomas.    Neuro: alert, oriented x3  Pulses: 2+ and symmetric  JVP: Volume/Pulsation: Normal.  Normal waveforms.   Appropriate inspiratory decrease.  No Kussmaul's. No Guanaco's.   Carotid Exam: no bruit normal pulsation bilaterally   Carotid Volume: normal.     Respirations: no increased work of breathing   Chest:  Normal    Pulmonary:Normal   Precordium: Normal impulses. P2 is not palpable.  RV Heave: absent  LV Heave: absent  [place]:  normal size and placement  Palpable S4: absent.  Heart rate: normal    Heart " Rhythm: regular     Heart Sounds: S1: normal  S2: normal  S3: absent   S4: absent  Opening Snap: absent    Pericardial Rub:  Absent: .    Abdomen:   Appearance: normal .  Palpation: Soft, non-tender to palpation, bowel sounds positive in all four quadrants; no guarding or rebound tenderness  Extremity: no edema.   LE Skin: no rashes  LE Hair:  normal  LE Pulses: well perfused with normal pulses in the distal extremities  Pallor on elevation: Absent. Rubor on dependency: None      DATA REVIEWED:     EKG. I personally reviewed and interpreted the EKG.  Sinus rhythm with sinus arrhythmia and sinus exit block.    ECG/EMG Results (all)     Procedure Component Value Units Date/Time    ECG 12 Lead [[id number]] Collected: 10/22/20 1002     Updated: 10/22/20 0958    Narrative:      Test Reason : syncope/ hypertension  Blood Pressure : **/** mmHG  Vent. Rate : 065 BPM     Atrial Rate : 065 BPM     P-R Int : 156 ms          QRS Dur : 100 ms      QT Int : 422 ms       P-R-T Axes : 045 064 064 degrees     QTc Int : 438 ms    Sinus rhythm with marked sinus arrhythmia  Otherwise normal ECG  No previous ECGs available    Referred By:             Confirmed By:         ---------------------------------------------------  TTE/RAQUEL:  Results for orders placed during the hospital encounter of 08/06/19   Adult Transthoracic Echo Complete W/ Cont if Necessary Per Protocol    Narrative · Estimated EF appears to be in the range of 56 - 60%  · Left ventricular systolic function is normal.  · Left ventricular diastolic dysfunction (grade I) consistent with   impaired relaxation.            --------------------------------------------------------------------------------------------------  LABS:     The 10-year CVD risk score (Manuelito et al., 2008) is: 52.1%    Values used to calculate the score:      Age: 55 years      Sex: Female      Diabetic: Yes      Tobacco smoker: Yes      Systolic Blood Pressure: 145 mmHg      Is BP treated: Yes       HDL Cholesterol: 39 mg/dL      Total Cholesterol: 280 mg/dL         Lab Results   Component Value Date    GLUCOSE 303 (H) 07/06/2020    BUN 9 07/06/2020    CREATININE 0.55 07/06/2020    EGFRIFNONA 115 07/06/2020    BCR 16.4 07/06/2020    K 4.4 07/06/2020    CO2 29.0 07/06/2020    CALCIUM 10.3 (H) 07/06/2020    ALBUMIN 4.30 07/06/2020    AST 29 07/06/2020    ALT 23 07/06/2020     Lab Results   Component Value Date    WBC 10.65 07/06/2020    HGB 15.5 07/06/2020    HCT 45.5 07/06/2020    MCV 86.2 07/06/2020     07/06/2020     Lab Results   Component Value Date    CHOL 280 (H) 07/06/2020    TRIG 412 (H) 07/06/2020    HDL 39 (L) 07/06/2020    LDL  07/06/2020      Comment:      Unable to calculate     Lab Results   Component Value Date    TSH 1.090 07/06/2020     No results found for: CKTOTAL, CKMB, CKMBINDEX, TROPONINI, TROPONINT  Lab Results   Component Value Date    HGBA1C 5.8 (H) 06/24/2014     No results found for: DDIMER  Lab Results   Component Value Date    ALT 23 07/06/2020     Lab Results   Component Value Date    HGBA1C 5.8 (H) 06/24/2014     Lab Results   Component Value Date    CREATININE 0.55 07/06/2020     No results found for: IRON, TIBC, FERRITIN  No results found for: INR, PROTIME    Assessment/Plan     1.  Syncope:  EKG in office today with sinus arrhythmia and potential sinus exit block.  Echocardiogram from July 2019 with EF of 56 to 60% grade 1 diastolic dysfunction.  No significant valvular disease.  Recent TSH was normal in July  Electrolytes are okay.  We will plan performing 2-week monitor to assess for any further evidence of high degree AV block as a cause for her syncope.  No carotid bruits audible.  She was also complaining of back pain with exertion, will plan performing a nuclear stress test, more sources for her chronotropic competence as well as rule out obstructive coronary artery disease  I explained the risk, benefits, alternatives and limitation of the stress test with the  patient patient is agreeable.    2.  Hypertension:  She is on combination of amlodipine/benazepril 5/20    3.  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  She has been started on Repatha.    4.  Diabetes: I be managed by primary care physician.    Prevention:  Patient's Body mass index is 24.8 kg/m². BMI is within normal parameters. No follow-up required..      Isabel Villalba  reports that she has been smoking cigarettes. She has been smoking about 1.00 pack per day. She has never used smokeless tobacco.. I have educated her on the risk of diseases from using tobacco products such as cancer, COPD and heart disease.     I advised her to quit and she is not willing to quit.    I spent 3  minutes counseling the patient.            This document has been electronically signed by Harrison Paredes MD on October 22, 2020 10:10 CDT

## 2020-10-30 ENCOUNTER — TELEPHONE (OUTPATIENT)
Dept: CARDIOLOGY | Facility: CLINIC | Age: 55
End: 2020-10-30

## 2020-10-30 NOTE — TELEPHONE ENCOUNTER
Called patient to see what was going on with her monitor, she stated that she got a text message from them that stated that it quit working to reset it.   So she did, and it has never worked since.     Advise patient to go to our [facility], and have a new on put on.     She was agreeable to that and will have it done today.         ----- Message from Mary Fontenot sent at 10/30/2020  8:18 AM CDT -----  Regarding:  PT  Pt called stating her monitor quit working 2 days ago.  She said she called when it quit working, but she had not heard anything back.  Her phone number is [phone].

## 2020-10-30 NOTE — TELEPHONE ENCOUNTER
Contacted patient and informed her that she was getting a new monitor in the mail but since [facility], just to return the one from the mail in when she turned in the other one. Patient voiced her understanding

## 2020-11-05 LAB
QT INTERVAL: 422 MS
QTC INTERVAL: 438 MS

## 2020-11-06 ENCOUNTER — LAB (OUTPATIENT)
Dept: LAB | Facility: OTHER | Age: 55
End: 2020-11-06

## 2020-11-06 DIAGNOSIS — E78.5 HYPERLIPIDEMIA, UNSPECIFIED HYPERLIPIDEMIA TYPE: ICD-10-CM

## 2020-11-06 LAB
ALBUMIN SERPL-MCNC: 4.2 G/DL (ref 3.5–5)
ALBUMIN/GLOB SERPL: 1.2 G/DL (ref 1.1–1.8)
ALP SERPL-CCNC: 63 U/L (ref 38–126)
ALT SERPL W P-5'-P-CCNC: 10 U/L
ANION GAP SERPL CALCULATED.3IONS-SCNC: 6 MMOL/L (ref 5–15)
AST SERPL-CCNC: 19 U/L (ref 14–36)
BILIRUB SERPL-MCNC: 0.4 MG/DL (ref 0.2–1.3)
BUN SERPL-MCNC: 9 MG/DL (ref 7–23)
BUN/CREAT SERPL: 12.5 (ref 7–25)
CALCIUM SPEC-SCNC: 9.8 MG/DL (ref 8.4–10.2)
CHLORIDE SERPL-SCNC: 104 MMOL/L (ref 101–112)
CO2 SERPL-SCNC: 29 MMOL/L (ref 22–30)
CREAT SERPL-MCNC: 0.72 MG/DL (ref 0.52–1.04)
GFR SERPL CREATININE-BSD FRML MDRD: 84 ML/MIN/1.73 (ref 51–120)
GLOBULIN UR ELPH-MCNC: 3.6 GM/DL (ref 2.3–3.5)
GLUCOSE SERPL-MCNC: 120 MG/DL (ref 70–99)
POTASSIUM SERPL-SCNC: 4.2 MMOL/L (ref 3.4–5)
PROT SERPL-MCNC: 7.8 G/DL (ref 6.3–8.6)
SODIUM SERPL-SCNC: 139 MMOL/L (ref 137–145)

## 2020-11-06 PROCEDURE — 83036 HEMOGLOBIN GLYCOSYLATED A1C: CPT | Performed by: FAMILY MEDICINE

## 2020-11-06 PROCEDURE — 80053 COMPREHEN METABOLIC PANEL: CPT | Performed by: FAMILY MEDICINE

## 2020-11-06 PROCEDURE — 36415 COLL VENOUS BLD VENIPUNCTURE: CPT | Performed by: FAMILY MEDICINE

## 2020-11-06 PROCEDURE — 83721 ASSAY OF BLOOD LIPOPROTEIN: CPT | Performed by: FAMILY MEDICINE

## 2020-11-07 LAB
ARTICHOKE IGE QN: 102 MG/DL (ref 0–100)
HBA1C MFR BLD: 6.72 % (ref 4.8–5.6)

## 2020-11-12 ENCOUNTER — HOSPITAL ENCOUNTER (OUTPATIENT)
Dept: CARDIOLOGY | Facility: HOSPITAL | Age: 55
Discharge: HOME | End: 2020-11-12

## 2020-11-12 ENCOUNTER — HOSPITAL ENCOUNTER (OUTPATIENT)
Dept: NUCLEAR MEDICINE | Facility: HOSPITAL | Age: 55
Discharge: HOME | End: 2020-11-12

## 2020-11-12 PROCEDURE — 78452 HT MUSCLE IMAGE SPECT MULT: CPT

## 2020-11-12 PROCEDURE — 0 TECHNETIUM SESTAMIBI: Performed by: INTERNAL MEDICINE

## 2020-11-12 PROCEDURE — 78452 HT MUSCLE IMAGE SPECT MULT: CPT | Performed by: INTERNAL MEDICINE

## 2020-11-12 PROCEDURE — A9500 TC99M SESTAMIBI: HCPCS | Performed by: INTERNAL MEDICINE

## 2020-11-12 PROCEDURE — 93016 CV STRESS TEST SUPVJ ONLY: CPT | Performed by: INTERNAL MEDICINE

## 2020-11-12 PROCEDURE — 93017 CV STRESS TEST TRACING ONLY: CPT

## 2020-11-12 PROCEDURE — 93018 CV STRESS TEST I&R ONLY: CPT | Performed by: INTERNAL MEDICINE

## 2020-11-12 RX ADMIN — TECHNETIUM TC 99M SESTAMIBI 1 DOSE: 1 INJECTION INTRAVENOUS at 08:21

## 2020-11-12 RX ADMIN — TECHNETIUM TC 99M SESTAMIBI 1 DOSE: 1 INJECTION INTRAVENOUS at 09:44

## 2020-11-13 LAB
BH CV STRESS BP STAGE 1: NORMAL
BH CV STRESS BP STAGE 2: NORMAL
BH CV STRESS DURATION MIN STAGE 1: 3
BH CV STRESS DURATION MIN STAGE 2: 2
BH CV STRESS DURATION SEC STAGE 1: 0
BH CV STRESS DURATION SEC STAGE 2: 48
BH CV STRESS GRADE STAGE 1: 10
BH CV STRESS GRADE STAGE 2: 12
BH CV STRESS HR STAGE 1: 130
BH CV STRESS HR STAGE 2: 139
BH CV STRESS METS STAGE 1: 5
BH CV STRESS METS STAGE 2: 7.5
BH CV STRESS PROTOCOL 1: NORMAL
BH CV STRESS RECOVERY BP: NORMAL MMHG
BH CV STRESS RECOVERY HR: 86 BPM
BH CV STRESS SPEED STAGE 1: 1.7
BH CV STRESS SPEED STAGE 2: 2.5
BH CV STRESS STAGE 1: 1
BH CV STRESS STAGE 2: 2
LV EF NUC BP: 70 %
MAXIMAL PREDICTED HEART RATE: 164 BPM
PERCENT MAX PREDICTED HR: 85.98 %
STRESS BASELINE BP: NORMAL MMHG
STRESS BASELINE HR: 72 BPM
STRESS PERCENT HR: 101 %
STRESS POST ESTIMATED WORKLOAD: 7 METS
STRESS POST EXERCISE DUR MIN: 5 MIN
STRESS POST EXERCISE DUR SEC: 48 SEC
STRESS POST PEAK BP: NORMAL MMHG
STRESS POST PEAK HR: 141 BPM
STRESS TARGET HR: 139 BPM

## 2020-11-16 ENCOUNTER — TELEPHONE (OUTPATIENT)
Dept: CARDIOLOGY | Facility: CLINIC | Age: 55
End: 2020-11-16

## 2020-11-20 DIAGNOSIS — E78.5 HYPERLIPIDEMIA, UNSPECIFIED HYPERLIPIDEMIA TYPE: ICD-10-CM

## 2020-11-23 RX ORDER — EVOLOCUMAB 140 MG/ML
INJECTION, SOLUTION SUBCUTANEOUS
Qty: 2 ML | Refills: 5 | Status: SHIPPED | OUTPATIENT
Start: 2020-11-23 | End: 2021-04-05 | Stop reason: SDUPTHER

## 2020-12-08 ENCOUNTER — OFFICE VISIT (OUTPATIENT)
Dept: FAMILY MEDICINE CLINIC | Facility: CLINIC | Age: 55
End: 2020-12-08

## 2020-12-08 DIAGNOSIS — M51.27 LUMBAGO-SCIATICA DUE TO DISPLACEMENT OF LUMBAR INTERVERTEBRAL DISC: ICD-10-CM

## 2020-12-08 DIAGNOSIS — E78.5 HYPERLIPIDEMIA, UNSPECIFIED HYPERLIPIDEMIA TYPE: ICD-10-CM

## 2020-12-08 DIAGNOSIS — E11.69 TYPE 2 DIABETES MELLITUS WITH OTHER SPECIFIED COMPLICATION, WITHOUT LONG-TERM CURRENT USE OF INSULIN (HCC): Primary | ICD-10-CM

## 2020-12-08 DIAGNOSIS — I10 ESSENTIAL HYPERTENSION: ICD-10-CM

## 2020-12-08 DIAGNOSIS — R05.9 COUGH: ICD-10-CM

## 2020-12-08 DIAGNOSIS — R41.840 ATTENTION DEFICIT: ICD-10-CM

## 2020-12-08 DIAGNOSIS — R06.2 WHEEZING: ICD-10-CM

## 2020-12-08 DIAGNOSIS — M18.12 DEGENERATIVE ARTHRITIS OF THUMB, LEFT: ICD-10-CM

## 2020-12-08 DIAGNOSIS — E55.9 VITAMIN D DEFICIENCY: ICD-10-CM

## 2020-12-08 DIAGNOSIS — K21.9 GASTROESOPHAGEAL REFLUX DISEASE WITHOUT ESOPHAGITIS: ICD-10-CM

## 2020-12-08 PROCEDURE — 99443 PR PHYS/QHP TELEPHONE EVALUATION 21-30 MIN: CPT | Performed by: FAMILY MEDICINE

## 2020-12-08 RX ORDER — ATOMOXETINE 25 MG/1
25 CAPSULE ORAL DAILY
Qty: 60 CAPSULE | Refills: 5 | Status: SHIPPED | OUTPATIENT
Start: 2020-12-08 | End: 2021-04-05 | Stop reason: SDUPTHER

## 2020-12-08 RX ORDER — ALBUTEROL SULFATE 90 UG/1
2 AEROSOL, METERED RESPIRATORY (INHALATION) EVERY 6 HOURS PRN
Qty: 18 G | Refills: 5 | Status: SHIPPED | OUTPATIENT
Start: 2020-12-08 | End: 2021-04-05 | Stop reason: SDUPTHER

## 2020-12-08 RX ORDER — SEMAGLUTIDE 1.34 MG/ML
0.5 INJECTION, SOLUTION SUBCUTANEOUS WEEKLY
Qty: 2 PEN | Refills: 5 | Status: SHIPPED | OUTPATIENT
Start: 2020-12-08 | End: 2021-04-05 | Stop reason: SDUPTHER

## 2020-12-08 RX ORDER — PREGABALIN 150 MG/1
150 CAPSULE ORAL 3 TIMES DAILY
Qty: 90 CAPSULE | Refills: 2 | Status: SHIPPED | OUTPATIENT
Start: 2020-12-08 | End: 2021-03-31

## 2020-12-08 RX ORDER — ERGOCALCIFEROL 1.25 MG/1
50000 CAPSULE ORAL 2 TIMES WEEKLY
Qty: 8 CAPSULE | Refills: 5 | Status: SHIPPED | OUTPATIENT
Start: 2020-12-10 | End: 2021-04-05 | Stop reason: SDUPTHER

## 2020-12-08 RX ORDER — IBUPROFEN 600 MG/1
600 TABLET ORAL EVERY 8 HOURS PRN
Qty: 60 TABLET | Refills: 0 | Status: SHIPPED | OUTPATIENT
Start: 2020-12-08 | End: 2021-04-05 | Stop reason: SDUPTHER

## 2020-12-08 RX ORDER — BENZONATATE 200 MG/1
200 CAPSULE ORAL 3 TIMES DAILY PRN
Qty: 30 CAPSULE | Refills: 2 | Status: SHIPPED | OUTPATIENT
Start: 2020-12-08 | End: 2021-04-05 | Stop reason: SDUPTHER

## 2020-12-08 RX ORDER — TIZANIDINE 4 MG/1
4 TABLET ORAL EVERY 8 HOURS PRN
Qty: 90 TABLET | Refills: 5 | Status: SHIPPED | OUTPATIENT
Start: 2020-12-08 | End: 2021-04-05 | Stop reason: SDUPTHER

## 2020-12-08 RX ORDER — FLUTICASONE PROPIONATE 50 MCG
2 SPRAY, SUSPENSION (ML) NASAL DAILY
Qty: 16 G | Refills: 5 | Status: SHIPPED | OUTPATIENT
Start: 2020-12-08 | End: 2021-04-05 | Stop reason: SDUPTHER

## 2020-12-08 RX ORDER — AMLODIPINE BESYLATE AND BENAZEPRIL HYDROCHLORIDE 5; 20 MG/1; MG/1
1 CAPSULE ORAL DAILY
Qty: 30 CAPSULE | Refills: 5 | Status: SHIPPED | OUTPATIENT
Start: 2020-12-08 | End: 2021-04-05 | Stop reason: SDUPTHER

## 2020-12-08 RX ORDER — OMEPRAZOLE 20 MG/1
20 CAPSULE, DELAYED RELEASE ORAL DAILY
Qty: 60 CAPSULE | Refills: 5 | Status: SHIPPED | OUTPATIENT
Start: 2020-12-08 | End: 2021-04-05 | Stop reason: SDUPTHER

## 2020-12-08 NOTE — PROGRESS NOTES
Subjective   Isabel Villalba is a 55 y.o. female.   This visit has been rescheduled as a phone visit to comply with patient safety concerns in accordance with CDC recommendations. Total time of discussion was 21 minutes.    You have chosen to receive care through a telephone visit. Do you consent to use a telephone visit for your medical care today? Yes  Patient following up on new-onset diabetes.  Her blood sugars run around 100 fasting usually.   She is tolerating the Ozempic well.   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  She was scheduled with orthopedics but did not feel well the day of the appointment and did not go.  She says it does bother her but she is not sure if she is ready to have an injection into the joint just yet and prefers to wait on another referral back to Ortho.    She is having a lot of pain in the right shoulder and limits her use of the right arm.  Even with the pain meds she is on the pain is still quite severe.    She is on Lipitor and her blood test shows that the LDL has decreased from 1 96-1 02 which is a great improvement.    Needs refills of Lyrica for low back pain.  Also needs refills of Strattera for adult ADD, Prilosec for reflux, vitamin D supplements, Lotrel for hypertension, and albuterol for COPD.    History of Present Illness  The following portions of the patient's history were reviewed and updated as appropriate: allergies, current medications, past family history, past medical history, past social history, past surgical history and problem list.    Review of Systems   Constitutional: Positive for fatigue.   HENT: Negative for congestion, postnasal drip, rhinorrhea, sinus pressure and sore throat.    Respiratory: Negative for choking and shortness of breath.    Gastrointestinal: Negative.    Musculoskeletal: Positive for arthralgias and gait problem. Negative for myalgias.   Skin: Negative.    Allergic/Immunologic: Negative for immunocompromised state.    Neurological: Negative for tremors, seizures and syncope.   Hematological: Negative.    Psychiatric/Behavioral: Negative for agitation, decreased concentration, dysphoric mood, sleep disturbance and suicidal ideas. The patient is not nervous/anxious.    All other systems reviewed and are negative.      Objective   Study Result    Procedure:  Right    wrist     Indication:  Right wrist pain        Technique:  Three-view examination     Prior relevant exam:  None     Sclerotic focus distal radius near the radial styloid probably  incidental benign bone island.     Radio lucent lesion in the proximal pole carpal navicular either  bone cyst or erosive changes secondary to an inflammatory  synovitis. Smaller more ill-defined radiolucencies are noted in  the carpal lunate and carpal triquetrum. This also suggests an  inflammatory synovitis.     No fracture. No acute changes are present.     IMPRESSION:  As above.     Electronically signed by:  Barrett Hayden MD  11/24/2020 10:50 AM  [facility] Workstation: [id number]       Assessment/Plan   Diagnoses and all orders for this visit:    1. Type 2 diabetes mellitus with other specified complication, without long-term current use of insulin (CMS/[facility]) (Primary)  -     Semaglutide,0.25 or 0.5MG/DOS, (Ozempic, 0.25 or 0.5 MG/DOSE,) 2 MG/1.5ML solution pen-injector; Inject 0.5 mg under the skin into the appropriate area as directed 1 (One) Time Per Week.  Dispense: 2 pen; Refill: 5    2. Lumbago-sciatica due to displacement of lumbar intervertebral disc  -     ibuprofen (ADVIL,MOTRIN) 600 MG tablet; Take 1 tablet by mouth Every 8 (Eight) Hours As Needed for Moderate Pain .  Dispense: 60 tablet; Refill: 0  -     tiZANidine (ZANAFLEX) 4 MG tablet; Take 1 tablet by mouth Every 8 (Eight) Hours As Needed for Muscle Spasms.  Dispense: 90 tablet; Refill: 5  -     pregabalin (LYRICA) 150 MG capsule; Take 1 capsule by mouth 3 (Three) Times a Day.  Dispense: 90 capsule; Refill: 2    3.  Hyperlipidemia, unspecified hyperlipidemia type    4. Vitamin D deficiency  -     vitamin D (ERGOCALCIFEROL) 1.25 MG (49688 UT) capsule capsule; Take 1 capsule by mouth 2 (Two) Times a Week.  Dispense: 8 capsule; Refill: 5    5. Essential hypertension  -     amLODIPine-benazepril (LOTREL 5-20) 5-20 MG per capsule; Take 1 capsule by mouth Daily.  Dispense: 30 capsule; Refill: 5    6. Wheezing  -     albuterol sulfate HFA (Ventolin HFA) 108 (90 Base) MCG/ACT inhaler; Inhale 2 puffs Every 6 (Six) Hours As Needed for Wheezing or Shortness of Air.  Dispense: 18 g; Refill: 5    7. Attention deficit  -     atomoxetine (STRATTERA) 25 MG capsule; Take 1 capsule by mouth Daily.  Dispense: 60 capsule; Refill: 5    8. Cough  -     benzonatate (TESSALON) 200 MG capsule; Take 1 capsule by mouth 3 (Three) Times a Day As Needed for Cough.  Dispense: 30 capsule; Refill: 2    9. Gastroesophageal reflux disease without esophagitis  -     omeprazole (priLOSEC) 20 MG capsule; Take 1 capsule by mouth Daily.  Dispense: 60 capsule; Refill: 5    10. Degenerative arthritis of thumb, left    Other orders  -     fluticasone (FLONASE) 50 MCG/ACT nasal spray; 2 sprays by Each Nare route Daily.  Dispense: 16 g; Refill: 5    Continue with current diabetes medications and testing of blood sugars at least daily and prn.  Encourage compliance with diabetic diet.   We will check another A1c in 3 to 6 months    Continue Lipitor for hyperlipidemia and recheck 6 months    Continue Prilosec for reflux    Continue Strattera for attention deficit issues    Continue Tessalon Perles were needed for cough and albuterol for wheezing, likely related to COPD though not formally diagnosed.    Will make referral to orthopedics when she is ready to see about it for her thumb and in the meantime continue with ibuprofen    Continue with ibuprofen, Lyrica, Zanaflex for low back pain    Continue vitamin D supplements.    Continue current regimen for hypertension.    The  patient has read and signed the [facility] Controlled Substance Contract.  I will continue to see patient for regular follow up appointments. Patient is well controlled on the medication.  DEYVI has been reviewed by me and is updated every 3 months. The patient is aware of the potential for addiction and dependence.           This document has been electronically signed by Radha Simental MD on December 8, 2020 11:47 CST

## 2020-12-28 DIAGNOSIS — M25.519 SHOULDER PAIN, UNSPECIFIED CHRONICITY, UNSPECIFIED LATERALITY: Primary | ICD-10-CM

## 2020-12-29 DIAGNOSIS — M25.511 RIGHT SHOULDER PAIN, UNSPECIFIED CHRONICITY: Primary | ICD-10-CM

## 2020-12-31 ENCOUNTER — OFFICE VISIT (OUTPATIENT)
Dept: CARDIOLOGY | Facility: CLINIC | Age: 55
End: 2020-12-31

## 2020-12-31 VITALS
SYSTOLIC BLOOD PRESSURE: 118 MMHG | DIASTOLIC BLOOD PRESSURE: 62 MMHG | OXYGEN SATURATION: 98 % | HEIGHT: 60 IN | WEIGHT: 125.4 LBS | HEART RATE: 79 BPM | BODY MASS INDEX: 24.62 KG/M2

## 2020-12-31 DIAGNOSIS — E78.2 HYPERLIPEMIA, MIXED: ICD-10-CM

## 2020-12-31 DIAGNOSIS — I10 HYPERTENSION, ESSENTIAL: Primary | ICD-10-CM

## 2020-12-31 PROCEDURE — 99214 OFFICE O/P EST MOD 30 MIN: CPT | Performed by: INTERNAL MEDICINE

## 2020-12-31 RX ORDER — DENOSUMAB 60 MG/ML
INJECTION SUBCUTANEOUS
Qty: 1 ML | Refills: 0 | Status: SHIPPED | OUTPATIENT
Start: 2020-12-31 | End: 2021-04-05 | Stop reason: SDUPTHER

## 2020-12-31 NOTE — PROGRESS NOTES
[facility]  OFFICE NOTE    Cardiovascular Medicine  Harrison Paredes M.D., RPVI         No referring provider defined for this encounter.    Thank you for asking me to see Isabel Villalba for syncope.    History of Present Illness  This is a 55 y.o. female with:    1.  Hypertension  2.  Hyperlipidemia  3.  Systolic murmur  4.  COPD   5.  active tobacco user    Isabel Vlilalba is a 55 y.o. female who presents for consultation today.  Couple of weeks ago she had an episode when she was walking outside, she got very hot and subsequently passed out for about 2 minutes.  She was witnessed by her family member and turned pale at the time.  She was tired.  No reports of tongue biting, fecal or urinary incontinence and no seizures.  She is also been having some back pain with exertion.    12/31/2020:  No acute issues since last visit.  No further episodes of syncope.  9    Review of Systems - ROS  Constitution: Negative for weakness, weight gain and weight loss.   HENT: Negative for congestion.    Eyes: Negative for blurred vision.   Cardiovascular: As mentioned above  Respiratory: Negative for cough and hemoptysis.    Endocrine: Negative for polydipsia and polyuria.   Hematologic/Lymphatic: Negative for bleeding problem. Does not bruise/bleed easily.   Skin: Negative for flushing.   Musculoskeletal: Negative for neck pain and stiffness.   Gastrointestinal: Negative for abdominal pain, diarrhea, jaundice, melena, nausea and vomiting.   Genitourinary: Negative for dysuria and hematuria.   Neurological: Negative for dizziness, focal weakness and numbness.   Psychiatric/Behavioral: Negative for altered mental status and depression.          All other systems were reviewed and were negative.    family history includes ADD / ADHD in an other family member; Bipolar disorder in an other family member; Breast cancer in her father and paternal grandmother; Cancer in her father and another family  member; Coronary artery disease in an other family member; Diabetes in an other family member; Heart disease in an other family member; Hyperlipidemia in an other family member; Hypertension in her mother and another family member; Lung cancer in an other family member; Migraines in an other family member; Other in an other family member; Polycystic ovary syndrome in an other family member; Schizophrenia in an other family member; Stroke in an other family member; Tuberculosis in an other family member.     reports that she has been smoking cigarettes. She has been smoking about 0.50 packs per day. She has never used smokeless tobacco. She reports that she does not drink alcohol or use drugs.    Allergies   Allergen Reactions   • Morphine And Related Nausea And Vomiting         Current Outpatient Medications:   •  albuterol sulfate HFA (Ventolin HFA) 108 (90 Base) MCG/ACT inhaler, Inhale 2 puffs Every 6 (Six) Hours As Needed for Wheezing or Shortness of Air., Disp: 18 g, Rfl: 5  •  amLODIPine-benazepril (LOTREL 5-20) 5-20 MG per capsule, Take 1 capsule by mouth Daily., Disp: 30 capsule, Rfl: 5  •  atomoxetine (STRATTERA) 25 MG capsule, Take 1 capsule by mouth Daily., Disp: 60 capsule, Rfl: 5  •  atorvastatin (LIPITOR) 10 MG tablet, TAKE 1 TABLET BY MOUTH DAILY., Disp: 30 tablet, Rfl: 5  •  benzonatate (TESSALON) 200 MG capsule, Take 1 capsule by mouth 3 (Three) Times a Day As Needed for Cough., Disp: 30 capsule, Rfl: 2  •  fluticasone (FLONASE) 50 MCG/ACT nasal spray, 2 sprays by Each Nare route Daily., Disp: 16 g, Rfl: 5  •  glucose blood test strip, Patient is testing 2xs daily, Disp: 100 each, Rfl: 12  •  glucose monitor monitoring kit, 1 each 2 (two) times a day. Patient is testing 2 xs daily, Disp: 1 each, Rfl: 0  •  guaiFENesin (MUCINEX) 600 MG 12 hr tablet, Take 1 tablet by mouth 2 (Two) Times a Day., Disp: 14 tablet, Rfl: 0  •  HYDROcodone-acetaminophen (NORCO) 5-325 MG per tablet, , Disp: , Rfl:   •   "ibuprofen (ADVIL,MOTRIN) 600 MG tablet, Take 1 tablet by mouth Every 8 (Eight) Hours As Needed for Moderate Pain ., Disp: 60 tablet, Rfl: 0  •  Lancets Misc. misc, Patient is testing 2 xs daily, Disp: 100 each, Rfl: 11  •  omeprazole (priLOSEC) 20 MG capsule, Take 1 capsule by mouth Daily., Disp: 60 capsule, Rfl: 5  •  pregabalin (LYRICA) 150 MG capsule, Take 1 capsule by mouth 3 (Three) Times a Day., Disp: 90 capsule, Rfl: 2  •  PROLIA 60 MG/ML solution prefilled syringe syringe, , Disp: , Rfl:   •  Repatha SureClick solution auto-injector SureClick injection, INJECT 1 ML UNDER THE SKIN INTO THE APPROPRIATE AREA AS DIRECTED EVERY 14 (FOURTEEN) DAYS., Disp: 2 mL, Rfl: 5  •  Semaglutide,0.25 or 0.5MG/DOS, (Ozempic, 0.25 or 0.5 MG/DOSE,) 2 MG/1.5ML solution pen-injector, Inject 0.5 mg under the skin into the appropriate area as directed 1 (One) Time Per Week., Disp: 2 pen, Rfl: 5  •  tiZANidine (ZANAFLEX) 4 MG tablet, Take 1 tablet by mouth Every 8 (Eight) Hours As Needed for Muscle Spasms., Disp: 90 tablet, Rfl: 5  •  vitamin D (ERGOCALCIFEROL) 1.25 MG (81018 UT) capsule capsule, Take 1 capsule by mouth 2 (Two) Times a Week., Disp: 8 capsule, Rfl: 5    Physical Exam:  Vitals:    12/31/20 1044   BP: 118/62   Pulse: 79   SpO2: 98%   Weight: 56.9 kg (125 lb 6.4 oz)   Height: 152.4 cm (60\")   PainSc: 0-No pain     Current Pain Level: none  Pulse Ox: Normal  on room air  General: alert, appears stated age and cooperative     Body Habitus: well-nourished    HEENT: Head: Normocephalic, no lesions, without obvious abnormality. No arcus senilis, xanthelasma or xanthomas.    Neuro: alert, oriented x3  Pulses: 2+ and symmetric  JVP: Volume/Pulsation: Normal.  Normal waveforms.   Appropriate inspiratory decrease.  No Kussmaul's. No Guanaco's.   Carotid Exam: no bruit normal pulsation bilaterally   Carotid Volume: normal.     Respirations: no increased work of breathing   Chest:  Normal    Pulmonary:Normal   Precordium: Normal " impulses. P2 is not palpable.  RV Heave: absent  LV Heave: absent  Crowley:  normal size and placement  Palpable S4: absent.  Heart rate: normal    Heart Rhythm: regular     Heart Sounds: S1: normal  S2: normal  S3: absent   S4: absent  Opening Snap: absent    Pericardial Rub:  Absent: .    Abdomen:   Appearance: normal .  Palpation: Soft, non-tender to palpation, bowel sounds positive in all four quadrants; no guarding or rebound tenderness  Extremity: no edema.   LE Skin: no rashes  LE Hair:  normal  LE Pulses: well perfused with normal pulses in the distal extremities  Pallor on elevation: Absent. Rubor on dependency: None      DATA REVIEWED:     EKG. I personally reviewed and interpreted the EKG.  Sinus rhythm with sinus arrhythmia and sinus exit block.    ECG/EMG Results (all)     Procedure Component Value Units Date/Time    ECG 12 Lead [[id number]] Collected: 10/22/20 1002     Updated: 10/22/20 0958    Narrative:      Test Reason : syncope/ hypertension  Blood Pressure : **/** mmHG  Vent. Rate : 065 BPM     Atrial Rate : 065 BPM     P-R Int : 156 ms          QRS Dur : 100 ms      QT Int : 422 ms       P-R-T Axes : 045 064 064 degrees     QTc Int : 438 ms    Sinus rhythm with marked sinus arrhythmia  Otherwise normal ECG  No previous ECGs available    Referred By:             Confirmed By:         ---------------------------------------------------  TTE/RAQUEL:  Results for orders placed during the hospital encounter of 08/06/19   Adult Transthoracic Echo Complete W/ Cont if Necessary Per Protocol    Narrative · Estimated EF appears to be in the range of 56 - 60%  · Left ventricular systolic function is normal.  · Left ventricular diastolic dysfunction (grade I) consistent with   impaired relaxation.            --------------------------------------------------------------------------------------------------  LABS:     The 10-year CVD risk score (D'Agostino, et al., 2008) is: 52.1%    Values used to calculate the  score:      Age: 55 years      Sex: Female      Diabetic: Yes      Tobacco smoker: Yes      Systolic Blood Pressure: 145 mmHg      Is BP treated: Yes      HDL Cholesterol: 39 mg/dL      Total Cholesterol: 280 mg/dL         Lab Results   Component Value Date    GLUCOSE 120 (H) 11/06/2020    BUN 9 11/06/2020    CREATININE 0.72 11/06/2020    EGFRIFNONA 84 11/06/2020    BCR 12.5 11/06/2020    K 4.2 11/06/2020    CO2 29.0 11/06/2020    CALCIUM 9.8 11/06/2020    ALBUMIN 4.20 11/06/2020    AST 19 11/06/2020    ALT 10 11/06/2020     Lab Results   Component Value Date    WBC 10.65 07/06/2020    HGB 15.5 07/06/2020    HCT 45.5 07/06/2020    MCV 86.2 07/06/2020     07/06/2020     Lab Results   Component Value Date    CHOL 280 (H) 07/06/2020    TRIG 412 (H) 07/06/2020    HDL 39 (L) 07/06/2020     (H) 11/06/2020     Lab Results   Component Value Date    TSH 1.090 07/06/2020     No results found for: CKTOTAL, CKMB, CKMBINDEX, TROPONINI, TROPONINT  Lab Results   Component Value Date    HGBA1C 6.72 (H) 11/06/2020     No results found for: DDIMER  Lab Results   Component Value Date    ALT 10 11/06/2020     Lab Results   Component Value Date    HGBA1C 6.72 (H) 11/06/2020    HGBA1C 5.8 (H) 06/24/2014     Lab Results   Component Value Date    CREATININE 0.72 11/06/2020     No results found for: IRON, TIBC, FERRITIN  No results found for: INR, PROTIME    Assessment/Plan     1.  Syncope:  EKG in office today with sinus arrhythmia and potential sinus exit block.  Echocardiogram from July 2019 with EF of 56 to 60% grade 1 diastolic dysfunction.  No significant valvular disease.  Recent TSH was normal in July  Electrolytes are okay.  Heart monitor showed intermittent sinus exit block mostly at night due to high vagal tone.  However this will not cause her syncope.  Currently her symptoms have resolved.  If patient were to have recurrent syncopal episode will consider loop recorder versus referral to EP for an EP study and  consideration for pacemaker.  No carotid bruits audible.  She was also complaining of back pain with exertion, Nuc stress was without any evidence of ischemia    2.  Hypertension:  She is on combination of amlodipine/benazepril 5/20    3.  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  She has been started on Repatha.    4.  Diabetes: I be managed by primary care physician.    Prevention:  Patient's Body mass index is 24.49 kg/m². BMI is within normal parameters. No follow-up required..      Isabel Villalba  reports that she has been smoking cigarettes. She has been smoking about 0.50 packs per day. She has never used smokeless tobacco.. I have educated her on the risk of diseases from using tobacco products such as cancer, COPD and heart disease.     I advised her to quit and she is not willing to quit.    I spent 3  minutes counseling the patient.            This document has been electronically signed by Harrison Paredes MD on December 31, 2020 11:03 CST

## 2021-01-05 DIAGNOSIS — M25.511 RIGHT SHOULDER PAIN, UNSPECIFIED CHRONICITY: Primary | ICD-10-CM

## 2021-01-11 ENCOUNTER — HOSPITAL ENCOUNTER (OUTPATIENT)
Dept: MRI IMAGING | Facility: HOSPITAL | Age: 56
Discharge: HOME | End: 2021-01-11
Admitting: FAMILY MEDICINE

## 2021-01-11 DIAGNOSIS — M25.511 RIGHT SHOULDER PAIN, UNSPECIFIED CHRONICITY: ICD-10-CM

## 2021-01-11 PROCEDURE — 73221 MRI JOINT UPR EXTREM W/O DYE: CPT

## 2021-01-12 DIAGNOSIS — M75.101 UNSP ROTATR-CUFF TEAR/RUPTR OF RIGHT SHOULDER, NOT TRAUMA: ICD-10-CM

## 2021-01-12 DIAGNOSIS — M25.511 RIGHT SHOULDER PAIN, UNSPECIFIED CHRONICITY: Primary | ICD-10-CM

## 2021-01-12 NOTE — PROGRESS NOTES
Please call the patient regarding her abnormal result.  She has rotator cuff tears and inflammation and strain of the rotator cuff tendons.  She has some fluid in the shoulder.  Needs to see surgery.  Where would she like to go and I will send the referral?

## 2021-01-13 ENCOUNTER — CLINICAL SUPPORT (OUTPATIENT)
Dept: FAMILY MEDICINE CLINIC | Facility: CLINIC | Age: 56
End: 2021-01-13

## 2021-01-13 DIAGNOSIS — M81.0 OSTEOPOROSIS WITHOUT CURRENT PATHOLOGICAL FRACTURE, UNSPECIFIED OSTEOPOROSIS TYPE: ICD-10-CM

## 2021-01-13 PROCEDURE — 96372 THER/PROPH/DIAG INJ SC/IM: CPT | Performed by: FAMILY MEDICINE

## 2021-01-21 ENCOUNTER — OFFICE VISIT (OUTPATIENT)
Dept: ORTHOPEDIC SURGERY | Facility: CLINIC | Age: 56
End: 2021-01-21

## 2021-01-21 VITALS — WEIGHT: 130 LBS | HEIGHT: 60 IN | BODY MASS INDEX: 25.52 KG/M2

## 2021-01-21 DIAGNOSIS — M25.511 ACUTE PAIN OF RIGHT SHOULDER: Primary | ICD-10-CM

## 2021-01-21 DIAGNOSIS — M75.51 SUBACROMIAL BURSITIS OF RIGHT SHOULDER JOINT: ICD-10-CM

## 2021-01-21 PROCEDURE — 99213 OFFICE O/P EST LOW 20 MIN: CPT | Performed by: ORTHOPAEDIC SURGERY

## 2021-01-21 PROCEDURE — 20610 DRAIN/INJ JOINT/BURSA W/O US: CPT | Performed by: ORTHOPAEDIC SURGERY

## 2021-01-21 RX ORDER — BUPIVACAINE HYDROCHLORIDE 5 MG/ML
3 INJECTION, SOLUTION PERINEURAL
Status: COMPLETED | OUTPATIENT
Start: 2021-01-21 | End: 2021-01-21

## 2021-01-21 RX ORDER — LIDOCAINE HYDROCHLORIDE AND EPINEPHRINE 10; 10 MG/ML; UG/ML
2 INJECTION, SOLUTION INFILTRATION; PERINEURAL
Status: COMPLETED | OUTPATIENT
Start: 2021-01-21 | End: 2021-01-21

## 2021-01-21 RX ORDER — TRIAMCINOLONE ACETONIDE 40 MG/ML
80 INJECTION, SUSPENSION INTRA-ARTICULAR; INTRAMUSCULAR
Status: COMPLETED | OUTPATIENT
Start: 2021-01-21 | End: 2021-01-21

## 2021-01-21 RX ADMIN — TRIAMCINOLONE ACETONIDE 80 MG: 40 INJECTION, SUSPENSION INTRA-ARTICULAR; INTRAMUSCULAR at 14:00

## 2021-01-21 RX ADMIN — LIDOCAINE HYDROCHLORIDE AND EPINEPHRINE 2 ML: 10; 10 INJECTION, SOLUTION INFILTRATION; PERINEURAL at 14:00

## 2021-01-21 RX ADMIN — BUPIVACAINE HYDROCHLORIDE 3 ML: 5 INJECTION, SOLUTION PERINEURAL at 14:00

## 2021-01-21 NOTE — PROGRESS NOTES
Isabel Villalba is a 55 y.o. female   Primary provider:  Radah Simental MD       Chief Complaint   Patient presents with   • Right Shoulder - Initial Evaluation       HISTORY OF PRESENT ILLNESS:  Patient in for initial evaluation of right shoulder pain.  Pain 9/10    This is the first office visit for evaluation of right shoulder pain.    Mrs. Villalba is 55 years old and right-hand dominant.  She has a 3-month history of pain in the right shoulder.  There has been no trauma and she denies any previous problems with the shoulder.  The pain is at the apex of the shoulder with occasional radiation into proximal arm.  The pain is worse with elevation and abduction and also worse at night.  There is been no specific relieving factor.  Treatment has included activity restriction.    Home medications include albuterol amlodipine Strattera Lipitor Flonase hydrocodone Prilosec Lyrica Prolia Zanaflex and Ozempic among others.  She is allergic to morphine.  She smokes 1/2 pack/day of cigarettes.  Past medical history is remarkable for hypertension reflux and chronic back pain.  She is  and does not work outside her home.    Dr. Simental has asked that I see her for evaluation and treatment.      Shoulder Injury   The right shoulder is affected. Incident onset: 3 months ago. There was no injury mechanism. The quality of the pain is described as aching. The pain does not radiate. The pain is severe. Exacerbated by: sitting, standing, driving, walking/running. She has tried nothing for the symptoms.        CONCURRENT MEDICAL HISTORY:    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   • Epigastric pain    • Essential hypertension    • Fatigue    • Gastritis    • Gastroesophageal reflux disease    • Gouty arthropathy     left first MTP joint   • H/O screening mammography 06/24/2014   • Hallux limitus of right foot    •  Hernia of abdominal wall    • Hyperlipidemia    • Irreducible incisional hernia     vs lipoma   • Lumbago with sciatica    • Lumbago-sciatica due to displacement of lumbar intervertebral disc    • Protrusion of cervical intervertebral disc     Broad based intervertebral disc protrusion - C5-6, minimally impressing upon ventral thecal sac by MRI done 4/7/15    • Pruritic rash    • Systolic murmur    • Vitamin B12 deficiency    • Weight increase        Allergies   Allergen Reactions   • Morphine And Related Nausea And Vomiting         Current Outpatient Medications:   •  albuterol sulfate HFA (Ventolin HFA) 108 (90 Base) MCG/ACT inhaler, Inhale 2 puffs Every 6 (Six) Hours As Needed for Wheezing or Shortness of Air., Disp: 18 g, Rfl: 5  •  amLODIPine-benazepril (LOTREL 5-20) 5-20 MG per capsule, Take 1 capsule by mouth Daily., Disp: 30 capsule, Rfl: 5  •  atomoxetine (STRATTERA) 25 MG capsule, Take 1 capsule by mouth Daily., Disp: 60 capsule, Rfl: 5  •  atorvastatin (LIPITOR) 10 MG tablet, TAKE 1 TABLET BY MOUTH DAILY., Disp: 30 tablet, Rfl: 5  •  benzonatate (TESSALON) 200 MG capsule, Take 1 capsule by mouth 3 (Three) Times a Day As Needed for Cough., Disp: 30 capsule, Rfl: 2  •  fluticasone (FLONASE) 50 MCG/ACT nasal spray, 2 sprays by Each Nare route Daily., Disp: 16 g, Rfl: 5  •  glucose blood test strip, Patient is testing 2xs daily, Disp: 100 each, Rfl: 12  •  glucose monitor monitoring kit, 1 each 2 (two) times a day. Patient is testing 2 xs daily, Disp: 1 each, Rfl: 0  •  guaiFENesin (MUCINEX) 600 MG 12 hr tablet, Take 1 tablet by mouth 2 (Two) Times a Day., Disp: 14 tablet, Rfl: 0  •  HYDROcodone-acetaminophen (NORCO) 5-325 MG per tablet, , Disp: , Rfl:   •  ibuprofen (ADVIL,MOTRIN) 600 MG tablet, Take 1 tablet by mouth Every 8 (Eight) Hours As Needed for Moderate Pain ., Disp: 60 tablet, Rfl: 0  •  Lancets Misc. misc, Patient is testing 2 xs daily, Disp: 100 each, Rfl: 11  •  omeprazole (priLOSEC) 20 MG  capsule, Take 1 capsule by mouth Daily., Disp: 60 capsule, Rfl: 5  •  pregabalin (LYRICA) 150 MG capsule, Take 1 capsule by mouth 3 (Three) Times a Day., Disp: 90 capsule, Rfl: 2  •  Prolia 60 MG/ML solution prefilled syringe syringe, INJECT 1 ML UNDER THE SKIN INTO THE APPROPRIATE AREA AS DIRECTED 1 (ONE) TIME FOR 1 DOSE., Disp: 1 mL, Rfl: 0  •  Repatha SureClick solution auto-injector SureClick injection, INJECT 1 ML UNDER THE SKIN INTO THE APPROPRIATE AREA AS DIRECTED EVERY 14 (FOURTEEN) DAYS., Disp: 2 mL, Rfl: 5  •  Semaglutide,0.25 or 0.5MG/DOS, (Ozempic, 0.25 or 0.5 MG/DOSE,) 2 MG/1.5ML solution pen-injector, Inject 0.5 mg under the skin into the appropriate area as directed 1 (One) Time Per Week., Disp: 2 pen, Rfl: 5  •  tiZANidine (ZANAFLEX) 4 MG tablet, Take 1 tablet by mouth Every 8 (Eight) Hours As Needed for Muscle Spasms., Disp: 90 tablet, Rfl: 5  •  vitamin D (ERGOCALCIFEROL) 1.25 MG (56812 UT) capsule capsule, Take 1 capsule by mouth 2 (Two) Times a Week., Disp: 8 capsule, Rfl: 5    Past Surgical History:   Procedure Laterality Date   • BREAST BIOPSY Left    • CARPAL TUNNEL RELEASE Bilateral 1995   • CHOLECYSTECTOMY  2004   • INJECTION OF MEDICATION  11/17/2015    Bicillin LA 1.2   • INJECTION OF MEDICATION  11/17/2015    depo medrol   • TUBAL ABDOMINAL LIGATION  2004       Family History   Problem Relation Age of Onset   • ADD / ADHD Other    • Bipolar disorder Other    • Cancer Other    • Coronary artery disease Other    • Diabetes Other    • Heart disease Other    • Hyperlipidemia Other    • Hypertension Other    • Lung cancer Other    • Migraines Other    • Polycystic ovary syndrome Other    • Schizophrenia Other    • Stroke Other    • Tuberculosis Other    • Other Other         respiratory disorder; smoking tobacco   • Breast cancer Paternal Grandmother    • Hypertension Mother    • Cancer Father    • Breast cancer Father         Social History     Socioeconomic History   • Marital status:  "     Spouse name: Not on file   • Number of children: Not on file   • Years of education: Not on file   • Highest education level: Not on file   Tobacco Use   • Smoking status: Current Every Day Smoker     Packs/day: 0.50     Types: Cigarettes   • Smokeless tobacco: Never Used   Substance and Sexual Activity   • Alcohol use: No   • Drug use: No   • Sexual activity: Defer        Review of Systems  Review of systems is positive as noted above.    PHYSICAL EXAMINATION:       Vitals:    01/21/21 1327   Weight: 59 kg (130 lb)   Height: 152.4 cm (60\")   PainSc:   9       Physical Exam she is alert pleasant and in apparent mild discomfort at rest.  She responds appropriately to questions and commands.    GAIT:     []  Normal  []  Antalgic    Assistive device: [x]  None  []  Walker     []  Crutches  []  Cane     []  Wheelchair  []  Stretcher    Ortho Exam is directed to the upper extremities.  Active elevation of the right shoulder is smooth but painfully restricted to about 95 degrees.  Active assisted elevation is to about 110 degrees.  She has a full external rotation symmetrically.  There is mild tenderness all along the right acromioclavicular joint.  There is no periscapular tenderness.  Impingement testing produces moderate discomfort.  There is mild weakness of both internal and external rotation with complaints of pain.  Radial pulses strong.  Sensory exam is intact to soft touch.    Pain radiographs of the shoulder done in December 2020 show the acromiohumeral space is normal.  There is mild degenerative change of the acromioclavicular joint as well as a small spur of the humeral head.  The MRI scan done recently shows a glenohumeral effusion as well as fluid in the subacromial bursa.  There is mild atrophy of the supraspinatus.  I see no definite rotator cuff tear.      Xr Shoulder 2+ View Right    Result Date: 12/28/2020  Narrative: EXAM DESCRIPTION: RIGHT SHOULDER THREE VIEWS CLINICAL HISTORY: Right " shoulder pain for three months. No known injury. COMPARISON: None. FINDINGS: Frontal internal/external rotation and modified glenoid views are obtained. The alignment and mineralization are within limits of normal. The articular surfaces are smooth. No fracture, dislocation, or suspicious osseous lesion. There is mild degenerative changes of the glenohumeral and AC joints. No abnormal widening of the acromioclavicular joint or the coracoclavicular space. Included upper lung zone is clear.     Impression: Negative for acute skeletal abnormality. Mild degenerative change. Electronically signed by:  Pop Rivera MD  12/28/2020 6:14 PM [facility] [[email or web address]] Workstation: [id number]    Mri Shoulder Right Without Contrast    Result Date: 1/11/2021  Narrative: EXAM: MR SHOULDER WITHOUT IV CONTRAST COMPARISONS: Radiograph 12/28/2020 INDICATION: shoulder pain, M25.511 Pain in right shoulder TECHNIQUE: Multiplanar, multisequence MR images were obtained of the right shoulder without the use of intravenous contrast. FINDINGS: Acromioclavicular Joint and Coracoclavicular Interval: Mild hypertrophy of and edema within the acromioclavicular joint. Minimal mass effect on the subjacent supraspinatus muscle belly. Acromioclavicular and coracoclavicular ligaments are intact. No subacromial enthesophyte. Subacromial/subdeltoid bursa is within normal limits. Rotator Cuff: Somewhat irregular partial-thickness (50-70%) bursal sided tear of the supraspinatus tendon, involving primarily anterior fibers, and extending approximately 1.5 cm in length. Infraspinatus is grossly intact with mild thickening and intermediate intrasubstance signal. There are insertional tears of the infraspinatus not breaching an articular surface. Teres minor is intact. Subscapularis is intact with mild thickening of an intermediate signal within the superior fibers. Musculature is normal in bulk and signal. Biceps Tendon and Glenoid: Biceps tendon is grossly intact and within the  bicipital groove. There is intermediate signal seen throughout the horizontal/intra-articular segment of the biceps labrum which extends to the intact biceps labral anchor. There is a small nondisplaced tear of the anterior superior labrum with slight posterior extension extending from the approximately 1:00 axis posteriorly to the 11:30 axis. There are subchondral cystic degenerative changes of the superior osseous glenoid. Osseous Structures and Cartilage: Bone marrow signal is within normal limits. Mild thinning of the glenohumeral articular cartilage. Miscellaneous: There is a moderate joint effusion. No mass. Visualized portions of the lung are unremarkable.     Impression: SLAP tear. Partial-thickness tear of the supraspinatus tendon on background of tendinopathy. Insertional tears of the infraspinatus tendon on background tendinopathy. Supraspinatus and biceps tendinopathy without julianne tear. Right glenohumeral osteoarthritis and chondromalacia with moderate joint effusion. Mild acromioclavicular joint osteoarthritis with minimal mass effect on the subjacent supraspinatus muscle. Electronically signed by:  Peyton Braxton MD  1/11/2021 3:26 PM [facility] Workstation: [id number]          ASSESSMENT: Right shoulder pain.  Her exam is most consistent with rotator cuff tendinitis.  The natural history of the disorder and treatment options were reviewed.  She was given Thera-Band and instructed in strengthening exercises for the shoulder rotators.  We also discussed symptomatic care.  Potential benefits of injection were discussed.  She wished to proceed with this.    The right shoulder was prepped.  Skin was infiltrated with 1% Xylocaine with epinephrine.  The subacromial bursa was injected with a mixture of Marcaine Kenalog and Xylocaine with epinephrine.  There were no complications.  After the injection she reported significant improvement in pain with shoulder motion.    Return here in 1 month if her symptoms  have not significantly improved.    Diagnoses and all orders for this visit:    Acute pain of right shoulder  -     Large Joint Arthrocentesis: R subacromial bursa    Subacromial bursitis of right shoulder joint      Large Joint Arthrocentesis: R subacromial bursa  Date/Time: 1/21/2021 2:00 PM  Consent given by: patient  Site marked: site marked  Timeout: Immediately prior to procedure a time out was called to verify the correct patient, procedure, equipment, support staff and site/side marked as required   Supporting Documentation  Indications: pain   Procedure Details  Location: shoulder - R subacromial bursa  Preparation: Patient was prepped and draped in the usual sterile fashion  Needle size: 22 G  Medications administered: 80 mg triamcinolone acetonide 40 MG/ML; 3 mL bupivacaine 0.5 %; 2 mL lidocaine-EPINEPHrine 1 %-1:[id number]  Patient tolerance: patient tolerated the procedure well with no immediate complications          PLAN    Return in about 4 weeks (around 2/18/2021).    Jeancarlos Lerma MD  Answers for HPI/ROS submitted by the patient on 1/19/2021   What is the primary reason for your visit?: Other  Please describe your symptoms.: Right shoulder pain  Have you had these symptoms before?: No  How long have you been having these symptoms?: Greater than 2 weeks  Please list any medications you are currently taking for this condition.: None for this promble

## 2021-02-24 ENCOUNTER — OFFICE VISIT (OUTPATIENT)
Dept: ORTHOPEDIC SURGERY | Facility: CLINIC | Age: 56
End: 2021-02-24

## 2021-02-24 VITALS
HEIGHT: 60 IN | HEART RATE: 89 BPM | OXYGEN SATURATION: 94 % | TEMPERATURE: 98 F | WEIGHT: 128 LBS | BODY MASS INDEX: 25.13 KG/M2

## 2021-02-24 DIAGNOSIS — M75.51 SUBACROMIAL BURSITIS OF RIGHT SHOULDER JOINT: ICD-10-CM

## 2021-02-24 DIAGNOSIS — M25.511 ACUTE PAIN OF RIGHT SHOULDER: Primary | ICD-10-CM

## 2021-02-24 PROCEDURE — 99213 OFFICE O/P EST LOW 20 MIN: CPT | Performed by: ORTHOPAEDIC SURGERY

## 2021-02-24 NOTE — PROGRESS NOTES
"Isabel Villalba is a 55 y.o. female returns for     Chief Complaint   Patient presents with   • Right Shoulder - Follow-up       HISTORY OF PRESENT ILLNESS:  F/u right shoulder, steroid injection done on 1/21/2021 helped with pain.      said that the subacromial injection she had 1 month ago resulted in about 50% relief of her pain.  She says she has been doing her home exercise.     CONCURRENT MEDICAL HISTORY:    The following portions of the patient's history were reviewed and updated as appropriate: allergies, current medications, past family history, past medical history, past social history, past surgical history and problem list.         PHYSICAL EXAMINATION:       Pulse 89   Temp 98 °F (36.7 °C)   Ht 152.4 cm (60\")   Wt 58.1 kg (128 lb)   SpO2 94%   BMI 25.00 kg/m²     Physical Exam she is alert and in no apparent distress.  GAIT:     []  Normal  []  Antalgic    Assistive device: [x]  None  []  Walker     []  Crutches  []  Cane     []  Wheelchair  []  Stretcher    Ortho Exam she lacks about 10 degrees of full active elevation of the right shoulder.  Strength of abduction and rotation of the shoulder are each mildly decreased with complaints of pain.  Impingement testing produces mild discomfort.      No results found.          ASSESSMENT: Right shoulder pain probably secondary to rotator cuff tendinitis.    She question whether another injection would be helpful.  I think it is too early to consider repeat injection.  He was instructed in use of anti-inflammatory medicines and was encouraged to continue her home exercise.    Return here in 6 weeks if her symptoms have not improved.  Diagnoses and all orders for this visit:    Acute pain of right shoulder    Subacromial bursitis of right shoulder joint          PLAN    Return in about 6 weeks (around 4/7/2021).    Jeancarlos Lerma MD  "

## 2021-03-15 RX ORDER — ATORVASTATIN CALCIUM 10 MG/1
10 TABLET, FILM COATED ORAL DAILY
Qty: 90 TABLET | Refills: 5 | Status: SHIPPED | OUTPATIENT
Start: 2021-03-15 | End: 2021-04-05 | Stop reason: SDUPTHER

## 2021-03-31 DIAGNOSIS — M51.27 LUMBAGO-SCIATICA DUE TO DISPLACEMENT OF LUMBAR INTERVERTEBRAL DISC: ICD-10-CM

## 2021-03-31 RX ORDER — PREGABALIN 150 MG/1
150 CAPSULE ORAL 3 TIMES DAILY
Qty: 90 CAPSULE | Refills: 2 | Status: SHIPPED | OUTPATIENT
Start: 2021-03-31 | End: 2021-06-28

## 2021-04-05 ENCOUNTER — OFFICE VISIT (OUTPATIENT)
Dept: FAMILY MEDICINE CLINIC | Facility: CLINIC | Age: 56
End: 2021-04-05

## 2021-04-05 VITALS — BODY MASS INDEX: 25.13 KG/M2 | HEIGHT: 60 IN | WEIGHT: 128 LBS

## 2021-04-05 DIAGNOSIS — K21.9 GASTROESOPHAGEAL REFLUX DISEASE WITHOUT ESOPHAGITIS: ICD-10-CM

## 2021-04-05 DIAGNOSIS — E11.69 TYPE 2 DIABETES MELLITUS WITH OTHER SPECIFIED COMPLICATION, WITHOUT LONG-TERM CURRENT USE OF INSULIN (HCC): ICD-10-CM

## 2021-04-05 DIAGNOSIS — E55.9 VITAMIN D DEFICIENCY: ICD-10-CM

## 2021-04-05 DIAGNOSIS — R06.2 WHEEZING: ICD-10-CM

## 2021-04-05 DIAGNOSIS — R41.840 ATTENTION DEFICIT: ICD-10-CM

## 2021-04-05 DIAGNOSIS — E78.5 HYPERLIPIDEMIA, UNSPECIFIED HYPERLIPIDEMIA TYPE: ICD-10-CM

## 2021-04-05 DIAGNOSIS — R05.9 COUGH: ICD-10-CM

## 2021-04-05 DIAGNOSIS — M51.27 LUMBAGO-SCIATICA DUE TO DISPLACEMENT OF LUMBAR INTERVERTEBRAL DISC: ICD-10-CM

## 2021-04-05 DIAGNOSIS — I10 ESSENTIAL HYPERTENSION: ICD-10-CM

## 2021-04-05 DIAGNOSIS — Z00.00 MEDICARE ANNUAL WELLNESS VISIT, SUBSEQUENT: Primary | ICD-10-CM

## 2021-04-05 DIAGNOSIS — E53.8 LOW SERUM VITAMIN B12: ICD-10-CM

## 2021-04-05 PROCEDURE — 96372 THER/PROPH/DIAG INJ SC/IM: CPT | Performed by: FAMILY MEDICINE

## 2021-04-05 PROCEDURE — 96160 PT-FOCUSED HLTH RISK ASSMT: CPT | Performed by: FAMILY MEDICINE

## 2021-04-05 PROCEDURE — G0439 PPPS, SUBSEQ VISIT: HCPCS | Performed by: FAMILY MEDICINE

## 2021-04-05 PROCEDURE — 1170F FXNL STATUS ASSESSED: CPT | Performed by: FAMILY MEDICINE

## 2021-04-05 PROCEDURE — 1159F MED LIST DOCD IN RCRD: CPT | Performed by: FAMILY MEDICINE

## 2021-04-05 RX ORDER — TIZANIDINE 4 MG/1
4 TABLET ORAL EVERY 8 HOURS PRN
Qty: 90 TABLET | Refills: 5 | Status: SHIPPED | OUTPATIENT
Start: 2021-04-05 | End: 2022-03-18

## 2021-04-05 RX ORDER — ALBUTEROL SULFATE 90 UG/1
2 AEROSOL, METERED RESPIRATORY (INHALATION) EVERY 6 HOURS PRN
Qty: 18 G | Refills: 5 | Status: SHIPPED | OUTPATIENT
Start: 2021-04-05 | End: 2021-12-08 | Stop reason: SDUPTHER

## 2021-04-05 RX ORDER — ATOMOXETINE 25 MG/1
25 CAPSULE ORAL DAILY
Qty: 60 CAPSULE | Refills: 5 | Status: SHIPPED | OUTPATIENT
Start: 2021-04-05 | End: 2022-06-13

## 2021-04-05 RX ORDER — SEMAGLUTIDE 1.34 MG/ML
0.5 INJECTION, SOLUTION SUBCUTANEOUS WEEKLY
Qty: 2 PEN | Refills: 5 | Status: SHIPPED | OUTPATIENT
Start: 2021-04-05 | End: 2022-04-18

## 2021-04-05 RX ORDER — CYANOCOBALAMIN 1000 UG/ML
1000 INJECTION, SOLUTION INTRAMUSCULAR; SUBCUTANEOUS ONCE
Status: COMPLETED | OUTPATIENT
Start: 2021-04-05 | End: 2021-04-05

## 2021-04-05 RX ORDER — IBUPROFEN 600 MG/1
600 TABLET ORAL EVERY 8 HOURS PRN
Qty: 60 TABLET | Refills: 0 | Status: SHIPPED | OUTPATIENT
Start: 2021-04-05 | End: 2021-07-01

## 2021-04-05 RX ORDER — OMEPRAZOLE 20 MG/1
20 CAPSULE, DELAYED RELEASE ORAL DAILY
Qty: 60 CAPSULE | Refills: 5 | Status: SHIPPED | OUTPATIENT
Start: 2021-04-05 | End: 2022-03-23

## 2021-04-05 RX ORDER — FLUTICASONE PROPIONATE 50 MCG
2 SPRAY, SUSPENSION (ML) NASAL DAILY
Qty: 16 G | Refills: 5 | Status: SHIPPED | OUTPATIENT
Start: 2021-04-05 | End: 2022-06-30 | Stop reason: SDUPTHER

## 2021-04-05 RX ORDER — ERGOCALCIFEROL 1.25 MG/1
50000 CAPSULE ORAL 2 TIMES WEEKLY
Qty: 8 CAPSULE | Refills: 5 | Status: SHIPPED | OUTPATIENT
Start: 2021-04-05 | End: 2022-06-30 | Stop reason: SDUPTHER

## 2021-04-05 RX ORDER — EVOLOCUMAB 140 MG/ML
140 INJECTION, SOLUTION SUBCUTANEOUS
Qty: 2 ML | Refills: 5 | Status: SHIPPED | OUTPATIENT
Start: 2021-04-05 | End: 2021-12-21

## 2021-04-05 RX ORDER — DENOSUMAB 60 MG/ML
60 INJECTION SUBCUTANEOUS
Qty: 1 ML | Refills: 1 | Status: SHIPPED | OUTPATIENT
Start: 2021-04-05 | End: 2021-12-01 | Stop reason: SDUPTHER

## 2021-04-05 RX ORDER — AMLODIPINE BESYLATE AND BENAZEPRIL HYDROCHLORIDE 5; 20 MG/1; MG/1
1 CAPSULE ORAL DAILY
Qty: 30 CAPSULE | Refills: 5 | Status: SHIPPED | OUTPATIENT
Start: 2021-04-05 | End: 2022-03-18

## 2021-04-05 RX ORDER — ATORVASTATIN CALCIUM 10 MG/1
10 TABLET, FILM COATED ORAL DAILY
Qty: 90 TABLET | Refills: 5 | Status: SHIPPED | OUTPATIENT
Start: 2021-04-05 | End: 2022-06-13

## 2021-04-05 RX ORDER — BENZONATATE 200 MG/1
200 CAPSULE ORAL 3 TIMES DAILY PRN
Qty: 30 CAPSULE | Refills: 2 | Status: SHIPPED | OUTPATIENT
Start: 2021-04-05 | End: 2021-12-08 | Stop reason: SDUPTHER

## 2021-04-05 RX ADMIN — CYANOCOBALAMIN 1000 MCG: 1000 INJECTION, SOLUTION INTRAMUSCULAR; SUBCUTANEOUS at 15:36

## 2021-04-05 NOTE — PROGRESS NOTES
The ABCs of the Annual Wellness Visit  Subsequent Medicare Wellness Visit    Chief Complaint   Patient presents with   • Medicare Wellness-subsequent     Annual wellness, Med refill       Subjective   History of Present Illness:  Isabel Villalba is a 55 y.o. female who presents for a Subsequent Medicare Wellness Visit.  Patient with hyperlipidemia, type 2 diabetes, hypertension.  Is due for refills of medications.  Says her blood sugar and blood pressure are both staying at goal.    HEALTH RISK ASSESSMENT    Recent Hospitalizations:  No hospitalization(s) within the last year.    Current Medical Providers:  Patient Care Team:  Radha Simental MD as PCP - General (Family Medicine)    Smoking Status:  Social History     Tobacco Use   Smoking Status Current Every Day Smoker   • Packs/day: 0.50   • Types: Cigarettes   Smokeless Tobacco Never Used       Alcohol Consumption:  Social History     Substance and Sexual Activity   Alcohol Use No       Depression Screen:   PHQ-2/PHQ-9 Depression Screening 4/5/2021   Little interest or pleasure in doing things 0   Feeling down, depressed, or hopeless 0   Trouble falling or staying asleep, or sleeping too much -   Feeling tired or having little energy -   Poor appetite or overeating -   Feeling bad about yourself - or that you are a failure or have let yourself or your family down -   Trouble concentrating on things, such as reading the newspaper or watching television -   Moving or speaking so slowly that other people could have noticed. Or the opposite - being so fidgety or restless that you have been moving around a lot more than usual -   Thoughts that you would be better off dead, or of hurting yourself in some way -   Total Score 0       Fall Risk Screen:  STEADI Fall Risk Assessment has not been completed.    Health Habits and Functional and Cognitive Screening:  No flowsheet data found.      Does the patient have evidence of cognitive impairment? No    Asprin use  counseling:Does not need ASA (and currently is not on it)    Age-appropriate Screening Schedule:  Refer to the list below for future screening recommendations based on patient's age, sex and/or medical conditions. Orders for these recommended tests are listed in the plan section. The patient has been provided with a written plan.    Health Maintenance   Topic Date Due   • DXA SCAN  04/05/2021 (Originally 5/2/2020)   • URINE MICROALBUMIN  04/23/2021 (Originally 1965)   • DIABETIC EYE EXAM  05/24/2021 (Originally 10/17/2016)   • ZOSTER VACCINE (1 of 2) 07/10/2023 (Originally 8/21/2014)   • HEMOGLOBIN A1C  05/06/2021   • MAMMOGRAM  07/19/2021   • INFLUENZA VACCINE  08/01/2021   • DIABETIC FOOT EXAM  08/06/2021   • LIPID PANEL  11/06/2021   • PAP SMEAR  10/13/2023   • COLONOSCOPY  03/14/2027   • TDAP/TD VACCINES (3 - Td) 03/14/2027          The following portions of the patient's history were reviewed and updated as appropriate: allergies, current medications, past family history, past medical history, past social history, past surgical history and problem list.    Outpatient Medications Prior to Visit   Medication Sig Dispense Refill   • albuterol sulfate HFA (Ventolin HFA) 108 (90 Base) MCG/ACT inhaler Inhale 2 puffs Every 6 (Six) Hours As Needed for Wheezing or Shortness of Air. 18 g 5   • amLODIPine-benazepril (LOTREL 5-20) 5-20 MG per capsule Take 1 capsule by mouth Daily. 30 capsule 5   • atomoxetine (STRATTERA) 25 MG capsule Take 1 capsule by mouth Daily. 60 capsule 5   • atorvastatin (LIPITOR) 10 MG tablet TAKE 1 TABLET BY MOUTH DAILY. 90 tablet 5   • benzonatate (TESSALON) 200 MG capsule Take 1 capsule by mouth 3 (Three) Times a Day As Needed for Cough. 30 capsule 2   • fluticasone (FLONASE) 50 MCG/ACT nasal spray 2 sprays by Each Nare route Daily. 16 g 5   • glucose blood test strip Patient is testing 2xs daily 100 each 12   • glucose monitor monitoring kit 1 each 2 (two) times a day. Patient is testing 2  xs daily 1 each 0   • guaiFENesin (MUCINEX) 600 MG 12 hr tablet Take 1 tablet by mouth 2 (Two) Times a Day. 14 tablet 0   • HYDROcodone-acetaminophen (NORCO) 5-325 MG per tablet      • ibuprofen (ADVIL,MOTRIN) 600 MG tablet Take 1 tablet by mouth Every 8 (Eight) Hours As Needed for Moderate Pain . 60 tablet 0   • Lancets Misc. [facility] Patient is testing 2 xs daily 100 each 11   • omeprazole (priLOSEC) 20 MG capsule Take 1 capsule by mouth Daily. 60 capsule 5   • pregabalin (LYRICA) 150 MG capsule TAKE 1 CAPSULE BY MOUTH 3 (THREE) TIMES A DAY. 90 capsule 2   • Prolia 60 MG/ML solution prefilled syringe syringe INJECT 1 ML UNDER THE SKIN INTO THE APPROPRIATE AREA AS DIRECTED 1 (ONE) TIME FOR 1 DOSE. 1 mL 0   • Repatha SureClick solution auto-injector SureClick injection INJECT 1 ML UNDER THE SKIN INTO THE APPROPRIATE AREA AS DIRECTED EVERY 14 (FOURTEEN) DAYS. 2 mL 5   • Semaglutide,0.25 or 0.5MG/DOS, (Ozempic, 0.25 or 0.5 MG/DOSE,) 2 MG/1.5ML solution pen-injector Inject 0.5 mg under the skin into the appropriate area as directed 1 (One) Time Per Week. 2 pen 5   • tiZANidine (ZANAFLEX) 4 MG tablet Take 1 tablet by mouth Every 8 (Eight) Hours As Needed for Muscle Spasms. 90 tablet 5   • vitamin D (ERGOCALCIFEROL) 1.25 MG (21027 UT) capsule capsule Take 1 capsule by mouth 2 (Two) Times a Week. 8 capsule 5     No facility-administered medications prior to visit.       Patient Active Problem List   Diagnosis   • Vitamin B12 deficiency   • Systolic murmur   • Pruritic rash   • Protrusion of cervical intervertebral disc   • Lumbago-sciatica due to displacement of lumbar intervertebral disc   • Hyperlipidemia   • Gouty arthropathy   • Gastritis   • Gastroesophageal reflux disease   • Essential hypertension   • Vitamin D deficiency   • Osteoporosis without current pathological fracture   • Acute pain of right shoulder   • Rotator cuff syndrome of left shoulder   • Subacromial bursitis of right shoulder joint       Advanced Care  Planning:  ACP discussion was held with the patient during this visit. Patient does not have an advance directive, declines further assistance.    Review of Systems   Constitutional: Positive for fatigue.   HENT: Negative for congestion, postnasal drip, rhinorrhea, sinus pressure and sore throat.    Respiratory: Negative for choking and shortness of breath.    Gastrointestinal: Negative.    Musculoskeletal: Positive for arthralgias and gait problem. Negative for myalgias.   Skin: Negative.    Allergic/Immunologic: Negative for immunocompromised state.   Neurological: Negative for tremors, seizures and syncope.   Hematological: Negative.    Psychiatric/Behavioral: Negative for agitation, decreased concentration, dysphoric mood, sleep disturbance and suicidal ideas. The patient is not nervous/anxious.    All other systems reviewed and are negative.      Compared to one year ago, the patient feels her physical health is the same.  Compared to one year ago, the patient feels her mental health is the same.    Reviewed chart for potential of high risk medication in the elderly: not applicable  Reviewed chart for potential of harmful drug interactions in the elderly:not applicable    Objective         Vitals:    04/05/21 1117   PainSc:   8   PainLoc: Back  Comment: leg       There is no height or weight on file to calculate BMI.  Discussed the patient's BMI with her. The BMI is in the acceptable range.    Physical Exam  Vitals and nursing note reviewed.   Constitutional:       Appearance: She is well-developed.   HENT:      Head: Normocephalic and atraumatic.      Nose: Nose normal.   Eyes:      Conjunctiva/sclera: Conjunctivae normal.      Pupils: Pupils are equal, round, and reactive to light.   Neck:      Thyroid: No thyromegaly.      Vascular: No JVD.      Trachea: No tracheal deviation.   Cardiovascular:      Rate and Rhythm: Normal rate and regular rhythm.      Heart sounds: Normal heart sounds. No murmur heard.      Pulmonary:      Effort: Pulmonary effort is normal.      Breath sounds: Normal breath sounds. No wheezing.   Abdominal:      General: Bowel sounds are normal. There is no distension.      Palpations: Abdomen is soft.      Tenderness: There is no abdominal tenderness.   Musculoskeletal:         General: Normal range of motion.      Cervical back: Normal range of motion and neck supple.   Lymphadenopathy:      Cervical: No cervical adenopathy.   Skin:     General: Skin is warm and dry.      Findings: No rash.   Neurological:      Mental Status: She is alert and oriented to person, place, and time.      Coordination: Coordination normal.               Assessment/Plan   Medicare Risks and Personalized Health Plan  CMS Preventative Services Quick Reference  Chronic Pain     The above risks/problems have been discussed with the patient.  Pertinent information has been shared with the patient in the After Visit Summary.  Follow up plans and orders are seen below in the Assessment/Plan Section.    Diagnoses and all orders for this visit:    1. Medicare annual wellness visit, subsequent (Primary)    2. Wheezing  -     albuterol sulfate HFA (Ventolin HFA) 108 (90 Base) MCG/ACT inhaler; Inhale 2 puffs Every 6 (Six) Hours As Needed for Wheezing or Shortness of Air.  Dispense: 18 g; Refill: 5    3. Essential hypertension  -     amLODIPine-benazepril (LOTREL 5-20) 5-20 MG per capsule; Take 1 capsule by mouth Daily.  Dispense: 30 capsule; Refill: 5    4. Attention deficit  -     atomoxetine (STRATTERA) 25 MG capsule; Take 1 capsule by mouth Daily.  Dispense: 60 capsule; Refill: 5    5. Cough  -     benzonatate (TESSALON) 200 MG capsule; Take 1 capsule by mouth 3 (Three) Times a Day As Needed for Cough.  Dispense: 30 capsule; Refill: 2    6. Lumbago-sciatica due to displacement of lumbar intervertebral disc  -     ibuprofen (ADVIL,MOTRIN) 600 MG tablet; Take 1 tablet by mouth Every 8 (Eight) Hours As Needed for Moderate Pain .   Dispense: 60 tablet; Refill: 0  -     tiZANidine (ZANAFLEX) 4 MG tablet; Take 1 tablet by mouth Every 8 (Eight) Hours As Needed for Muscle Spasms.  Dispense: 90 tablet; Refill: 5    7. Gastroesophageal reflux disease without esophagitis  -     omeprazole (priLOSEC) 20 MG capsule; Take 1 capsule by mouth Daily.  Dispense: 60 capsule; Refill: 5    8. Hyperlipidemia, unspecified hyperlipidemia type  -     Evolocumab (Repatha SureClick) solution auto-injector SureClick injection; Inject 1 mL under the skin into the appropriate area as directed Every 14 (Fourteen) Days.  Dispense: 2 mL; Refill: 5  -     Lipid Panel; Future  -     TSH  -     T4, Free    9. Type 2 diabetes mellitus with other specified complication, without long-term current use of insulin (CMS/[facility])  -     Semaglutide,0.25 or 0.5MG/DOS, (Ozempic, 0.25 or 0.5 MG/DOSE,) 2 MG/1.5ML solution pen-injector; Inject 0.5 mg under the skin into the appropriate area as directed 1 (One) Time Per Week.  Dispense: 2 pen; Refill: 5  -     Comprehensive Metabolic Panel  -     Hemoglobin A1c    10. Vitamin D deficiency  -     vitamin D (ERGOCALCIFEROL) 1.25 MG (78433 UT) capsule capsule; Take 1 capsule by mouth 2 (Two) Times a Week.  Dispense: 8 capsule; Refill: 5  -     Vitamin D 25 Hydroxy    11. Low serum vitamin B12  -     CBC & Differential; Future  -     Vitamin B12 & Folate    Other orders  -     atorvastatin (LIPITOR) 10 MG tablet; Take 1 tablet by mouth Daily.  Dispense: 90 tablet; Refill: 5  -     fluticasone (FLONASE) 50 MCG/ACT nasal spray; 2 sprays by Each Nare route Daily.  Dispense: 16 g; Refill: 5  -     denosumab (Prolia) 60 MG/ML solution prefilled syringe syringe; Inject 1 mL under the skin into the appropriate area as directed Every 6 (Six) Months.  Dispense: 1 mL; Refill: 1      Follow Up:  No follow-ups on file.     An After Visit Summary and PPPS were given to the patient.     Continue current medications as above for lipids, hypertension, vitamin  deficiency.  Recheck labs in July.    Continue with current diabetes medications and testing of blood sugars at least daily and prn.  Encourage compliance with diabetic diet.     Continue Prilosec for reflux,    Continue ibuprofen and Zanaflex for low back pain    Continue Strattera for ADD symptoms    Continue Prilosec for wheezing    Continue Repatha for lipids          This document has been electronically signed by Radha Simental MD on April 5, 2021 12:35 CDT

## 2021-06-28 DIAGNOSIS — M51.27 LUMBAGO-SCIATICA DUE TO DISPLACEMENT OF LUMBAR INTERVERTEBRAL DISC: ICD-10-CM

## 2021-06-28 RX ORDER — PREGABALIN 150 MG/1
150 CAPSULE ORAL 3 TIMES DAILY
Qty: 90 CAPSULE | Refills: 0 | Status: SHIPPED | OUTPATIENT
Start: 2021-06-28 | End: 2021-07-29

## 2021-07-01 ENCOUNTER — LAB (OUTPATIENT)
Dept: LAB | Facility: OTHER | Age: 56
End: 2021-07-01

## 2021-07-01 ENCOUNTER — OFFICE VISIT (OUTPATIENT)
Dept: CARDIOLOGY | Facility: CLINIC | Age: 56
End: 2021-07-01

## 2021-07-01 ENCOUNTER — DOCUMENTATION (OUTPATIENT)
Dept: CARDIOLOGY | Facility: CLINIC | Age: 56
End: 2021-07-01

## 2021-07-01 VITALS
SYSTOLIC BLOOD PRESSURE: 144 MMHG | BODY MASS INDEX: 24.74 KG/M2 | OXYGEN SATURATION: 97 % | WEIGHT: 126 LBS | HEART RATE: 66 BPM | HEIGHT: 60 IN | DIASTOLIC BLOOD PRESSURE: 80 MMHG

## 2021-07-01 DIAGNOSIS — I10 HYPERTENSION, UNSPECIFIED TYPE: Primary | ICD-10-CM

## 2021-07-01 DIAGNOSIS — E78.5 HYPERLIPIDEMIA, UNSPECIFIED HYPERLIPIDEMIA TYPE: ICD-10-CM

## 2021-07-01 DIAGNOSIS — E78.2 HYPERLIPEMIA, MIXED: ICD-10-CM

## 2021-07-01 DIAGNOSIS — E53.8 LOW SERUM VITAMIN B12: ICD-10-CM

## 2021-07-01 LAB
ALBUMIN SERPL-MCNC: 4.3 G/DL (ref 3.5–5)
ALBUMIN/GLOB SERPL: 1.3 G/DL (ref 1.1–1.8)
ALP SERPL-CCNC: 56 U/L (ref 38–126)
ALT SERPL W P-5'-P-CCNC: 10 U/L
ANION GAP SERPL CALCULATED.3IONS-SCNC: 6 MMOL/L (ref 5–15)
ANISOCYTOSIS BLD QL: ABNORMAL
AST SERPL-CCNC: 20 U/L (ref 14–36)
BILIRUB SERPL-MCNC: 0.3 MG/DL (ref 0.2–1.3)
BUN SERPL-MCNC: 9 MG/DL (ref 7–23)
BUN/CREAT SERPL: 12 (ref 7–25)
CALCIUM SPEC-SCNC: 10.2 MG/DL (ref 8.4–10.2)
CHLORIDE SERPL-SCNC: 104 MMOL/L (ref 101–112)
CHOLEST SERPL-MCNC: 117 MG/DL (ref 150–200)
CO2 SERPL-SCNC: 32 MMOL/L (ref 22–30)
CREAT SERPL-MCNC: 0.75 MG/DL (ref 0.52–1.04)
DEPRECATED RDW RBC AUTO: 41.2 FL (ref 37–54)
EOSINOPHIL # BLD MANUAL: 0.11 10*3/MM3 (ref 0–0.4)
EOSINOPHIL NFR BLD MANUAL: 1 % (ref 0.3–6.2)
ERYTHROCYTE [DISTWIDTH] IN BLOOD BY AUTOMATED COUNT: 12.8 % (ref 12.3–15.4)
GFR SERPL CREATININE-BSD FRML MDRD: 80 ML/MIN/1.73 (ref 51–120)
GLOBULIN UR ELPH-MCNC: 3.2 GM/DL (ref 2.3–3.5)
GLUCOSE SERPL-MCNC: 94 MG/DL (ref 70–99)
HBA1C MFR BLD: 6.19 % (ref 4.8–5.6)
HCT VFR BLD AUTO: 41.3 % (ref 34–46.6)
HDLC SERPL-MCNC: 49 MG/DL (ref 40–59)
HGB BLD-MCNC: 14.2 G/DL (ref 12–15.9)
LDLC SERPL CALC-MCNC: 36 MG/DL
LDLC/HDLC SERPL: 0.56 {RATIO} (ref 0–3.22)
LYMPHOCYTES # BLD MANUAL: 4.34 10*3/MM3 (ref 0.7–3.1)
LYMPHOCYTES NFR BLD MANUAL: 10 % (ref 5–12)
LYMPHOCYTES NFR BLD MANUAL: 38 % (ref 19.6–45.3)
MCH RBC QN AUTO: 30.9 PG (ref 26.6–33)
MCHC RBC AUTO-ENTMCNC: 34.4 G/DL (ref 31.5–35.7)
MCV RBC AUTO: 89.8 FL (ref 79–97)
MONOCYTES # BLD AUTO: 1.14 10*3/MM3 (ref 0.1–0.9)
NEUTROPHILS # BLD AUTO: 5.82 10*3/MM3 (ref 1.7–7)
NEUTROPHILS NFR BLD MANUAL: 51 % (ref 42.7–76)
PLATELET # BLD AUTO: 350 10*3/MM3 (ref 140–450)
PMV BLD AUTO: 9.1 FL (ref 6–12)
POTASSIUM SERPL-SCNC: 4.3 MMOL/L (ref 3.4–5)
PROT SERPL-MCNC: 7.5 G/DL (ref 6.3–8.6)
RBC # BLD AUTO: 4.6 10*6/MM3 (ref 3.77–5.28)
SMALL PLATELETS BLD QL SMEAR: ADEQUATE
SODIUM SERPL-SCNC: 142 MMOL/L (ref 137–145)
TRIGL SERPL-MCNC: 204 MG/DL
VLDLC SERPL-MCNC: 32 MG/DL (ref 5–40)
WBC # BLD AUTO: 11.41 10*3/MM3 (ref 3.4–10.8)
WBC MORPH BLD: NORMAL

## 2021-07-01 PROCEDURE — 82607 VITAMIN B-12: CPT | Performed by: FAMILY MEDICINE

## 2021-07-01 PROCEDURE — 36415 COLL VENOUS BLD VENIPUNCTURE: CPT | Performed by: FAMILY MEDICINE

## 2021-07-01 PROCEDURE — 93000 ELECTROCARDIOGRAM COMPLETE: CPT | Performed by: INTERNAL MEDICINE

## 2021-07-01 PROCEDURE — 82746 ASSAY OF FOLIC ACID SERUM: CPT | Performed by: FAMILY MEDICINE

## 2021-07-01 PROCEDURE — 82306 VITAMIN D 25 HYDROXY: CPT | Performed by: FAMILY MEDICINE

## 2021-07-01 PROCEDURE — 84443 ASSAY THYROID STIM HORMONE: CPT | Performed by: FAMILY MEDICINE

## 2021-07-01 PROCEDURE — 80053 COMPREHEN METABOLIC PANEL: CPT | Performed by: FAMILY MEDICINE

## 2021-07-01 PROCEDURE — 99214 OFFICE O/P EST MOD 30 MIN: CPT | Performed by: INTERNAL MEDICINE

## 2021-07-01 PROCEDURE — 83036 HEMOGLOBIN GLYCOSYLATED A1C: CPT | Performed by: FAMILY MEDICINE

## 2021-07-01 PROCEDURE — 85025 COMPLETE CBC W/AUTO DIFF WBC: CPT | Performed by: FAMILY MEDICINE

## 2021-07-01 PROCEDURE — 84439 ASSAY OF FREE THYROXINE: CPT | Performed by: FAMILY MEDICINE

## 2021-07-01 PROCEDURE — 80061 LIPID PANEL: CPT | Performed by: FAMILY MEDICINE

## 2021-07-01 NOTE — PROGRESS NOTES
Harrison Paredes MD Brown, Karen M, RN  LDL much improved, needs to watch diet for TG       Patient notified and she had no questions or concerns at this time.

## 2021-07-01 NOTE — PROGRESS NOTES
[facility] Cardiology  OFFICE NOTE    Cardiovascular Medicine  Harrison Paredes M.D., RPVI         No referring provider defined for this encounter.    Thank you for asking me to see Isabel Villalba for syncope.    History of Present Illness  This is a 55 y.o. female with:    1.  Hypertension  2.  Hyperlipidemia  3.  Systolic murmur  4.  COPD   5.  active tobacco user    Isabel Villalba is a 55 y.o. female who presents for consultation today.  Couple of weeks ago she had an episode when she was walking outside, she got very hot and subsequently passed out for about 2 minutes.  She was witnessed by her family member and turned pale at the time.  She was tired.  No reports of tongue biting, fecal or urinary incontinence and no seizures.  She is also been having some back pain with exertion.    07/01/2021;  No acute issues since last visit.  No further episodes of syncope.          Review of Systems - ROS  Constitution: Negative for weakness, weight gain and weight loss.   HENT: Negative for congestion.    Eyes: Negative for blurred vision.   Cardiovascular: As mentioned above  Respiratory: Negative for cough and hemoptysis.    Endocrine: Negative for polydipsia and polyuria.   Hematologic/Lymphatic: Negative for bleeding problem. Does not bruise/bleed easily.   Skin: Negative for flushing.   Musculoskeletal: Negative for neck pain and stiffness.   Gastrointestinal: Negative for abdominal pain, diarrhea, jaundice, melena, nausea and vomiting.   Genitourinary: Negative for dysuria and hematuria.   Neurological: Negative for dizziness, focal weakness and numbness.   Psychiatric/Behavioral: Negative for altered mental status and depression.          All other systems were reviewed and were negative.    family history includes ADD / ADHD in an other family member; Bipolar disorder in an other family member; Breast cancer in her father and paternal grandmother; Cancer in her father and another  family member; Coronary artery disease in an other family member; Diabetes in an other family member; Heart disease in an other family member; Hyperlipidemia in an other family member; Hypertension in her mother and another family member; Lung cancer in an other family member; Migraines in an other family member; Other in an other family member; Polycystic ovary syndrome in an other family member; Schizophrenia in an other family member; Stroke in an other family member; Tuberculosis in an other family member.     reports that she has been smoking cigarettes. She has been smoking about 0.50 packs per day. She has never used smokeless tobacco. She reports that she does not drink alcohol and does not use drugs.    Allergies   Allergen Reactions   • Morphine And Related Nausea And Vomiting         Current Outpatient Medications:   •  albuterol sulfate HFA (Ventolin HFA) 108 (90 Base) MCG/ACT inhaler, Inhale 2 puffs Every 6 (Six) Hours As Needed for Wheezing or Shortness of Air., Disp: 18 g, Rfl: 5  •  amLODIPine-benazepril (LOTREL 5-20) 5-20 MG per capsule, Take 1 capsule by mouth Daily., Disp: 30 capsule, Rfl: 5  •  atomoxetine (STRATTERA) 25 MG capsule, Take 1 capsule by mouth Daily., Disp: 60 capsule, Rfl: 5  •  atorvastatin (LIPITOR) 10 MG tablet, Take 1 tablet by mouth Daily., Disp: 90 tablet, Rfl: 5  •  benzonatate (TESSALON) 200 MG capsule, Take 1 capsule by mouth 3 (Three) Times a Day As Needed for Cough., Disp: 30 capsule, Rfl: 2  •  denosumab (Prolia) 60 MG/ML solution prefilled syringe syringe, Inject 1 mL under the skin into the appropriate area as directed Every 6 (Six) Months., Disp: 1 mL, Rfl: 1  •  Evolocumab (Repatha SureClick) solution auto-injector SureClick injection, Inject 1 mL under the skin into the appropriate area as directed Every 14 (Fourteen) Days., Disp: 2 mL, Rfl: 5  •  fluticasone (FLONASE) 50 MCG/ACT nasal spray, 2 sprays by Each Nare route Daily., Disp: 16 g, Rfl: 5  •  glucose blood  "test strip, Patient is testing 2xs daily, Disp: 100 each, Rfl: 12  •  glucose monitor monitoring kit, 1 each 2 (two) times a day. Patient is testing 2 xs daily, Disp: 1 each, Rfl: 0  •  guaiFENesin (MUCINEX) 600 MG 12 hr tablet, Take 1 tablet by mouth 2 (Two) Times a Day., Disp: 14 tablet, Rfl: 0  •  HYDROcodone-acetaminophen (NORCO)  MG per tablet, , Disp: , Rfl:   •  Lancets Misc. misc, Patient is testing 2 xs daily, Disp: 100 each, Rfl: 11  •  omeprazole (priLOSEC) 20 MG capsule, Take 1 capsule by mouth Daily., Disp: 60 capsule, Rfl: 5  •  pregabalin (LYRICA) 150 MG capsule, TAKE 1 CAPSULE BY MOUTH 3 (THREE) TIMES A DAY., Disp: 90 capsule, Rfl: 0  •  Semaglutide,0.25 or 0.5MG/DOS, (Ozempic, 0.25 or 0.5 MG/DOSE,) 2 MG/1.5ML solution pen-injector, Inject 0.5 mg under the skin into the appropriate area as directed 1 (One) Time Per Week., Disp: 2 pen, Rfl: 5  •  tiZANidine (ZANAFLEX) 4 MG tablet, Take 1 tablet by mouth Every 8 (Eight) Hours As Needed for Muscle Spasms., Disp: 90 tablet, Rfl: 5  •  vitamin D (ERGOCALCIFEROL) 1.25 MG (08329 UT) capsule capsule, Take 1 capsule by mouth 2 (Two) Times a Week., Disp: 8 capsule, Rfl: 5    Physical Exam:  Vitals:    07/01/21 0924   BP: 144/80   BP Location: Right arm   Patient Position: Sitting   Cuff Size: Adult   Pulse: 66   SpO2: 97%   Weight: 57.2 kg (126 lb)   Height: 152.4 cm (60\")   PainSc:   9   PainLoc: Back  Comment: hip     Current Pain Level: none  Pulse Ox: Normal  on room air  General: alert, appears stated age and cooperative     Body Habitus: well-nourished    HEENT: Head: Normocephalic, no lesions, without obvious abnormality. No arcus senilis, xanthelasma or xanthomas.    Neuro: alert, oriented x3  Pulses: 2+ and symmetric  JVP: Volume/Pulsation: Normal.  Normal waveforms.   Appropriate inspiratory decrease.  No Kussmaul's. No Guanaco's.   Carotid Exam: no bruit normal pulsation bilaterally   Carotid Volume: normal.     Respirations: no increased " work of breathing   Chest:  Normal    Pulmonary:Normal   Precordium: Normal impulses. P2 is not palpable.  RV Heave: absent  LV Heave: absent  Red Bud:  normal size and placement  Palpable S4: absent.  Heart rate: normal    Heart Rhythm: regular     Heart Sounds: S1: normal  S2: normal  S3: absent   S4: absent  Opening Snap: absent    Pericardial Rub:  Absent: .    Abdomen:   Appearance: normal .  Palpation: Soft, non-tender to palpation, bowel sounds positive in all four quadrants; no guarding or rebound tenderness  Extremity: no edema.   LE Skin: no rashes  LE Hair:  normal  LE Pulses: well perfused with normal pulses in the distal extremities  Pallor on elevation: Absent. Rubor on dependency: None      DATA REVIEWED:     EKG. I personally reviewed and interpreted the EKG.  Sinus rhythm with sinus arrhythmia and sinus exit block.    ECG/EMG Results (all)     Procedure Component Value Units Date/Time    ECG 12 Lead [[id number]] Collected: 10/22/20 1002     Updated: 10/22/20 0958    Narrative:      Test Reason : syncope/ hypertension  Blood Pressure : **/** mmHG  Vent. Rate : 065 BPM     Atrial Rate : 065 BPM     P-R Int : 156 ms          QRS Dur : 100 ms      QT Int : 422 ms       P-R-T Axes : 045 064 064 degrees     QTc Int : 438 ms    Sinus rhythm with marked sinus arrhythmia  Otherwise normal ECG  No previous ECGs available    Referred By:             Confirmed By:         ---------------------------------------------------  TTE/RAQUEL:  Results for orders placed during the hospital encounter of 08/06/19    Adult Transthoracic Echo Complete W/ Cont if Necessary Per Protocol    Interpretation Summary  · Estimated EF appears to be in the range of 56 - 60%  · Left ventricular systolic function is normal.  · Left ventricular diastolic dysfunction (grade I) consistent with impaired relaxation.        --------------------------------------------------------------------------------------------------  LABS:     The 10-year CVD  risk score (Manuelito et al., 2008) is: 52.1%    Values used to calculate the score:      Age: 55 years      Sex: Female      Diabetic: Yes      Tobacco smoker: Yes      Systolic Blood Pressure: 145 mmHg      Is BP treated: Yes      HDL Cholesterol: 39 mg/dL      Total Cholesterol: 280 mg/dL         Lab Results   Component Value Date    GLUCOSE 120 (H) 11/06/2020    BUN 9 11/06/2020    CREATININE 0.72 11/06/2020    EGFRIFNONA 84 11/06/2020    BCR 12.5 11/06/2020    K 4.2 11/06/2020    CO2 29.0 11/06/2020    CALCIUM 9.8 11/06/2020    ALBUMIN 4.20 11/06/2020    AST 19 11/06/2020    ALT 10 11/06/2020     Lab Results   Component Value Date    WBC 10.65 07/06/2020    HGB 15.5 07/06/2020    HCT 45.5 07/06/2020    MCV 86.2 07/06/2020     07/06/2020     Lab Results   Component Value Date    CHOL 280 (H) 07/06/2020    TRIG 412 (H) 07/06/2020    HDL 39 (L) 07/06/2020     (H) 11/06/2020     Lab Results   Component Value Date    TSH 1.090 07/06/2020     No results found for: CKTOTAL, CKMB, CKMBINDEX, TROPONINI, TROPONINT  Lab Results   Component Value Date    HGBA1C 6.72 (H) 11/06/2020     No results found for: DDIMER  Lab Results   Component Value Date    ALT 10 11/06/2020     Lab Results   Component Value Date    HGBA1C 6.72 (H) 11/06/2020    HGBA1C 5.8 (H) 06/24/2014     Lab Results   Component Value Date    CREATININE 0.72 11/06/2020     No results found for: IRON, TIBC, FERRITIN  No results found for: INR, PROTIME    Assessment/Plan     1.  Syncope:  EKG in office today with sinus arrhythmia and potential sinus exit block.  Echocardiogram from July 2019 with EF of 56 to 60% grade 1 diastolic dysfunction.  No significant valvular disease.  Recent TSH was normal in July  Electrolytes are okay.  Heart monitor showed intermittent sinus exit block mostly at night due to high vagal tone.  However this will not cause her syncope.  Currently her symptoms have resolved.  If patient were to have recurrent syncopal  episode will consider loop recorder versus referral to EP for an EP study and consideration for pacemaker.  No carotid bruits audible.  She was also complaining of back pain with exertion, Nuc stress was without any evidence of ischemia    2.  Hypertension:  She is on combination of amlodipine/benazepril 5/20    3.  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  She has been started on Repatha.  Will need repeat lipid panel    4.  Diabetes: I be managed by primary care physician.    Prevention:  Patient's Body mass index is 24.61 kg/m². BMI is within normal parameters. No follow-up required..      Isabel Villalba  reports that she has been smoking cigarettes. She has been smoking about 0.50 packs per day. She has never used smokeless tobacco.. I have educated her on the risk of diseases from using tobacco products such as cancer, COPD and heart disease.     I advised her to quit and she is not willing to quit.    I spent 3  minutes counseling the patient.            This document has been electronically signed by Harrison Paredes MD on July 1, 2021 09:31 CDT

## 2021-07-02 LAB
25(OH)D3 SERPL-MCNC: 69.8 NG/ML (ref 30–100)
FOLATE SERPL-MCNC: 5.33 NG/ML (ref 4.78–24.2)
QT INTERVAL: 402 MS
QTC INTERVAL: 421 MS
T4 FREE SERPL-MCNC: 1.02 NG/DL (ref 0.93–1.7)
TSH SERPL DL<=0.05 MIU/L-ACNC: 1.21 UIU/ML (ref 0.27–4.2)
VIT B12 BLD-MCNC: 242 PG/ML (ref 211–946)

## 2021-07-06 DIAGNOSIS — E53.8 B12 DEFICIENCY: Primary | ICD-10-CM

## 2021-07-06 RX ORDER — CYANOCOBALAMIN 1000 UG/ML
1000 INJECTION, SOLUTION INTRAMUSCULAR; SUBCUTANEOUS
Status: SHIPPED | OUTPATIENT
Start: 2021-07-07

## 2021-07-07 ENCOUNTER — CLINICAL SUPPORT (OUTPATIENT)
Dept: FAMILY MEDICINE CLINIC | Facility: CLINIC | Age: 56
End: 2021-07-07

## 2021-07-07 DIAGNOSIS — E53.8 VITAMIN B12 DEFICIENCY: ICD-10-CM

## 2021-07-07 PROCEDURE — 96372 THER/PROPH/DIAG INJ SC/IM: CPT | Performed by: FAMILY MEDICINE

## 2021-07-07 RX ADMIN — CYANOCOBALAMIN 1000 MCG: 1000 INJECTION, SOLUTION INTRAMUSCULAR; SUBCUTANEOUS at 14:21

## 2021-07-27 DIAGNOSIS — M51.27 LUMBAGO-SCIATICA DUE TO DISPLACEMENT OF LUMBAR INTERVERTEBRAL DISC: ICD-10-CM

## 2021-07-29 RX ORDER — PREGABALIN 150 MG/1
CAPSULE ORAL
Qty: 90 CAPSULE | Refills: 0 | Status: SHIPPED | OUTPATIENT
Start: 2021-07-29 | End: 2021-12-08 | Stop reason: SDUPTHER

## 2021-11-24 DIAGNOSIS — M51.27 LUMBAGO-SCIATICA DUE TO DISPLACEMENT OF LUMBAR INTERVERTEBRAL DISC: ICD-10-CM

## 2021-11-29 RX ORDER — PREGABALIN 150 MG/1
CAPSULE ORAL
Qty: 90 CAPSULE | Refills: 0 | OUTPATIENT
Start: 2021-11-29

## 2021-12-01 RX ORDER — DENOSUMAB 60 MG/ML
60 INJECTION SUBCUTANEOUS
Qty: 1 ML | Refills: 1 | Status: SHIPPED | OUTPATIENT
Start: 2021-12-01 | End: 2022-06-30 | Stop reason: SDUPTHER

## 2021-12-08 ENCOUNTER — OFFICE VISIT (OUTPATIENT)
Dept: FAMILY MEDICINE CLINIC | Facility: CLINIC | Age: 56
End: 2021-12-08

## 2021-12-08 VITALS
SYSTOLIC BLOOD PRESSURE: 100 MMHG | TEMPERATURE: 98.6 F | DIASTOLIC BLOOD PRESSURE: 70 MMHG | OXYGEN SATURATION: 97 % | HEART RATE: 94 BPM

## 2021-12-08 DIAGNOSIS — M51.27 LUMBAGO-SCIATICA DUE TO DISPLACEMENT OF LUMBAR INTERVERTEBRAL DISC: ICD-10-CM

## 2021-12-08 DIAGNOSIS — M81.0 OSTEOPOROSIS, UNSPECIFIED OSTEOPOROSIS TYPE, UNSPECIFIED PATHOLOGICAL FRACTURE PRESENCE: Primary | ICD-10-CM

## 2021-12-08 DIAGNOSIS — R05.9 COUGH: ICD-10-CM

## 2021-12-08 DIAGNOSIS — R06.2 WHEEZING: ICD-10-CM

## 2021-12-08 PROCEDURE — 96372 THER/PROPH/DIAG INJ SC/IM: CPT | Performed by: FAMILY MEDICINE

## 2021-12-08 PROCEDURE — 99214 OFFICE O/P EST MOD 30 MIN: CPT | Performed by: FAMILY MEDICINE

## 2021-12-08 RX ORDER — PREGABALIN 150 MG/1
150 CAPSULE ORAL 3 TIMES DAILY
Qty: 90 CAPSULE | Refills: 2 | Status: CANCELLED | OUTPATIENT
Start: 2021-12-08

## 2021-12-08 RX ORDER — ALBUTEROL SULFATE 90 UG/1
2 AEROSOL, METERED RESPIRATORY (INHALATION) EVERY 6 HOURS PRN
Qty: 18 G | Refills: 5 | Status: SHIPPED | OUTPATIENT
Start: 2021-12-08 | End: 2022-06-30 | Stop reason: SDUPTHER

## 2021-12-08 RX ORDER — PREGABALIN 150 MG/1
150 CAPSULE ORAL DAILY
Qty: 90 CAPSULE | Refills: 2 | Status: SHIPPED | OUTPATIENT
Start: 2021-12-08 | End: 2021-12-29 | Stop reason: SDUPTHER

## 2021-12-08 RX ORDER — BENZONATATE 200 MG/1
200 CAPSULE ORAL 3 TIMES DAILY PRN
Qty: 30 CAPSULE | Refills: 2 | Status: CANCELLED | OUTPATIENT
Start: 2021-12-08

## 2021-12-08 RX ORDER — BENZONATATE 200 MG/1
200 CAPSULE ORAL 3 TIMES DAILY PRN
Qty: 30 CAPSULE | Refills: 2 | Status: SHIPPED | OUTPATIENT
Start: 2021-12-08

## 2021-12-08 RX ORDER — ALBUTEROL SULFATE 90 UG/1
2 AEROSOL, METERED RESPIRATORY (INHALATION) EVERY 6 HOURS PRN
Qty: 18 G | Refills: 5 | Status: CANCELLED | OUTPATIENT
Start: 2021-12-08

## 2021-12-08 NOTE — PROGRESS NOTES
Subjective   Isabel Villalba is a 56 y.o. female.   Here today for follow-up on chronic low back pain, osteoporosis, and lung issues.  She is a longtime smoker and I suspect has COPD.  She has some wheezing and a chronic cough, uses albuterol for the wheezing and benzonatate when needed for the cough.    She does use Prolia for the osteoporosis.  This needs refilled.  She is also on vitamin D and calcium.  She will be due a bone density this year.    She uses Lyrica for the low back pain and this has worked well for her.  She will need a refill of it.  History of Present Illness    The following portions of the patient's history were reviewed and updated as appropriate: allergies, current medications, past family history, past medical history, past social history, past surgical history and problem list.    Review of Systems   Constitutional: Positive for fatigue.   HENT: Negative for congestion, postnasal drip, rhinorrhea, sinus pressure and sore throat.    Respiratory: Negative for choking and shortness of breath.    Gastrointestinal: Negative.    Musculoskeletal: Positive for arthralgias and gait problem. Negative for myalgias.   Skin: Negative.    Allergic/Immunologic: Negative for immunocompromised state.   Neurological: Negative for tremors, seizures and syncope.   Hematological: Negative.    Psychiatric/Behavioral: Negative for agitation, decreased concentration, dysphoric mood, sleep disturbance and suicidal ideas. The patient is not nervous/anxious.    All other systems reviewed and are negative.      Objective    There is no height or weight on file to calculate BMI.  Vitals:    12/08/21 1442   BP: 100/70   Pulse: 94   Temp: 98.6 °F (37 °C)   SpO2: 97%       Physical Exam  Vitals and nursing note reviewed.   Constitutional:       Appearance: She is well-developed.   HENT:      Head: Normocephalic and atraumatic.      Nose: Nose normal.   Eyes:      Conjunctiva/sclera: Conjunctivae normal.      Pupils:  Pupils are equal, round, and reactive to light.   Neck:      Thyroid: No thyromegaly.      Vascular: No JVD.      Trachea: No tracheal deviation.   Cardiovascular:      Rate and Rhythm: Normal rate and regular rhythm.      Heart sounds: Normal heart sounds. No murmur heard.      Pulmonary:      Effort: Pulmonary effort is normal.      Breath sounds: Normal breath sounds. No wheezing.   Abdominal:      General: Bowel sounds are normal. There is no distension.      Palpations: Abdomen is soft.      Tenderness: There is no abdominal tenderness.   Musculoskeletal:         General: Normal range of motion.      Cervical back: Normal range of motion and neck supple.   Lymphadenopathy:      Cervical: No cervical adenopathy.   Skin:     General: Skin is warm and dry.      Findings: No rash.   Neurological:      Mental Status: She is alert and oriented to person, place, and time.      Coordination: Coordination normal.         Assessment/Plan   Diagnoses and all orders for this visit:    1. Osteoporosis, unspecified osteoporosis type, unspecified pathological fracture presence (Primary)  -     denosumab (PROLIA) syringe 60 mg  -     DEXA Bone Density Axial; Future    2. Lumbago-sciatica due to displacement of lumbar intervertebral disc  -     pregabalin (LYRICA) 150 MG capsule; Take 1 capsule by mouth Daily.  Dispense: 90 capsule; Refill: 2    3. Wheezing  -     albuterol sulfate HFA (Ventolin HFA) 108 (90 Base) MCG/ACT inhaler; Inhale 2 puffs Every 6 (Six) Hours As Needed for Wheezing or Shortness of Air.  Dispense: 18 g; Refill: 5    4. Cough  -     benzonatate (TESSALON) 200 MG capsule; Take 1 capsule by mouth 3 (Three) Times a Day As Needed for Cough.  Dispense: 30 capsule; Refill: 2    The patient has read and signed the [facility] Controlled Substance Contract.  I will continue to see patient for regular follow up appointments. Patient is well controlled on the medication.  DEYVI has been reviewed by me and is  updated every 3 months. The patient is aware of the potential for addiction and dependence.   Continue Lyrica for the low back pain as prescribed and refill assessed in 3 months    Continue Prolia along with calcium and vitamin D supplements for osteoporosis and will schedule bone density.    Continue albuterol for wheezing, benzonatate for cough, likely related to COPD smoking cessation strongly encouraged          This document has been electronically signed by Radha Simental MD on December 8, 2021 16:11 CST

## 2021-12-20 DIAGNOSIS — E78.5 HYPERLIPIDEMIA, UNSPECIFIED HYPERLIPIDEMIA TYPE: ICD-10-CM

## 2021-12-21 RX ORDER — EVOLOCUMAB 140 MG/ML
140 INJECTION, SOLUTION SUBCUTANEOUS
Qty: 2 ML | Refills: 5 | Status: SHIPPED | OUTPATIENT
Start: 2021-12-21 | End: 2022-06-30 | Stop reason: SDUPTHER

## 2021-12-29 DIAGNOSIS — M51.27 LUMBAGO-SCIATICA DUE TO DISPLACEMENT OF LUMBAR INTERVERTEBRAL DISC: ICD-10-CM

## 2021-12-29 RX ORDER — PREGABALIN 150 MG/1
150 CAPSULE ORAL 3 TIMES DAILY
Qty: 90 CAPSULE | Refills: 2 | Status: SHIPPED | OUTPATIENT
Start: 2021-12-29 | End: 2022-04-22

## 2022-02-24 ENCOUNTER — PRIOR AUTHORIZATION (OUTPATIENT)
Dept: FAMILY MEDICINE CLINIC | Facility: CLINIC | Age: 57
End: 2022-02-24

## 2022-02-24 NOTE — TELEPHONE ENCOUNTER
LAKISHA EDWARDS (Key: [id number])  Repatha SureClick 140MG/ML auto-injectors     PA Case: [id number], Status: Approved, Coverage Starts on: 1/1/2022 12:00:00 AM, Coverage Ends on: 12/31/2022 12:00:00 AM.

## 2022-03-18 DIAGNOSIS — M51.27 LUMBAGO-SCIATICA DUE TO DISPLACEMENT OF LUMBAR INTERVERTEBRAL DISC: ICD-10-CM

## 2022-03-18 DIAGNOSIS — I10 ESSENTIAL HYPERTENSION: ICD-10-CM

## 2022-03-18 RX ORDER — TIZANIDINE 4 MG/1
4 TABLET ORAL EVERY 8 HOURS PRN
Qty: 270 TABLET | Refills: 5 | Status: SHIPPED | OUTPATIENT
Start: 2022-03-18 | End: 2022-06-30 | Stop reason: SDUPTHER

## 2022-03-18 RX ORDER — AMLODIPINE BESYLATE AND BENAZEPRIL HYDROCHLORIDE 5; 20 MG/1; MG/1
1 CAPSULE ORAL DAILY
Qty: 90 CAPSULE | Refills: 5 | Status: SHIPPED | OUTPATIENT
Start: 2022-03-18 | End: 2022-06-30 | Stop reason: SDUPTHER

## 2022-03-23 DIAGNOSIS — K21.9 GASTROESOPHAGEAL REFLUX DISEASE WITHOUT ESOPHAGITIS: ICD-10-CM

## 2022-03-23 RX ORDER — OMEPRAZOLE 20 MG/1
20 CAPSULE, DELAYED RELEASE ORAL DAILY
Qty: 60 CAPSULE | Refills: 5 | Status: SHIPPED | OUTPATIENT
Start: 2022-03-23 | End: 2022-06-30 | Stop reason: SDUPTHER

## 2022-04-18 DIAGNOSIS — E11.69 TYPE 2 DIABETES MELLITUS WITH OTHER SPECIFIED COMPLICATION, WITHOUT LONG-TERM CURRENT USE OF INSULIN: ICD-10-CM

## 2022-04-18 RX ORDER — SEMAGLUTIDE 1.34 MG/ML
0.5 INJECTION, SOLUTION SUBCUTANEOUS WEEKLY
Qty: 1.5 ML | Refills: 5 | Status: SHIPPED | OUTPATIENT
Start: 2022-04-18 | End: 2022-10-25

## 2022-04-21 DIAGNOSIS — M51.27 LUMBAGO-SCIATICA DUE TO DISPLACEMENT OF LUMBAR INTERVERTEBRAL DISC: ICD-10-CM

## 2022-04-22 RX ORDER — PREGABALIN 150 MG/1
150 CAPSULE ORAL 3 TIMES DAILY
Qty: 90 CAPSULE | Refills: 0 | Status: SHIPPED | OUTPATIENT
Start: 2022-04-22 | End: 2022-05-19

## 2022-05-19 DIAGNOSIS — M51.27 LUMBAGO-SCIATICA DUE TO DISPLACEMENT OF LUMBAR INTERVERTEBRAL DISC: ICD-10-CM

## 2022-05-19 RX ORDER — PREGABALIN 150 MG/1
150 CAPSULE ORAL 3 TIMES DAILY
Qty: 90 CAPSULE | Refills: 0 | Status: SHIPPED | OUTPATIENT
Start: 2022-05-19 | End: 2022-06-30 | Stop reason: SDUPTHER

## 2022-06-06 ENCOUNTER — CLINICAL SUPPORT (OUTPATIENT)
Dept: FAMILY MEDICINE CLINIC | Facility: CLINIC | Age: 57
End: 2022-06-06

## 2022-06-06 DIAGNOSIS — M81.0 AGE-RELATED OSTEOPOROSIS WITHOUT CURRENT PATHOLOGICAL FRACTURE: Primary | ICD-10-CM

## 2022-06-06 PROCEDURE — 96372 THER/PROPH/DIAG INJ SC/IM: CPT | Performed by: FAMILY MEDICINE

## 2022-06-13 DIAGNOSIS — R41.840 ATTENTION DEFICIT: ICD-10-CM

## 2022-06-13 RX ORDER — ATOMOXETINE 25 MG/1
25 CAPSULE ORAL DAILY
Qty: 90 CAPSULE | Refills: 5 | Status: SHIPPED | OUTPATIENT
Start: 2022-06-13

## 2022-06-13 RX ORDER — ATORVASTATIN CALCIUM 10 MG/1
10 TABLET, FILM COATED ORAL DAILY
Qty: 90 TABLET | Refills: 5 | Status: SHIPPED | OUTPATIENT
Start: 2022-06-13

## 2022-06-20 DIAGNOSIS — M51.27 LUMBAGO-SCIATICA DUE TO DISPLACEMENT OF LUMBAR INTERVERTEBRAL DISC: ICD-10-CM

## 2022-06-21 RX ORDER — PREGABALIN 150 MG/1
150 CAPSULE ORAL 3 TIMES DAILY
Qty: 90 CAPSULE | Refills: 0 | OUTPATIENT
Start: 2022-06-21

## 2022-06-30 ENCOUNTER — OFFICE VISIT (OUTPATIENT)
Dept: FAMILY MEDICINE CLINIC | Facility: CLINIC | Age: 57
End: 2022-06-30

## 2022-06-30 VITALS
OXYGEN SATURATION: 98 % | DIASTOLIC BLOOD PRESSURE: 72 MMHG | TEMPERATURE: 97.8 F | HEIGHT: 60 IN | BODY MASS INDEX: 24.74 KG/M2 | WEIGHT: 126 LBS | HEART RATE: 83 BPM | SYSTOLIC BLOOD PRESSURE: 140 MMHG

## 2022-06-30 DIAGNOSIS — R06.2 WHEEZING: ICD-10-CM

## 2022-06-30 DIAGNOSIS — M81.0 AGE-RELATED OSTEOPOROSIS WITHOUT CURRENT PATHOLOGICAL FRACTURE: ICD-10-CM

## 2022-06-30 DIAGNOSIS — Z12.31 ENCOUNTER FOR SCREENING MAMMOGRAM FOR MALIGNANT NEOPLASM OF BREAST: ICD-10-CM

## 2022-06-30 DIAGNOSIS — E78.5 HYPERLIPIDEMIA, UNSPECIFIED HYPERLIPIDEMIA TYPE: ICD-10-CM

## 2022-06-30 DIAGNOSIS — E55.9 VITAMIN D DEFICIENCY: ICD-10-CM

## 2022-06-30 DIAGNOSIS — J30.2 SEASONAL ALLERGIES: ICD-10-CM

## 2022-06-30 DIAGNOSIS — M51.27 LUMBAGO-SCIATICA DUE TO DISPLACEMENT OF LUMBAR INTERVERTEBRAL DISC: Primary | ICD-10-CM

## 2022-06-30 DIAGNOSIS — I10 ESSENTIAL HYPERTENSION: ICD-10-CM

## 2022-06-30 DIAGNOSIS — K21.9 GASTROESOPHAGEAL REFLUX DISEASE WITHOUT ESOPHAGITIS: ICD-10-CM

## 2022-06-30 PROCEDURE — 99214 OFFICE O/P EST MOD 30 MIN: CPT | Performed by: FAMILY MEDICINE

## 2022-06-30 RX ORDER — TIZANIDINE 4 MG/1
4 TABLET ORAL EVERY 8 HOURS PRN
Qty: 270 TABLET | Refills: 5 | Status: SHIPPED | OUTPATIENT
Start: 2022-06-30

## 2022-06-30 RX ORDER — OMEPRAZOLE 20 MG/1
20 CAPSULE, DELAYED RELEASE ORAL DAILY
Qty: 60 CAPSULE | Refills: 5 | Status: SHIPPED | OUTPATIENT
Start: 2022-06-30

## 2022-06-30 RX ORDER — DENOSUMAB 60 MG/ML
60 INJECTION SUBCUTANEOUS
Qty: 1 ML | Refills: 1 | Status: SHIPPED | OUTPATIENT
Start: 2022-06-30

## 2022-06-30 RX ORDER — EVOLOCUMAB 140 MG/ML
140 INJECTION, SOLUTION SUBCUTANEOUS
Qty: 2 ML | Refills: 5 | Status: SHIPPED | OUTPATIENT
Start: 2022-06-30 | End: 2023-04-05

## 2022-06-30 RX ORDER — AMLODIPINE BESYLATE AND BENAZEPRIL HYDROCHLORIDE 5; 20 MG/1; MG/1
1 CAPSULE ORAL DAILY
Qty: 90 CAPSULE | Refills: 5 | Status: SHIPPED | OUTPATIENT
Start: 2022-06-30

## 2022-06-30 RX ORDER — PREGABALIN 150 MG/1
150 CAPSULE ORAL 3 TIMES DAILY
Qty: 90 CAPSULE | Refills: 0 | Status: SHIPPED | OUTPATIENT
Start: 2022-06-30 | End: 2022-07-28

## 2022-06-30 RX ORDER — ALBUTEROL SULFATE 90 UG/1
2 AEROSOL, METERED RESPIRATORY (INHALATION) EVERY 6 HOURS PRN
Qty: 18 G | Refills: 5 | Status: SHIPPED | OUTPATIENT
Start: 2022-06-30

## 2022-06-30 RX ORDER — FLUTICASONE PROPIONATE 50 MCG
2 SPRAY, SUSPENSION (ML) NASAL DAILY
Qty: 16 G | Refills: 5 | Status: SHIPPED | OUTPATIENT
Start: 2022-06-30

## 2022-06-30 RX ORDER — ERGOCALCIFEROL 1.25 MG/1
50000 CAPSULE ORAL 2 TIMES WEEKLY
Qty: 8 CAPSULE | Refills: 5 | Status: SHIPPED | OUTPATIENT
Start: 2022-06-30

## 2022-06-30 NOTE — PROGRESS NOTES
"Subjective   Isabelkrystle Villalba is a 56 y.o. female.   Here today for follow-up on chronic low back pain, GERD, hypertension, hyperlipidemia, vitamin D deficiency, osteoporosis, and lung issues.  Back pain symptoms are currently managed with Lyrica and tizanidine which worked well and she needs refills.    She is on Repatha for hyperlipidemia as she has statin intolerance.  She will be due for labs next month.    She needs a refill of omeprazole for GERD.    She is due for refill of her Lotrel for hypertension.  Blood pressure has been well controlled on this.    She uses albuterol when needed for wheezing.    History of Present Illness    The following portions of the patient's history were reviewed and updated as appropriate: allergies, current medications, past family history, past medical history, past social history, past surgical history and problem list.    Review of Systems   Constitutional: Positive for fatigue.   HENT: Negative for congestion, postnasal drip, rhinorrhea, sinus pressure and sore throat.    Respiratory: Negative for choking and shortness of breath.    Gastrointestinal: Negative.    Musculoskeletal: Positive for arthralgias and gait problem. Negative for myalgias.   Skin: Negative.    Allergic/Immunologic: Negative for immunocompromised state.   Neurological: Negative for tremors, seizures and syncope.   Hematological: Negative.    Psychiatric/Behavioral: Negative for agitation, decreased concentration, dysphoric mood, sleep disturbance and suicidal ideas. The patient is not nervous/anxious.    All other systems reviewed and are negative.      Objective    Body mass index is 24.61 kg/m².  Vitals:    06/30/22 0935   BP: 140/72   Pulse: 83   Temp: 97.8 °F (36.6 °C)   TempSrc: Tympanic   SpO2: 98%   Weight: 57.2 kg (126 lb)   Height: 152.4 cm (60\")       Physical Exam  Vitals and nursing note reviewed.   Constitutional:       Appearance: She is well-developed.   HENT:      Head: Normocephalic " and atraumatic.      Nose: Nose normal.   Eyes:      Conjunctiva/sclera: Conjunctivae normal.      Pupils: Pupils are equal, round, and reactive to light.   Neck:      Thyroid: No thyromegaly.      Vascular: No JVD.      Trachea: No tracheal deviation.   Cardiovascular:      Rate and Rhythm: Normal rate and regular rhythm.      Heart sounds: Normal heart sounds. No murmur heard.  Pulmonary:      Effort: Pulmonary effort is normal.      Breath sounds: Normal breath sounds. No wheezing.   Abdominal:      General: Bowel sounds are normal. There is no distension.      Palpations: Abdomen is soft.      Tenderness: There is no abdominal tenderness.   Musculoskeletal:         General: Normal range of motion.      Cervical back: Normal range of motion and neck supple.   Lymphadenopathy:      Cervical: No cervical adenopathy.   Skin:     General: Skin is warm and dry.      Findings: No rash.   Neurological:      Mental Status: She is alert and oriented to person, place, and time.      Coordination: Coordination normal.         Assessment & Plan   Diagnoses and all orders for this visit:    1. Lumbago-sciatica due to displacement of lumbar intervertebral disc (Primary)  -     pregabalin (LYRICA) 150 MG capsule; Take 1 capsule by mouth 3 (Three) Times a Day.  Dispense: 90 capsule; Refill: 0  -     tiZANidine (ZANAFLEX) 4 MG tablet; Take 1 tablet by mouth Every 8 (Eight) Hours As Needed for Muscle Spasms.  Dispense: 270 tablet; Refill: 5    2. Wheezing  -     albuterol sulfate HFA (Ventolin HFA) 108 (90 Base) MCG/ACT inhaler; Inhale 2 puffs Every 6 (Six) Hours As Needed for Wheezing or Shortness of Air.  Dispense: 18 g; Refill: 5    3. Essential hypertension  -     amLODIPine-benazepril (LOTREL 5-20) 5-20 MG per capsule; Take 1 capsule by mouth Daily.  Dispense: 90 capsule; Refill: 5    4. Gastroesophageal reflux disease without esophagitis  -     omeprazole (priLOSEC) 20 MG capsule; Take 1 capsule by mouth Daily.  Dispense: 60  capsule; Refill: 5    5. Hyperlipidemia, unspecified hyperlipidemia type  -     Evolocumab (Repatha SureClick) solution auto-injector SureClick injection; Inject 1 mL under the skin into the appropriate area as directed Every 14 (Fourteen) Days.  Dispense: 2 mL; Refill: 5    6. Vitamin D deficiency  -     vitamin D (ERGOCALCIFEROL) 1.25 MG (41492 UT) capsule capsule; Take 1 capsule by mouth 2 (Two) Times a Week.  Dispense: 8 capsule; Refill: 5    7. Encounter for screening mammogram for malignant neoplasm of breast  -     Mammo Screening Bilateral With CAD; Future    8. Age-related osteoporosis without current pathological fracture  -     denosumab (Prolia) 60 MG/ML solution prefilled syringe syringe; Inject 1 mL under the skin into the appropriate area as directed Every 6 (Six) Months.  Dispense: 1 mL; Refill: 1    9. Seasonal allergies  -     fluticasone (FLONASE) 50 MCG/ACT nasal spray; 2 sprays by Each Nare route Daily.  Dispense: 16 g; Refill: 5    Mammogram is ordered today.    Continue Prolia along with calcium vitamin D for osteoporosis    Continue current medications for hypertension and continue to monitor blood pressures regularly with goal of 130/80 or less    Continue albuterol inhaler for wheezing, suspect from COPD as she is a longtime smoker.  Smoking cessation encouraged.    Continue vitamin D supplements    Continue Lotrel for hypertension with regular monitoring of goal of 130/80 or less.        This document has been electronically signed by Radha Simental MD on June 30, 2022 10:10 CDT

## 2022-07-28 DIAGNOSIS — M51.27 LUMBAGO-SCIATICA DUE TO DISPLACEMENT OF LUMBAR INTERVERTEBRAL DISC: ICD-10-CM

## 2022-07-28 RX ORDER — PREGABALIN 150 MG/1
150 CAPSULE ORAL 3 TIMES DAILY
Qty: 90 CAPSULE | Refills: 0 | Status: SHIPPED | OUTPATIENT
Start: 2022-07-28 | End: 2022-09-20

## 2022-08-02 ENCOUNTER — LAB (OUTPATIENT)
Dept: LAB | Facility: OTHER | Age: 57
End: 2022-08-02

## 2022-08-02 DIAGNOSIS — E55.9 VITAMIN D DEFICIENCY: ICD-10-CM

## 2022-08-02 DIAGNOSIS — I10 ESSENTIAL HYPERTENSION: Primary | ICD-10-CM

## 2022-08-02 DIAGNOSIS — E53.8 VITAMIN B12 DEFICIENCY: ICD-10-CM

## 2022-08-02 DIAGNOSIS — I10 ESSENTIAL HYPERTENSION: ICD-10-CM

## 2022-08-02 DIAGNOSIS — E78.5 HYPERLIPIDEMIA, UNSPECIFIED HYPERLIPIDEMIA TYPE: ICD-10-CM

## 2022-08-02 DIAGNOSIS — E11.69 TYPE 2 DIABETES MELLITUS WITH OTHER SPECIFIED COMPLICATION, WITHOUT LONG-TERM CURRENT USE OF INSULIN: ICD-10-CM

## 2022-08-02 LAB
ALBUMIN SERPL-MCNC: 4.3 G/DL (ref 3.5–5)
ALBUMIN/GLOB SERPL: 1.3 G/DL (ref 1.1–1.8)
ALP SERPL-CCNC: 52 U/L (ref 38–126)
ALT SERPL W P-5'-P-CCNC: 11 U/L
ANION GAP SERPL CALCULATED.3IONS-SCNC: 5 MMOL/L (ref 5–15)
AST SERPL-CCNC: 19 U/L (ref 14–36)
BASOPHILS # BLD AUTO: 0.05 10*3/MM3 (ref 0–0.2)
BASOPHILS NFR BLD AUTO: 0.5 % (ref 0–1.5)
BILIRUB SERPL-MCNC: 0.3 MG/DL (ref 0.2–1.3)
BUN SERPL-MCNC: 9 MG/DL (ref 7–23)
BUN/CREAT SERPL: 11.7 (ref 7–25)
CALCIUM SPEC-SCNC: 9.2 MG/DL (ref 8.4–10.2)
CHLORIDE SERPL-SCNC: 107 MMOL/L (ref 101–112)
CHOLEST SERPL-MCNC: 148 MG/DL (ref 150–200)
CO2 SERPL-SCNC: 28 MMOL/L (ref 22–30)
CREAT SERPL-MCNC: 0.77 MG/DL (ref 0.52–1.04)
DEPRECATED RDW RBC AUTO: 43.5 FL (ref 37–54)
EGFRCR SERPLBLD CKD-EPI 2021: 90.1 ML/MIN/1.73
EOSINOPHIL # BLD AUTO: 0.06 10*3/MM3 (ref 0–0.4)
EOSINOPHIL NFR BLD AUTO: 0.6 % (ref 0.3–6.2)
ERYTHROCYTE [DISTWIDTH] IN BLOOD BY AUTOMATED COUNT: 13.5 % (ref 12.3–15.4)
GLOBULIN UR ELPH-MCNC: 3.2 GM/DL (ref 2.3–3.5)
GLUCOSE SERPL-MCNC: 91 MG/DL (ref 70–99)
HCT VFR BLD AUTO: 41.8 % (ref 34–46.6)
HDLC SERPL-MCNC: 41 MG/DL (ref 40–59)
HGB BLD-MCNC: 13.7 G/DL (ref 12–15.9)
LDLC SERPL CALC-MCNC: 68 MG/DL
LDLC/HDLC SERPL: 1.44 {RATIO} (ref 0–3.22)
LYMPHOCYTES # BLD AUTO: 3.7 10*3/MM3 (ref 0.7–3.1)
LYMPHOCYTES NFR BLD AUTO: 37.6 % (ref 19.6–45.3)
MCH RBC QN AUTO: 29.1 PG (ref 26.6–33)
MCHC RBC AUTO-ENTMCNC: 32.8 G/DL (ref 31.5–35.7)
MCV RBC AUTO: 88.7 FL (ref 79–97)
MONOCYTES # BLD AUTO: 0.96 10*3/MM3 (ref 0.1–0.9)
MONOCYTES NFR BLD AUTO: 9.7 % (ref 5–12)
NEUTROPHILS NFR BLD AUTO: 5.08 10*3/MM3 (ref 1.7–7)
NEUTROPHILS NFR BLD AUTO: 51.6 % (ref 42.7–76)
PLATELET # BLD AUTO: 397 10*3/MM3 (ref 140–450)
PMV BLD AUTO: 9.1 FL (ref 6–12)
POTASSIUM SERPL-SCNC: 4.1 MMOL/L (ref 3.4–5)
PROT SERPL-MCNC: 7.5 G/DL (ref 6.3–8.6)
RBC # BLD AUTO: 4.71 10*6/MM3 (ref 3.77–5.28)
SODIUM SERPL-SCNC: 140 MMOL/L (ref 137–145)
TRIGL SERPL-MCNC: 239 MG/DL
VLDLC SERPL-MCNC: 39 MG/DL (ref 5–40)
WBC NRBC COR # BLD: 9.85 10*3/MM3 (ref 3.4–10.8)

## 2022-08-02 PROCEDURE — 84443 ASSAY THYROID STIM HORMONE: CPT | Performed by: FAMILY MEDICINE

## 2022-08-02 PROCEDURE — 80053 COMPREHEN METABOLIC PANEL: CPT | Performed by: FAMILY MEDICINE

## 2022-08-02 PROCEDURE — 82306 VITAMIN D 25 HYDROXY: CPT | Performed by: FAMILY MEDICINE

## 2022-08-02 PROCEDURE — 36415 COLL VENOUS BLD VENIPUNCTURE: CPT | Performed by: FAMILY MEDICINE

## 2022-08-02 PROCEDURE — 83036 HEMOGLOBIN GLYCOSYLATED A1C: CPT | Performed by: FAMILY MEDICINE

## 2022-08-02 PROCEDURE — 82746 ASSAY OF FOLIC ACID SERUM: CPT | Performed by: FAMILY MEDICINE

## 2022-08-02 PROCEDURE — 80061 LIPID PANEL: CPT | Performed by: FAMILY MEDICINE

## 2022-08-02 PROCEDURE — 84439 ASSAY OF FREE THYROXINE: CPT | Performed by: FAMILY MEDICINE

## 2022-08-02 PROCEDURE — 85025 COMPLETE CBC W/AUTO DIFF WBC: CPT | Performed by: FAMILY MEDICINE

## 2022-08-02 PROCEDURE — 82607 VITAMIN B-12: CPT | Performed by: FAMILY MEDICINE

## 2022-08-03 LAB
25(OH)D3 SERPL-MCNC: 40.7 NG/ML (ref 30–100)
FOLATE SERPL-MCNC: 5.27 NG/ML (ref 4.78–24.2)
HBA1C MFR BLD: 5.9 % (ref 4.8–5.6)
T4 FREE SERPL-MCNC: 1.36 NG/DL (ref 0.93–1.7)
TSH SERPL DL<=0.05 MIU/L-ACNC: 1.32 UIU/ML (ref 0.27–4.2)
VIT B12 BLD-MCNC: 210 PG/ML (ref 211–946)

## 2022-08-03 NOTE — PROGRESS NOTES
Please call the patient regarding her abnormal result.  B12 is very low.  She can come in for shots weekly for a month and then biweekly.  Can send vial and syringes at home if someone can give them there.  Blood sugar was good, cholesterol good except triglycerides have gone up just a bit, kidney functions liver enzymes and electrolytes are good

## 2022-08-09 DIAGNOSIS — E53.8 B12 DEFICIENCY: Primary | ICD-10-CM

## 2022-08-09 RX ORDER — CYANOCOBALAMIN 1000 UG/ML
1000 INJECTION, SOLUTION INTRAMUSCULAR; SUBCUTANEOUS WEEKLY
Status: SHIPPED | OUTPATIENT
Start: 2022-08-11 | End: 2022-09-08

## 2022-08-09 RX ORDER — CYANOCOBALAMIN 1000 UG/ML
1000 INJECTION, SOLUTION INTRAMUSCULAR; SUBCUTANEOUS
Status: SHIPPED | OUTPATIENT
Start: 2022-09-12

## 2022-08-11 ENCOUNTER — CLINICAL SUPPORT (OUTPATIENT)
Dept: FAMILY MEDICINE CLINIC | Facility: CLINIC | Age: 57
End: 2022-08-11

## 2022-08-11 DIAGNOSIS — E53.8 B12 DEFICIENCY: ICD-10-CM

## 2022-08-11 PROCEDURE — 96372 THER/PROPH/DIAG INJ SC/IM: CPT | Performed by: FAMILY MEDICINE

## 2022-08-11 RX ADMIN — CYANOCOBALAMIN 1000 MCG: 1000 INJECTION, SOLUTION INTRAMUSCULAR; SUBCUTANEOUS at 11:41

## 2022-08-18 ENCOUNTER — CLINICAL SUPPORT (OUTPATIENT)
Dept: FAMILY MEDICINE CLINIC | Facility: CLINIC | Age: 57
End: 2022-08-18

## 2022-08-18 DIAGNOSIS — R52 PAIN: Primary | ICD-10-CM

## 2022-08-18 DIAGNOSIS — E53.8 VITAMIN B12 DEFICIENCY: ICD-10-CM

## 2022-08-18 PROCEDURE — 96372 THER/PROPH/DIAG INJ SC/IM: CPT | Performed by: FAMILY MEDICINE

## 2022-08-18 RX ADMIN — CYANOCOBALAMIN 1000 MCG: 1000 INJECTION, SOLUTION INTRAMUSCULAR; SUBCUTANEOUS at 11:07

## 2022-09-19 DIAGNOSIS — M51.27 LUMBAGO-SCIATICA DUE TO DISPLACEMENT OF LUMBAR INTERVERTEBRAL DISC: ICD-10-CM

## 2022-09-20 RX ORDER — PREGABALIN 150 MG/1
150 CAPSULE ORAL 3 TIMES DAILY
Qty: 90 CAPSULE | Refills: 0 | Status: SHIPPED | OUTPATIENT
Start: 2022-09-20 | End: 2022-10-18

## 2022-10-06 ENCOUNTER — OFFICE VISIT (OUTPATIENT)
Dept: CARDIOLOGY | Facility: CLINIC | Age: 57
End: 2022-10-06

## 2022-10-06 VITALS
HEIGHT: 60 IN | BODY MASS INDEX: 24.58 KG/M2 | SYSTOLIC BLOOD PRESSURE: 128 MMHG | HEART RATE: 63 BPM | OXYGEN SATURATION: 98 % | WEIGHT: 125.2 LBS | DIASTOLIC BLOOD PRESSURE: 84 MMHG

## 2022-10-06 DIAGNOSIS — R01.1 SYSTOLIC MURMUR: Primary | ICD-10-CM

## 2022-10-06 PROCEDURE — 99214 OFFICE O/P EST MOD 30 MIN: CPT | Performed by: INTERNAL MEDICINE

## 2022-10-06 PROCEDURE — 93000 ELECTROCARDIOGRAM COMPLETE: CPT | Performed by: INTERNAL MEDICINE

## 2022-10-06 RX ORDER — OXYCODONE AND ACETAMINOPHEN 7.5; 325 MG/1; MG/1
TABLET ORAL
COMMUNITY
Start: 2022-09-12

## 2022-10-06 RX ORDER — MEMANTINE HYDROCHLORIDE 5 MG/1
TABLET ORAL 2 TIMES DAILY
COMMUNITY

## 2022-10-06 NOTE — PROGRESS NOTES
[facility]  OFFICE NOTE    Cardiovascular Medicine  Harrison Paredes M.D., RPVI         No referring provider defined for this encounter.    Thank you for asking me to see Isabel Villalba for syncope.    This is a 57 y.o. female with:    1.  Hypertension  2.  Hyperlipidemia  3.  Systolic murmur  4.  COPD   5.  active tobacco user    Isabel Villalba is a 57 y.o. female who presents for consultation today.  Couple of weeks ago she had an episode when she was walking outside, she got very hot and subsequently passed out for about 2 minutes.  She was witnessed by her family member and turned pale at the time.  She was tired.  No reports of tongue biting, fecal or urinary incontinence and no seizures.  She is also been having some back pain with exertion.    10/06/2022:  No acute issues since last visit.  No further episodes of syncope.    07/01/2021;  No acute issues since last visit.  No further episodes of syncope.          Review of Systems - ROS  Constitution: Negative for weakness, weight gain and weight loss.   HENT: Negative for congestion.    Eyes: Negative for blurred vision.   Cardiovascular: As mentioned above  Respiratory: Negative for cough and hemoptysis.    Endocrine: Negative for polydipsia and polyuria.   Hematologic/Lymphatic: Negative for bleeding problem. Does not bruise/bleed easily.   Skin: Negative for flushing.   Musculoskeletal: Negative for neck pain and stiffness.   Gastrointestinal: Negative for abdominal pain, diarrhea, jaundice, melena, nausea and vomiting.   Genitourinary: Negative for dysuria and hematuria.   Neurological: Negative for dizziness, focal weakness and numbness.   Psychiatric/Behavioral: Negative for altered mental status and depression.     I reviewed the ROS as documented here and confirmed the accuracy of it with the patient today. 10/6/2022        All other systems were reviewed and were negative.    family history includes ADD / ADHD  in an other family member; Bipolar disorder in an other family member; Breast cancer in her father and paternal grandmother; Cancer in her father and another family member; Coronary artery disease in an other family member; Diabetes in an other family member; Heart disease in an other family member; Hyperlipidemia in an other family member; Hypertension in her mother and another family member; Lung cancer in an other family member; Migraines in an other family member; Other in an other family member; Polycystic ovary syndrome in an other family member; Schizophrenia in an other family member; Stroke in an other family member; Tuberculosis in an other family member.     reports that she has been smoking cigarettes. She has been smoking about 0.50 packs per day. She has never used smokeless tobacco. She reports that she does not drink alcohol and does not use drugs.    Allergies   Allergen Reactions   • Morphine And Related Nausea And Vomiting         Current Outpatient Medications:   •  albuterol sulfate HFA (Ventolin HFA) 108 (90 Base) MCG/ACT inhaler, Inhale 2 puffs Every 6 (Six) Hours As Needed for Wheezing or Shortness of Air., Disp: 18 g, Rfl: 5  •  amLODIPine-benazepril (LOTREL 5-20) 5-20 MG per capsule, Take 1 capsule by mouth Daily., Disp: 90 capsule, Rfl: 5  •  atomoxetine (STRATTERA) 25 MG capsule, TAKE 1 CAPSULE BY MOUTH DAILY., Disp: 90 capsule, Rfl: 5  •  atorvastatin (LIPITOR) 10 MG tablet, TAKE 1 TABLET BY MOUTH DAILY., Disp: 90 tablet, Rfl: 5  •  benzonatate (TESSALON) 200 MG capsule, Take 1 capsule by mouth 3 (Three) Times a Day As Needed for Cough., Disp: 30 capsule, Rfl: 2  •  denosumab (Prolia) 60 MG/ML solution prefilled syringe syringe, Inject 1 mL under the skin into the appropriate area as directed Every 6 (Six) Months., Disp: 1 mL, Rfl: 1  •  Evolocumab (Repatha SureClick) solution auto-injector SureClick injection, Inject 1 mL under the skin into the appropriate area as directed Every 14  "(Fourteen) Days., Disp: 2 mL, Rfl: 5  •  fluticasone (FLONASE) 50 MCG/ACT nasal spray, 2 sprays by Each Nare route Daily., Disp: 16 g, Rfl: 5  •  glucose blood test strip, Patient is testing 2xs daily, Disp: 100 each, Rfl: 12  •  glucose monitor monitoring kit, 1 each 2 (two) times a day. Patient is testing 2 xs daily, Disp: 1 each, Rfl: 0  •  guaiFENesin (MUCINEX) 600 MG 12 hr tablet, Take 1 tablet by mouth 2 (Two) Times a Day. (Patient taking differently: Take 600 mg by mouth As Needed.), Disp: 14 tablet, Rfl: 0  •  Lancets Misc. misc, Patient is testing 2 xs daily, Disp: 100 each, Rfl: 11  •  omeprazole (priLOSEC) 20 MG capsule, Take 1 capsule by mouth Daily., Disp: 60 capsule, Rfl: 5  •  Ozempic, 0.25 or 0.5 MG/DOSE, 2 MG/1.5ML solution pen-injector, INJECT 0.5 MG UNDER THE SKIN INTO THE APPROPRIATE AREA AS DIRECTED 1 (ONE) TIME PER WEEK., Disp: 1.5 mL, Rfl: 5  •  pregabalin (LYRICA) 150 MG capsule, TAKE 1 CAPSULE BY MOUTH 3 (THREE) TIMES A DAY., Disp: 90 capsule, Rfl: 0  •  tiZANidine (ZANAFLEX) 4 MG tablet, Take 1 tablet by mouth Every 8 (Eight) Hours As Needed for Muscle Spasms., Disp: 270 tablet, Rfl: 5  •  vitamin D (ERGOCALCIFEROL) 1.25 MG (24558 UT) capsule capsule, Take 1 capsule by mouth 2 (Two) Times a Week., Disp: 8 capsule, Rfl: 5  •  memantine (NAMENDA) 5 MG tablet, 2 (Two) Times a Day., Disp: , Rfl:   •  oxyCODONE-acetaminophen (PERCOCET) 7.5-325 MG per tablet, TAKE ONE TABLET BY MOUTH FOUR TIMES DAY AS NEEDED, Disp: , Rfl:     Current Facility-Administered Medications:   •  cyanocobalamin injection 1,000 mcg, 1,000 mcg, Intramuscular, Q14 Days, Radha Simental MD, 1,000 mcg at 07/07/21 1421  •  cyanocobalamin injection 1,000 mcg, 1,000 mcg, Intramuscular, Q14 Days, Radha Simental MD    Physical Exam:  Vitals:    10/06/22 1037   BP: 128/84   BP Location: Left arm   Patient Position: Sitting   Cuff Size: Adult   Pulse: 63   SpO2: 98%   Weight: 56.8 kg (125 lb 3.2 oz)   Height: 152.4 cm (60\") "   PainSc: 0-No pain   PainLoc: Chest     Current Pain Level: none  Pulse Ox: Normal  on room air  General: alert, appears stated age and cooperative     Body Habitus: well-nourished    HEENT: Head: Normocephalic, no lesions, without obvious abnormality. No arcus senilis, xanthelasma or xanthomas.    Neuro: alert, oriented x3  Pulses: 2+ and symmetric  JVP: Volume/Pulsation: Normal.  Normal waveforms.   Appropriate inspiratory decrease.  No Kussmaul's. No Guanaco's.   Carotid Exam: no bruit normal pulsation bilaterally   Carotid Volume: normal.     Respirations: no increased work of breathing   Chest:  Normal    Pulmonary:Normal   Precordium: Normal impulses. P2 is not palpable.  RV Heave: absent  LV Heave: absent  [place]:  normal size and placement  Palpable S4: absent.  Heart rate: normal    Heart Rhythm: regular     Heart Sounds: S1: normal  S2: normal  S3: absent   S4: absent  Opening Snap: absent    Pericardial Rub:  Absent: .    Abdomen:   Appearance: normal .  Palpation: Soft, non-tender to palpation, bowel sounds positive in all four quadrants; no guarding or rebound tenderness  Extremity: no edema.   LE Skin: no rashes  LE Hair:  normal  LE Pulses: well perfused with normal pulses in the distal extremities  Pallor on elevation: Absent. Rubor on dependency: None    I have reexamined the patient and the results are consistent with the previously documented exam. Harrison Paredes MD     DATA REVIEWED:     EKG. I personally reviewed and interpreted the EKG.  Sinus rhythm with sinus arrhythmia and sinus exit block.    ECG/EMG Results (all)     Procedure Component Value Units Date/Time    ECG 12 Lead [[id number]] Collected: 10/22/20 1002     Updated: 10/22/20 0958    Narrative:      Test Reason : syncope/ hypertension  Blood Pressure : **/** mmHG  Vent. Rate : 065 BPM     Atrial Rate : 065 BPM     P-R Int : 156 ms          QRS Dur : 100 ms      QT Int : 422 ms       P-R-T Axes : 045 064 064 degrees     QTc Int : 438  ms    Sinus rhythm with marked sinus arrhythmia  Otherwise normal ECG  No previous ECGs available    Referred By:             Confirmed By:         ---------------------------------------------------  TTE/RAQUEL:  Results for orders placed during the hospital encounter of 08/06/19    Adult Transthoracic Echo Complete W/ Cont if Necessary Per Protocol    Interpretation Summary  · Estimated EF appears to be in the range of 56 - 60%  · Left ventricular systolic function is normal.  · Left ventricular diastolic dysfunction (grade I) consistent with impaired relaxation.        --------------------------------------------------------------------------------------------------  LABS:     The 10-year CVD risk score (Manuelito et al., 2008) is: 52.1%    Values used to calculate the score:      Age: 55 years      Sex: Female      Diabetic: Yes      Tobacco smoker: Yes      Systolic Blood Pressure: 145 mmHg      Is BP treated: Yes      HDL Cholesterol: 39 mg/dL      Total Cholesterol: 280 mg/dL         Lab Results   Component Value Date    GLUCOSE 91 08/02/2022    BUN 9 08/02/2022    CREATININE 0.77 08/02/2022    EGFRIFNONA 80 07/01/2021    BCR 11.7 08/02/2022    K 4.1 08/02/2022    CO2 28.0 08/02/2022    CALCIUM 9.2 08/02/2022    ALBUMIN 4.30 08/02/2022    AST 19 08/02/2022    ALT 11 08/02/2022     Lab Results   Component Value Date    WBC 9.85 08/02/2022    HGB 13.7 08/02/2022    HCT 41.8 08/02/2022    MCV 88.7 08/02/2022     08/02/2022     Lab Results   Component Value Date    CHOL 148 (L) 08/02/2022    TRIG 239 (H) 08/02/2022    HDL 41 08/02/2022    LDL 68 08/02/2022     Lab Results   Component Value Date    TSH 1.320 08/02/2022     No results found for: CKTOTAL, CKMB, CKMBINDEX, TROPONINI, TROPONINT  Lab Results   Component Value Date    HGBA1C 5.90 (H) 08/02/2022     No results found for: DDIMER  Lab Results   Component Value Date    ALT 11 08/02/2022     Lab Results   Component Value Date    HGBA1C 5.90 (H)  08/02/2022    HGBA1C 6.19 (H) 07/01/2021    HGBA1C 6.72 (H) 11/06/2020     Lab Results   Component Value Date    CREATININE 0.77 08/02/2022     No results found for: IRON, TIBC, FERRITIN  No results found for: INR, PROTIME    Assessment/Plan     1.  Syncope: Resolved  EKG in office today with sinus arrhythmia and potential sinus exit block.  Echocardiogram from July 2019 with EF of 56 to 60% grade 1 diastolic dysfunction.  No significant valvular disease.  Recent TSH was normal in July  Electrolytes are okay.  Heart monitor showed intermittent sinus exit block mostly at night due to high vagal tone.  However this will not cause her syncope.  Currently her symptoms have resolved.  If patient were to have recurrent syncopal episode will consider loop recorder versus referral to EP for an EP study and consideration for pacemaker.  Avoid any AV comfort blocking agents.  No carotid bruits audible.  She was also complaining of back pain with exertion, Nuc stress was without any evidence of ischemia    2.  Hypertension:  She is on combination of amlodipine/benazepril 5/20    3.  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  She has been started on Repatha.  LDL is improved to 68 from 196.    4.  Diabetes: I be managed by primary care physician.    Prevention:  Patient's Body mass index is 24.45 kg/m². BMI is within normal parameters. No follow-up required..      Isabel Villalba  reports that she has been smoking cigarettes. She has been smoking about 0.50 packs per day. She has never used smokeless tobacco.. I have educated her on the risk of diseases from using tobacco products such as cancer, COPD and heart disease.     I advised her to quit and she is not willing to quit.    I spent 3  minutes counseling the patient.            This document has been electronically signed by Harrison Paredes MD on October 6, 2022 10:49 CDT

## 2022-10-16 LAB
QT INTERVAL: 428 MS
QTC INTERVAL: 437 MS

## 2022-10-18 DIAGNOSIS — M51.27 LUMBAGO-SCIATICA DUE TO DISPLACEMENT OF LUMBAR INTERVERTEBRAL DISC: ICD-10-CM

## 2022-10-18 RX ORDER — PREGABALIN 150 MG/1
150 CAPSULE ORAL 3 TIMES DAILY
Qty: 90 CAPSULE | Refills: 0 | Status: SHIPPED | OUTPATIENT
Start: 2022-10-18 | End: 2022-11-10

## 2022-10-24 NOTE — PROGRESS NOTES
06/06/2022: Prolia 60mg/ml injection NDC# [address]-02 was refilled at [facility] and brought into the office to be administered. Expiration date and lot number was not documented and [facility] [place] only has documention of refill date and ndc. Patient tolerated well, Sravani Zacarias LPN.

## 2022-10-25 DIAGNOSIS — E11.69 TYPE 2 DIABETES MELLITUS WITH OTHER SPECIFIED COMPLICATION, WITHOUT LONG-TERM CURRENT USE OF INSULIN: ICD-10-CM

## 2022-10-25 RX ORDER — SEMAGLUTIDE 1.34 MG/ML
0.5 INJECTION, SOLUTION SUBCUTANEOUS WEEKLY
Qty: 1.5 ML | Refills: 5 | Status: SHIPPED | OUTPATIENT
Start: 2022-10-25

## 2022-11-10 DIAGNOSIS — M51.27 LUMBAGO-SCIATICA DUE TO DISPLACEMENT OF LUMBAR INTERVERTEBRAL DISC: ICD-10-CM

## 2022-11-10 RX ORDER — PREGABALIN 150 MG/1
150 CAPSULE ORAL 3 TIMES DAILY
Qty: 90 CAPSULE | Refills: 0 | Status: SHIPPED | OUTPATIENT
Start: 2022-11-10 | End: 2023-01-16

## 2022-11-23 ENCOUNTER — OFFICE VISIT (OUTPATIENT)
Dept: FAMILY MEDICINE CLINIC | Facility: CLINIC | Age: 57
End: 2022-11-23

## 2022-11-23 VITALS
OXYGEN SATURATION: 98 % | WEIGHT: 124.8 LBS | BODY MASS INDEX: 24.5 KG/M2 | TEMPERATURE: 98.2 F | HEIGHT: 60 IN | SYSTOLIC BLOOD PRESSURE: 124 MMHG | HEART RATE: 74 BPM | RESPIRATION RATE: 16 BRPM | DIASTOLIC BLOOD PRESSURE: 82 MMHG

## 2022-11-23 DIAGNOSIS — Z00.00 ENCOUNTER FOR SUBSEQUENT ANNUAL WELLNESS VISIT (AWV) IN MEDICARE PATIENT: Primary | ICD-10-CM

## 2022-11-23 DIAGNOSIS — M65.331 TRIGGER MIDDLE FINGER OF RIGHT HAND: ICD-10-CM

## 2022-11-23 PROCEDURE — 99214 OFFICE O/P EST MOD 30 MIN: CPT | Performed by: FAMILY MEDICINE

## 2022-11-23 RX ORDER — MELOXICAM 15 MG/1
15 TABLET ORAL DAILY
Qty: 30 TABLET | Refills: 5 | Status: SHIPPED | OUTPATIENT
Start: 2022-11-23

## 2022-11-23 NOTE — PROGRESS NOTES
The ABCs of the Annual Wellness Visit  Subsequent Medicare Wellness Visit    Chief Complaint   Patient presents with   • Medicare Wellness-subsequent      Subjective    History of Present Illness:  Isabel Villalba is a 57 y.o. female who presents for a Subsequent Medicare Wellness Visit.    The following portions of the patient's history were reviewed and   updated as appropriate: allergies, current medications, past family history, past medical history, past social history, past surgical history and problem list.    Compared to one year ago, the patient feels her physical   health is the same.    Compared to one year ago, the patient feels her mental   health is the same.    Recent Hospitalizations:  She was not admitted to the hospital during the last year.       Current Medical Providers:  Patient Care Team:  Radha Simental MD as PCP - General (Family Medicine)    Outpatient Medications Prior to Visit   Medication Sig Dispense Refill   • albuterol sulfate HFA (Ventolin HFA) 108 (90 Base) MCG/ACT inhaler Inhale 2 puffs Every 6 (Six) Hours As Needed for Wheezing or Shortness of Air. 18 g 5   • amLODIPine-benazepril (LOTREL 5-20) 5-20 MG per capsule Take 1 capsule by mouth Daily. 90 capsule 5   • atomoxetine (STRATTERA) 25 MG capsule TAKE 1 CAPSULE BY MOUTH DAILY. 90 capsule 5   • atorvastatin (LIPITOR) 10 MG tablet TAKE 1 TABLET BY MOUTH DAILY. 90 tablet 5   • benzonatate (TESSALON) 200 MG capsule Take 1 capsule by mouth 3 (Three) Times a Day As Needed for Cough. 30 capsule 2   • denosumab (Prolia) 60 MG/ML solution prefilled syringe syringe Inject 1 mL under the skin into the appropriate area as directed Every 6 (Six) Months. 1 mL 1   • Evolocumab (Repatha SureClick) solution auto-injector SureClick injection Inject 1 mL under the skin into the appropriate area as directed Every 14 (Fourteen) Days. 2 mL 5   • fluticasone (FLONASE) 50 MCG/ACT nasal spray 2 sprays by Each Nare route Daily. 16 g 5   • glucose  blood test strip Patient is testing 2xs daily 100 each 12   • glucose monitor monitoring kit 1 each 2 (two) times a day. Patient is testing 2 xs daily 1 each 0   • guaiFENesin (MUCINEX) 600 MG 12 hr tablet Take 1 tablet by mouth 2 (Two) Times a Day. (Patient taking differently: Take 600 mg by mouth As Needed.) 14 tablet 0   • Lancets Misc. misc Patient is testing 2 xs daily 100 each 11   • memantine (NAMENDA) 5 MG tablet 2 (Two) Times a Day.     • omeprazole (priLOSEC) 20 MG capsule Take 1 capsule by mouth Daily. 60 capsule 5   • oxyCODONE-acetaminophen (PERCOCET) 7.5-325 MG per tablet TAKE ONE TABLET BY MOUTH FOUR TIMES DAY AS NEEDED     • Ozempic, 0.25 or 0.5 MG/DOSE, 2 MG/1.5ML solution pen-injector INJECT 0.5 MG UNDER THE SKIN INTO THE APPROPRIATE AREA AS DIRECTED 1 (ONE) TIME PER WEEK. 1.5 mL 5   • pregabalin (LYRICA) 150 MG capsule TAKE 1 CAPSULE BY MOUTH 3 (THREE) TIMES A DAY. 90 capsule 0   • tiZANidine (ZANAFLEX) 4 MG tablet Take 1 tablet by mouth Every 8 (Eight) Hours As Needed for Muscle Spasms. 270 tablet 5   • vitamin D (ERGOCALCIFEROL) 1.25 MG (15600 UT) capsule capsule Take 1 capsule by mouth 2 (Two) Times a Week. 8 capsule 5     Facility-Administered Medications Prior to Visit   Medication Dose Route Frequency Provider Last Rate Last Admin   • cyanocobalamin injection 1,000 mcg  1,000 mcg Intramuscular Q14 Days Radha Simental MD   1,000 mcg at 07/07/21 1421   • cyanocobalamin injection 1,000 mcg  1,000 mcg Intramuscular Q14 Days Radha Simental MD           Opioid medication/s are on active medication list.  and I have evaluated her active treatment plan and pain score trends (see table).  Vitals:    11/23/22 0853   PainSc:   8   PainLoc: Generalized     I have reviewed the chart for potential of high risk medication and harmful drug interactions in the elderly.            Aspirin is not on active medication list.  Aspirin use is not indicated based on review of current medical condition/s. Risk  "of harm outweighs potential benefits.  .    Patient Active Problem List   Diagnosis   • Vitamin B12 deficiency   • Systolic murmur   • Pruritic rash   • Protrusion of cervical intervertebral disc   • Lumbago-sciatica due to displacement of lumbar intervertebral disc   • Hyperlipidemia   • Gouty arthropathy   • Gastritis   • Gastroesophageal reflux disease   • Essential hypertension   • Vitamin D deficiency   • Osteoporosis without current pathological fracture   • Acute pain of right shoulder   • Rotator cuff syndrome of left shoulder   • Subacromial bursitis of right shoulder joint     Advance Care Planning  Advance Directive is not on file.  ACP discussion was held with the patient during this visit. Patient does not have an advance directive, information provided.    Review of Systems   Constitutional: Negative.    HENT: Negative.    Eyes: Negative.    Respiratory: Negative.    Cardiovascular: Negative.    Endocrine: Negative.    Musculoskeletal: Negative.         Patient has pain in the right palm and is having difficulty at times extending the middle finger as it seems to get stuck.   Neurological: Negative.    Hematological: Negative.    All other systems reviewed and are negative.       Objective    Vitals:    11/23/22 0853   BP: 124/82   BP Location: Left arm   Patient Position: Sitting   Cuff Size: Adult   Pulse: 74   Resp: 16   Temp: 98.2 °F (36.8 °C)   SpO2: 98%   Weight: 56.6 kg (124 lb 12.8 oz)   Height: 152.4 cm (60\")   PainSc:   8   PainLoc: Generalized     Estimated body mass index is 24.37 kg/m² as calculated from the following:    Height as of this encounter: 152.4 cm (60\").    Weight as of this encounter: 56.6 kg (124 lb 12.8 oz).    BMI is within normal parameters. No other follow-up for BMI required.    Does the patient have evidence of cognitive impairment? No    Physical Exam  Vitals and nursing note reviewed.   Constitutional:       Appearance: She is well-developed.   HENT:      Head: " Normocephalic and atraumatic.      Nose: Nose normal.   Eyes:      Conjunctiva/sclera: Conjunctivae normal.      Pupils: Pupils are equal, round, and reactive to light.   Neck:      Thyroid: No thyromegaly.      Vascular: No JVD.      Trachea: No tracheal deviation.   Cardiovascular:      Rate and Rhythm: Normal rate and regular rhythm.      Heart sounds: Normal heart sounds. No murmur heard.  Pulmonary:      Effort: Pulmonary effort is normal.      Breath sounds: Normal breath sounds. No wheezing.   Abdominal:      General: Bowel sounds are normal. There is no distension.      Palpations: Abdomen is soft.      Tenderness: There is no abdominal tenderness.   Musculoskeletal:         General: Normal range of motion.      Cervical back: Normal range of motion and neck supple.      Comments: Tenderness over the right palmar side of the MCP joint with mild trigger finger noted   Lymphadenopathy:      Cervical: No cervical adenopathy.   Skin:     General: Skin is warm and dry.      Findings: No rash.   Neurological:      Mental Status: She is alert and oriented to person, place, and time.      Coordination: Coordination normal.                 HEALTH RISK ASSESSMENT    Smoking Status:  Social History     Tobacco Use   Smoking Status Every Day   • Packs/day: 0.50   • Types: Cigarettes   Smokeless Tobacco Never     Alcohol Consumption:  Social History     Substance and Sexual Activity   Alcohol Use No     Fall Risk Screen:    [facility] Fall Risk Assessment has not been completed.    Depression Screening:  PHQ-2/PHQ-9 Depression Screening 11/23/2022   Retired PHQ-9 Total Score -   Retired Total Score -   Little Interest or Pleasure in Doing Things 0-->not at all   Feeling Down, Depressed or Hopeless 0-->not at all   PHQ-9: Brief Depression Severity Measure Score 0       Health Habits and Functional and Cognitive Screening:  Functional & Cognitive Status 11/23/2022   Do you have difficulty preparing food and eating? No   Do you  have difficulty bathing yourself, getting dressed or grooming yourself? No   Do you have difficulty using the toilet? No   Do you have difficulty moving around from place to place? No   Do you have trouble with steps or getting out of a bed or a chair? No   Current Diet Well Balanced Diet   Dental Exam Up to date   Eye Exam Not up to date   Exercise (times per week) 0 times per week   Current Exercises Include No Regular Exercise   Do you need help using the phone?  No   Are you deaf or do you have serious difficulty hearing?  No   Do you need help with transportation? No   Do you need help shopping? No   Do you need help preparing meals?  No   Do you need help with housework?  No   Do you need help with laundry? No   Do you need help taking your medications? No   Do you need help managing money? No   Do you ever drive or ride in a car without wearing a seat belt? No   Have you felt unusual stress, anger or loneliness in the last month? No   Who do you live with? Spouse   If you need help, do you have trouble finding someone available to you? No   Have you been bothered in the last four weeks by sexual problems? No   Do you have difficulty concentrating, remembering or making decisions? No       Age-appropriate Screening Schedule:  Refer to the list below for future screening recommendations based on patient's age, sex and/or medical conditions. Orders for these recommended tests are listed in the plan section. The patient has been provided with a written plan.    Health Maintenance   Topic Date Due   • URINE MICROALBUMIN  Never done   • DIABETIC EYE EXAM  Never done   • DIABETIC FOOT EXAM  08/06/2021   • INFLUENZA VACCINE  08/01/2022   • ZOSTER VACCINE (1 of 2) 07/10/2023 (Originally 8/21/2014)   • HEMOGLOBIN A1C  02/02/2023   • LIPID PANEL  08/02/2023   • PAP SMEAR  10/13/2023   • DXA SCAN  12/29/2023   • MAMMOGRAM  08/11/2024   • TDAP/TD VACCINES (3 - Td or Tdap) 03/14/2027              Assessment & Plan   CMS  Preventative Services Quick Reference  Risk Factors Identified During Encounter  Chronic Pain   The above risks/problems have been discussed with the patient.  Follow up actions/plans if indicated are seen below in the Assessment/Plan Section.  Pertinent information has been shared with the patient in the After Visit Summary.    Diagnoses and all orders for this visit:    1. Encounter for subsequent annual wellness visit (AWV) in Medicare patient (Primary)    2. Trigger middle finger of right hand    Other orders  -     meloxicam (MOBIC) 15 MG tablet; Take 1 tablet by mouth Daily.  Dispense: 30 tablet; Refill: 5    Will give meloxicam for arthritis in the hand/trigger finger.  Discussed referral to orthopedics if she decides she would like an injection or further evaluation    Follow Up:   Return in about 3 months (around 2/23/2023) for Recheck.     An After Visit Summary and PPPS were made available to the patient.          I spent 31 minutes caring for Isabel on this date of service. This time includes time spent by me in the following activities:preparing for the visit, reviewing tests, obtaining and/or reviewing a separately obtained history, counseling and educating the patient/family/caregiver, ordering medications, tests, or procedures and documenting information in the medical record

## 2022-12-05 ENCOUNTER — PRIOR AUTHORIZATION (OUTPATIENT)
Dept: FAMILY MEDICINE CLINIC | Facility: CLINIC | Age: 57
End: 2022-12-05

## 2022-12-05 NOTE — TELEPHONE ENCOUNTER
LAKISHA EDWARDS (Fernandez: [id number]) - [id number]  Repatha SureClick 140MG/ML auto-injectors     Status: PA Response - Approved  Created: December 2nd, 2022

## 2022-12-20 ENCOUNTER — CLINICAL SUPPORT (OUTPATIENT)
Dept: FAMILY MEDICINE CLINIC | Facility: CLINIC | Age: 57
End: 2022-12-20

## 2022-12-20 DIAGNOSIS — M81.0 AGE-RELATED OSTEOPOROSIS WITHOUT CURRENT PATHOLOGICAL FRACTURE: Primary | ICD-10-CM

## 2022-12-20 PROCEDURE — 96372 THER/PROPH/DIAG INJ SC/IM: CPT | Performed by: FAMILY MEDICINE

## 2023-01-16 DIAGNOSIS — M51.27 LUMBAGO-SCIATICA DUE TO DISPLACEMENT OF LUMBAR INTERVERTEBRAL DISC: ICD-10-CM

## 2023-01-16 RX ORDER — PREGABALIN 150 MG/1
150 CAPSULE ORAL 3 TIMES DAILY
Qty: 90 CAPSULE | Refills: 0 | Status: SHIPPED | OUTPATIENT
Start: 2023-01-16 | End: 2023-02-13

## 2023-02-13 DIAGNOSIS — M51.27 LUMBAGO-SCIATICA DUE TO DISPLACEMENT OF LUMBAR INTERVERTEBRAL DISC: ICD-10-CM

## 2023-02-13 RX ORDER — PREGABALIN 150 MG/1
150 CAPSULE ORAL 3 TIMES DAILY
Qty: 90 CAPSULE | Refills: 0 | Status: SHIPPED | OUTPATIENT
Start: 2023-02-13 | End: 2023-03-13

## 2023-03-13 DIAGNOSIS — M51.27 LUMBAGO-SCIATICA DUE TO DISPLACEMENT OF LUMBAR INTERVERTEBRAL DISC: ICD-10-CM

## 2023-03-13 RX ORDER — PREGABALIN 150 MG/1
150 CAPSULE ORAL 3 TIMES DAILY
Qty: 90 CAPSULE | Refills: 0 | Status: SHIPPED | OUTPATIENT
Start: 2023-03-13

## 2023-03-22 DIAGNOSIS — E55.9 VITAMIN D DEFICIENCY: ICD-10-CM

## 2023-03-22 DIAGNOSIS — E11.69 TYPE 2 DIABETES MELLITUS WITH OTHER SPECIFIED COMPLICATION, WITHOUT LONG-TERM CURRENT USE OF INSULIN: ICD-10-CM

## 2023-03-22 DIAGNOSIS — I10 ESSENTIAL HYPERTENSION: Primary | ICD-10-CM

## 2023-03-22 DIAGNOSIS — E53.8 VITAMIN B12 DEFICIENCY: ICD-10-CM

## 2023-03-28 ENCOUNTER — LAB (OUTPATIENT)
Dept: LAB | Facility: OTHER | Age: 58
End: 2023-03-28
Payer: MEDICARE

## 2023-03-28 DIAGNOSIS — E11.69 TYPE 2 DIABETES MELLITUS WITH OTHER SPECIFIED COMPLICATION, WITHOUT LONG-TERM CURRENT USE OF INSULIN: ICD-10-CM

## 2023-03-28 DIAGNOSIS — E53.8 VITAMIN B12 DEFICIENCY: ICD-10-CM

## 2023-03-28 DIAGNOSIS — E55.9 VITAMIN D DEFICIENCY: ICD-10-CM

## 2023-03-28 DIAGNOSIS — I10 ESSENTIAL HYPERTENSION: ICD-10-CM

## 2023-03-28 LAB
ALBUMIN SERPL-MCNC: 3.4 G/DL (ref 3.5–5)
ALBUMIN/GLOB SERPL: 0.8 G/DL (ref 1.1–1.8)
ALP SERPL-CCNC: 106 U/L (ref 38–126)
ALT SERPL W P-5'-P-CCNC: 11 U/L
ANION GAP SERPL CALCULATED.3IONS-SCNC: 8 MMOL/L (ref 5–15)
ANISOCYTOSIS BLD QL: ABNORMAL
AST SERPL-CCNC: 13 U/L (ref 14–36)
BILIRUB SERPL-MCNC: 0.3 MG/DL (ref 0.2–1.3)
BUN SERPL-MCNC: 6 MG/DL (ref 7–23)
BUN/CREAT SERPL: 10.7 (ref 7–25)
CALCIUM SPEC-SCNC: 9.2 MG/DL (ref 8.4–10.2)
CHLORIDE SERPL-SCNC: 103 MMOL/L (ref 101–112)
CO2 SERPL-SCNC: 29 MMOL/L (ref 22–30)
CREAT SERPL-MCNC: 0.56 MG/DL (ref 0.52–1.04)
DEPRECATED RDW RBC AUTO: 48.8 FL (ref 37–54)
EGFRCR SERPLBLD CKD-EPI 2021: 105.9 ML/MIN/1.73
EOSINOPHIL # BLD MANUAL: 0.13 10*3/MM3 (ref 0–0.4)
EOSINOPHIL NFR BLD MANUAL: 1 % (ref 0.3–6.2)
ERYTHROCYTE [DISTWIDTH] IN BLOOD BY AUTOMATED COUNT: 15.9 % (ref 12.3–15.4)
GLOBULIN UR ELPH-MCNC: 4.3 GM/DL (ref 2.3–3.5)
GLUCOSE SERPL-MCNC: 117 MG/DL (ref 70–99)
HCT VFR BLD AUTO: 29.1 % (ref 34–46.6)
HGB BLD-MCNC: 9.1 G/DL (ref 12–15.9)
LYMPHOCYTES # BLD MANUAL: 2.75 10*3/MM3 (ref 0.7–3.1)
LYMPHOCYTES NFR BLD MANUAL: 10 % (ref 5–12)
MCH RBC QN AUTO: 27.1 PG (ref 26.6–33)
MCHC RBC AUTO-ENTMCNC: 31.3 G/DL (ref 31.5–35.7)
MCV RBC AUTO: 86.6 FL (ref 79–97)
MONOCYTES # BLD: 1.25 10*3/MM3 (ref 0.1–0.9)
NEUTROPHILS # BLD AUTO: 8.39 10*3/MM3 (ref 1.7–7)
NEUTROPHILS NFR BLD MANUAL: 65 % (ref 42.7–76)
NEUTS BAND NFR BLD MANUAL: 2 % (ref 0–5)
PLATELET # BLD AUTO: 1044 10*3/MM3 (ref 140–450)
PMV BLD AUTO: 8.7 FL (ref 6–12)
POTASSIUM SERPL-SCNC: 3.6 MMOL/L (ref 3.4–5)
PROT SERPL-MCNC: 7.7 G/DL (ref 6.3–8.6)
RBC # BLD AUTO: 3.36 10*6/MM3 (ref 3.77–5.28)
SCAN SLIDE: NORMAL
SMALL PLATELETS BLD QL SMEAR: ABNORMAL
SODIUM SERPL-SCNC: 140 MMOL/L (ref 137–145)
VARIANT LYMPHS NFR BLD MANUAL: 22 % (ref 19.6–45.3)
WBC MORPH BLD: NORMAL
WBC NRBC COR # BLD: 12.52 10*3/MM3 (ref 3.4–10.8)

## 2023-03-28 PROCEDURE — 84439 ASSAY OF FREE THYROXINE: CPT | Performed by: FAMILY MEDICINE

## 2023-03-28 PROCEDURE — 82607 VITAMIN B-12: CPT | Performed by: FAMILY MEDICINE

## 2023-03-28 PROCEDURE — 36415 COLL VENOUS BLD VENIPUNCTURE: CPT

## 2023-03-28 PROCEDURE — 36415 COLL VENOUS BLD VENIPUNCTURE: CPT | Performed by: FAMILY MEDICINE

## 2023-03-28 PROCEDURE — 80053 COMPREHEN METABOLIC PANEL: CPT | Performed by: FAMILY MEDICINE

## 2023-03-28 PROCEDURE — 80061 LIPID PANEL: CPT | Performed by: FAMILY MEDICINE

## 2023-03-28 PROCEDURE — 85025 COMPLETE CBC W/AUTO DIFF WBC: CPT | Performed by: FAMILY MEDICINE

## 2023-03-28 PROCEDURE — 83036 HEMOGLOBIN GLYCOSYLATED A1C: CPT | Performed by: FAMILY MEDICINE

## 2023-03-28 PROCEDURE — 82306 VITAMIN D 25 HYDROXY: CPT | Performed by: FAMILY MEDICINE

## 2023-03-28 PROCEDURE — 84443 ASSAY THYROID STIM HORMONE: CPT | Performed by: FAMILY MEDICINE

## 2023-03-29 LAB
25(OH)D3 SERPL-MCNC: 41.2 NG/ML (ref 30–100)
CHOLEST SERPL-MCNC: 105 MG/DL (ref 0–200)
HBA1C MFR BLD: 6.5 % (ref 4.8–5.6)
HDLC SERPL-MCNC: 30 MG/DL (ref 40–60)
LDLC SERPL CALC-MCNC: 55 MG/DL (ref 0–100)
LDLC/HDLC SERPL: 1.77 {RATIO}
T4 FREE SERPL-MCNC: 1.08 NG/DL (ref 0.93–1.7)
TRIGL SERPL-MCNC: 109 MG/DL (ref 0–150)
TSH SERPL DL<=0.05 MIU/L-ACNC: 0.74 UIU/ML (ref 0.27–4.2)
VIT B12 BLD-MCNC: 327 PG/ML (ref 211–946)
VLDLC SERPL-MCNC: 20 MG/DL (ref 5–40)

## 2023-03-30 ENCOUNTER — OFFICE VISIT (OUTPATIENT)
Dept: FAMILY MEDICINE CLINIC | Facility: CLINIC | Age: 58
End: 2023-03-30
Payer: MEDICARE

## 2023-03-30 VITALS
DIASTOLIC BLOOD PRESSURE: 78 MMHG | HEART RATE: 70 BPM | HEIGHT: 60 IN | TEMPERATURE: 97.3 F | WEIGHT: 124 LBS | OXYGEN SATURATION: 98 % | RESPIRATION RATE: 16 BRPM | BODY MASS INDEX: 24.35 KG/M2 | SYSTOLIC BLOOD PRESSURE: 120 MMHG

## 2023-03-30 DIAGNOSIS — D75.839 THROMBOCYTOSIS: ICD-10-CM

## 2023-03-30 DIAGNOSIS — I10 ESSENTIAL HYPERTENSION: ICD-10-CM

## 2023-03-30 DIAGNOSIS — R53.1 GENERAL WEAKNESS: ICD-10-CM

## 2023-03-30 DIAGNOSIS — S36.09XA SPLENIC RUPTURE: Primary | ICD-10-CM

## 2023-03-30 DIAGNOSIS — D72.829 LEUKOCYTOSIS, UNSPECIFIED TYPE: ICD-10-CM

## 2023-03-30 RX ORDER — HYDROCODONE BITARTRATE AND ACETAMINOPHEN 10; 325 MG/1; MG/1
1 TABLET ORAL 4 TIMES DAILY PRN
COMMUNITY
Start: 2023-03-07

## 2023-03-30 RX ORDER — DOCUSATE SODIUM 100 MG/1
1 CAPSULE, LIQUID FILLED ORAL EVERY 12 HOURS SCHEDULED
COMMUNITY
Start: 2023-03-11

## 2023-03-30 RX ORDER — PANTOPRAZOLE SODIUM 40 MG/1
40 TABLET, DELAYED RELEASE ORAL
COMMUNITY
Start: 2023-03-11 | End: 2023-06-10

## 2023-03-30 RX ORDER — LIDOCAINE 50 MG/G
PATCH TOPICAL
COMMUNITY
Start: 2023-03-14 | End: 2023-04-14

## 2023-03-30 RX ORDER — ACETAMINOPHEN 500 MG
500 TABLET ORAL EVERY 6 HOURS PRN
COMMUNITY
Start: 2023-03-11

## 2023-03-30 NOTE — PROGRESS NOTES
"Subjective   Isabel Villalba is a 57 y.o. female.   With chronic low back pain, adult ADD, osteoporosis presenting today for follow-up after hospitalization and surgery for spontaneous splenic rupture.  She was just walking down her driveway when she felt the pain in the area.  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  Systolic blood pressures in the 50s at arrival to ER.  She got 6 units of blood and has still been quite weak but feels like her recovery has been good so far.  Her abdominal wound is healing.    History of Present Illness    The following portions of the patient's history were reviewed and updated as appropriate: allergies, current medications, past family history, past medical history, past social history, past surgical history and problem list.    Review of Systems   Constitutional: Positive for fatigue.   HENT: Negative for congestion, postnasal drip, rhinorrhea, sinus pressure and sore throat.    Respiratory: Negative for choking and shortness of breath.    Gastrointestinal: Negative.    Musculoskeletal: Positive for arthralgias and gait problem. Negative for myalgias.   Skin: Negative.    Allergic/Immunologic: Negative for immunocompromised state.   Neurological: Negative for tremors, seizures and syncope.   Hematological: Negative.    Psychiatric/Behavioral: Negative for agitation, decreased concentration, dysphoric mood, sleep disturbance and suicidal ideas. The patient is not nervous/anxious.    All other systems reviewed and are negative.      Objective    Body mass index is 24.22 kg/m².  Vitals:    03/30/23 0914   BP: 120/78   Pulse: 70   Resp: 16   Temp: 97.3 °F (36.3 °C)   SpO2: 98%   Weight: 56.2 kg (124 lb)   Height: 152.4 cm (60\")       Physical Exam  Vitals and nursing note reviewed.   Constitutional:       Appearance: She is well-developed.   HENT:      Head: Normocephalic and atraumatic.      Nose: Nose normal.   Eyes:      Conjunctiva/sclera: " Conjunctivae normal.      Pupils: Pupils are equal, round, and reactive to light.   Neck:      Thyroid: No thyromegaly.      Vascular: No JVD.      Trachea: No tracheal deviation.   Cardiovascular:      Rate and Rhythm: Normal rate and regular rhythm.      Heart sounds: Normal heart sounds. No murmur heard.  Pulmonary:      Effort: Pulmonary effort is normal.      Breath sounds: Normal breath sounds. No wheezing.   Abdominal:      General: Bowel sounds are normal. There is no distension.      Palpations: Abdomen is soft.      Tenderness: There is no abdominal tenderness.   Musculoskeletal:         General: Normal range of motion.      Cervical back: Normal range of motion and neck supple.   Lymphadenopathy:      Cervical: No cervical adenopathy.   Skin:     General: Skin is warm and dry.      Findings: No rash.   Neurological:      Mental Status: She is alert and oriented to person, place, and time.      Coordination: Coordination normal.       Current outpatient and discharge medications have been reconciled for the patient.  Reviewed by: Radha Simental MD      Assessment & Plan   Diagnoses and all orders for this visit:    1. Splenic rupture (Primary)  -     CBC & Differential; Standing    2. Essential hypertension    3. General weakness    4. Thrombocytosis    5. Leukocytosis, unspecified type    Reviewed hospital discharge summary and findings.  She is still anemic and in fact H&H have gone down some since she left the hospital.  We will put in a standing order for CBCs to monitor closely.  She does have an elevated white blood cell count and platelet count which are not unexpected so recently after the splenectomy and we will continue to monitor closely.  She will repeat CBC next week    Advised her to get plenty of rest and not push herself right now.  She has lost some weight and I am sure has some deconditioning.  As her blood count comes back to normal we will push her for more exercise and consider some  therapy either at home or if needed at PT but will continue to follow closely    She has gabapentin for pain and is on hydrocodone as well from pain management we will continue these.    Continue blood pressure monitoring with goal of 130/80 and continue current medications.        This document has been electronically signed by Radha Simental MD on March 30, 2023 09:45 CDT

## 2023-04-05 ENCOUNTER — TELEPHONE (OUTPATIENT)
Dept: FAMILY MEDICINE CLINIC | Facility: CLINIC | Age: 58
End: 2023-04-05
Payer: MEDICARE

## 2023-04-05 ENCOUNTER — LAB (OUTPATIENT)
Dept: LAB | Facility: OTHER | Age: 58
End: 2023-04-05
Payer: MEDICARE

## 2023-04-05 DIAGNOSIS — M51.27 LUMBAGO-SCIATICA DUE TO DISPLACEMENT OF LUMBAR INTERVERTEBRAL DISC: ICD-10-CM

## 2023-04-05 DIAGNOSIS — E78.5 HYPERLIPIDEMIA, UNSPECIFIED HYPERLIPIDEMIA TYPE: ICD-10-CM

## 2023-04-05 DIAGNOSIS — S36.09XA SPLENIC RUPTURE: ICD-10-CM

## 2023-04-05 LAB
ANISOCYTOSIS BLD QL: ABNORMAL
DEPRECATED RDW RBC AUTO: 50.3 FL (ref 37–54)
ERYTHROCYTE [DISTWIDTH] IN BLOOD BY AUTOMATED COUNT: 16.6 % (ref 12.3–15.4)
HCT VFR BLD AUTO: 26.7 % (ref 34–46.6)
HGB BLD-MCNC: 8.4 G/DL (ref 12–15.9)
HYPOCHROMIA BLD QL: ABNORMAL
LYMPHOCYTES # BLD MANUAL: 3.64 10*3/MM3 (ref 0.7–3.1)
LYMPHOCYTES NFR BLD MANUAL: 13 % (ref 5–12)
MCH RBC QN AUTO: 26.8 PG (ref 26.6–33)
MCHC RBC AUTO-ENTMCNC: 31.5 G/DL (ref 31.5–35.7)
MCV RBC AUTO: 85.3 FL (ref 79–97)
MONOCYTES # BLD: 1.75 10*3/MM3 (ref 0.1–0.9)
NEUTROPHILS # BLD AUTO: 8.09 10*3/MM3 (ref 1.7–7)
NEUTROPHILS NFR BLD MANUAL: 60 % (ref 42.7–76)
PLATELET # BLD AUTO: 880 10*3/MM3 (ref 140–450)
PMV BLD AUTO: 8.5 FL (ref 6–12)
RBC # BLD AUTO: 3.13 10*6/MM3 (ref 3.77–5.28)
SMALL PLATELETS BLD QL SMEAR: ABNORMAL
VARIANT LYMPHS NFR BLD MANUAL: 27 % (ref 19.6–45.3)
WBC MORPH BLD: NORMAL
WBC NRBC COR # BLD: 13.49 10*3/MM3 (ref 3.4–10.8)

## 2023-04-05 PROCEDURE — 85025 COMPLETE CBC W/AUTO DIFF WBC: CPT | Performed by: FAMILY MEDICINE

## 2023-04-05 PROCEDURE — 36415 COLL VENOUS BLD VENIPUNCTURE: CPT | Performed by: FAMILY MEDICINE

## 2023-04-05 RX ORDER — EVOLOCUMAB 140 MG/ML
140 INJECTION, SOLUTION SUBCUTANEOUS
Qty: 2 ML | Refills: 5 | Status: SHIPPED | OUTPATIENT
Start: 2023-04-05

## 2023-04-05 RX ORDER — PREGABALIN 150 MG/1
150 CAPSULE ORAL 3 TIMES DAILY
Qty: 90 CAPSULE | Refills: 0 | OUTPATIENT
Start: 2023-04-05

## 2023-04-05 NOTE — TELEPHONE ENCOUNTER
Spoke with Shannan GARCÍA form [facility] she wanted to let Dr Simental know that they are weaning her off Hydrocodone due to a bad UDS in February patient had test positive for methamphetamines. Dr Simental is aware.

## 2023-04-06 DIAGNOSIS — Q89.01 ASPLENIA: Primary | ICD-10-CM

## 2023-04-06 NOTE — PROGRESS NOTES
Please call the patient regarding her abnormal result.  Her white blood cell count is high and she is anemic.  I still think most likely postoperative from the splenectomy but have her come in Monday for another CBC and if H&H is any lower we are going to probably talk about a transfusion.  Also please go ahead and put in a referral to heme-onc for asplenia, Dr. Duckworth

## 2023-04-10 ENCOUNTER — LAB (OUTPATIENT)
Dept: LAB | Facility: OTHER | Age: 58
End: 2023-04-10
Payer: MEDICARE

## 2023-04-10 DIAGNOSIS — S36.09XA SPLENIC RUPTURE: ICD-10-CM

## 2023-04-10 PROCEDURE — 85025 COMPLETE CBC W/AUTO DIFF WBC: CPT | Performed by: FAMILY MEDICINE

## 2023-04-10 PROCEDURE — 36415 COLL VENOUS BLD VENIPUNCTURE: CPT | Performed by: FAMILY MEDICINE

## 2023-04-11 ENCOUNTER — TELEPHONE (OUTPATIENT)
Dept: FAMILY MEDICINE CLINIC | Facility: CLINIC | Age: 58
End: 2023-04-11
Payer: MEDICARE

## 2023-04-11 LAB
BASOPHILS # BLD AUTO: 0.05 10*3/MM3 (ref 0–0.2)
BASOPHILS NFR BLD AUTO: 0.4 % (ref 0–1.5)
DEPRECATED RDW RBC AUTO: 45 FL (ref 37–54)
EOSINOPHIL # BLD AUTO: 0.03 10*3/MM3 (ref 0–0.4)
EOSINOPHIL NFR BLD AUTO: 0.2 % (ref 0.3–6.2)
ERYTHROCYTE [DISTWIDTH] IN BLOOD BY AUTOMATED COUNT: 14.9 % (ref 12.3–15.4)
HCT VFR BLD AUTO: 28.9 % (ref 34–46.6)
HGB BLD-MCNC: 8.8 G/DL (ref 12–15.9)
LYMPHOCYTES # BLD AUTO: 2.67 10*3/MM3 (ref 0.7–3.1)
LYMPHOCYTES NFR BLD AUTO: 19.7 % (ref 19.6–45.3)
MCH RBC QN AUTO: 25.4 PG (ref 26.6–33)
MCHC RBC AUTO-ENTMCNC: 30.4 G/DL (ref 31.5–35.7)
MCV RBC AUTO: 83.3 FL (ref 79–97)
MONOCYTES # BLD AUTO: 1.51 10*3/MM3 (ref 0.1–0.9)
MONOCYTES NFR BLD AUTO: 11.1 % (ref 5–12)
NEUTROPHILS NFR BLD AUTO: 68 % (ref 42.7–76)
NEUTROPHILS NFR BLD AUTO: 9.23 10*3/MM3 (ref 1.7–7)
PLATELET # BLD AUTO: 1023 10*3/MM3 (ref 140–450)
PMV BLD AUTO: 9.4 FL (ref 6–12)
RBC # BLD AUTO: 3.47 10*6/MM3 (ref 3.77–5.28)
WBC NRBC COR # BLD: 13.57 10*3/MM3 (ref 3.4–10.8)

## 2023-04-11 NOTE — TELEPHONE ENCOUNTER
----- Message from Radha Simental MD sent at 4/11/2023  2:00 PM CDT -----  Please call and tell her Dr. Duckworth is only seeing her for the blood issues and if she thinks she might be in congestive heart failure that I said she needs to immediately go to the ER now.    ----- Message -----  From: Scooter Carol BallBAN london  Sent: 4/11/2023   1:38 PM CDT  To: Radha Simental MD    Patient is scheduled to see Dr Duckworth on April 19th at 3:45 in [place] it was cancelled cause the appointment in [place] was far out he wanted to see her sooner. I spoke with the patient to let her know and she was not happy  that she has to wait a week she kept say she wont be walking in a week due to her legs and ankles swelling and hurting she stated the daughter thinks she is in congested heart failure so I tried to explain to the patient that Dr Duckworth is a hemo dr for her blood but I don't think she understood anything I was saying.   ----- Message -----  From: Radah Simental MD  Sent: 4/11/2023   1:23 PM CDT  To: Love Ramirez LPN    Still anemic but it came up a little bit.  Is not coming down anymore.  Her platelet count is still high from the recent splenectomy.  It looks like she had an appointment scheduled with Dr. Duckworth but it says canceled by provider.  Please check and see if they have her rescheduled.  I do want her to see heme-onc.  Have her do a repeat CBC again in 1    ----- Message -----  From: Love Ramirez LPN  Sent: 4/11/2023   9:40 AM CDT  To: Radha Simental MD    Pt called about her labs she had drawn yesterday.

## 2023-04-13 ENCOUNTER — TELEPHONE (OUTPATIENT)
Dept: FAMILY MEDICINE CLINIC | Facility: CLINIC | Age: 58
End: 2023-04-13

## 2023-04-13 ENCOUNTER — OFFICE VISIT (OUTPATIENT)
Dept: FAMILY MEDICINE CLINIC | Facility: CLINIC | Age: 58
End: 2023-04-13
Payer: MEDICARE

## 2023-04-13 VITALS
WEIGHT: 124 LBS | BODY MASS INDEX: 24.35 KG/M2 | SYSTOLIC BLOOD PRESSURE: 120 MMHG | HEART RATE: 104 BPM | OXYGEN SATURATION: 98 % | HEIGHT: 60 IN | DIASTOLIC BLOOD PRESSURE: 70 MMHG | TEMPERATURE: 97.5 F

## 2023-04-13 DIAGNOSIS — R91.8 LUNG MASS: Primary | ICD-10-CM

## 2023-04-13 DIAGNOSIS — Q89.01 ASPLENIA: ICD-10-CM

## 2023-04-13 DIAGNOSIS — I10 ESSENTIAL HYPERTENSION: ICD-10-CM

## 2023-04-13 DIAGNOSIS — D64.9 ANEMIA, UNSPECIFIED TYPE: ICD-10-CM

## 2023-04-13 PROCEDURE — 3078F DIAST BP <80 MM HG: CPT | Performed by: FAMILY MEDICINE

## 2023-04-13 PROCEDURE — 3044F HG A1C LEVEL LT 7.0%: CPT | Performed by: FAMILY MEDICINE

## 2023-04-13 PROCEDURE — 1159F MED LIST DOCD IN RCRD: CPT | Performed by: FAMILY MEDICINE

## 2023-04-13 PROCEDURE — 1160F RVW MEDS BY RX/DR IN RCRD: CPT | Performed by: FAMILY MEDICINE

## 2023-04-13 PROCEDURE — 99214 OFFICE O/P EST MOD 30 MIN: CPT | Performed by: FAMILY MEDICINE

## 2023-04-13 PROCEDURE — 3074F SYST BP LT 130 MM HG: CPT | Performed by: FAMILY MEDICINE

## 2023-04-13 RX ORDER — AMLODIPINE BESYLATE AND BENAZEPRIL HYDROCHLORIDE 5; 20 MG/1; MG/1
1 CAPSULE ORAL DAILY
Qty: 90 CAPSULE | Refills: 3 | Status: SHIPPED | OUTPATIENT
Start: 2023-04-13

## 2023-04-13 NOTE — PROGRESS NOTES
Subjective   Isabel Villalba is a 57 y.o. female.   With chronic low back pain, adult ADD, osteoporosis presenting today accompanied by her  for follow-up after recent CT was done.  She had a spontaneous rupture of her spleen a few weeks ago and underwent surgery.  There was a life-threatening situation.  Evidently the lung lesion was seen on that and she had a repeat scan done in ER which was a CTA this week which showed the mass.  She has a 5.1 x 4.5 x 4.7 perihilar mass on the right surrounding the right pulmonary artery and veins and extending along the right bronchus associate with bronchial wall thickening.  She reports that her father had lung cancer and her mother had colon cancer.  The patient is a longtime smoker.    Also she had been going to the pain clinic but there was a problem with her urine drug screen.  She said that evidently the pain clinic told her that 3 or 4 months ago her urine drug screen showed methamphetamine which she vehemently denies using.  Anyway her medication had been prescribed up until recently when she said suddenly they just told her that they were cutting her off.    Pain  Associated symptoms include arthralgias and fatigue. Pertinent negatives include no congestion, myalgias or sore throat.       The following portions of the patient's history were reviewed and updated as appropriate: allergies, current medications, past family history, past medical history, past social history, past surgical history and problem list.    Review of Systems   Constitutional: Positive for fatigue.   HENT: Negative for congestion, postnasal drip, rhinorrhea, sinus pressure and sore throat.    Respiratory: Negative for choking and shortness of breath.    Gastrointestinal: Negative.    Musculoskeletal: Positive for arthralgias and gait problem. Negative for myalgias.   Skin: Negative.    Allergic/Immunologic: Negative for immunocompromised state.   Neurological: Negative for tremors,  "seizures and syncope.   Hematological: Negative.    Psychiatric/Behavioral: Negative for agitation, decreased concentration, dysphoric mood, sleep disturbance and suicidal ideas. The patient is not nervous/anxious.    All other systems reviewed and are negative.      Objective    Body mass index is 24.22 kg/m².  Vitals:    04/13/23 0851   BP: 120/70   Pulse: 104   Temp: 97.5 °F (36.4 °C)   SpO2: 98%   Weight: 56.2 kg (124 lb)   Height: 152.4 cm (60\")       Physical Exam  Vitals and nursing note reviewed.   Constitutional:       Appearance: She is well-developed.   HENT:      Head: Normocephalic and atraumatic.      Nose: Nose normal.   Eyes:      Conjunctiva/sclera: Conjunctivae normal.      Pupils: Pupils are equal, round, and reactive to light.   Neck:      Thyroid: No thyromegaly.      Vascular: No JVD.      Trachea: No tracheal deviation.   Cardiovascular:      Rate and Rhythm: Normal rate and regular rhythm.      Heart sounds: Normal heart sounds. No murmur heard.  Pulmonary:      Effort: Pulmonary effort is normal.      Breath sounds: Normal breath sounds. No wheezing.   Abdominal:      General: Bowel sounds are normal. There is no distension.      Palpations: Abdomen is soft.      Tenderness: There is no abdominal tenderness.   Musculoskeletal:         General: Normal range of motion.      Cervical back: Normal range of motion and neck supple.   Lymphadenopathy:      Cervical: No cervical adenopathy.   Skin:     General: Skin is warm and dry.      Findings: No rash.   Neurological:      Mental Status: She is alert and oriented to person, place, and time.      Coordination: Coordination normal.         Assessment & Plan   Diagnoses and all orders for this visit:    1. Lung mass (Primary)  -     Ambulatory Referral to Cardiothoracic Surgery    2. Essential hypertension  -     amLODIPine-benazepril (LOTREL 5-20) 5-20 MG per capsule; Take 1 capsule by mouth Daily.  Dispense: 90 capsule; Refill: 3    3. " Asplenia    4. Anemia, unspecified type    Advised the patient that it is very likely that she has a lung cancer.  Patient already had a referral to heme-onc concerning the blood dyscrasias following the splenectomy.  We will message Dr. Duckworth and see about following up with him regarding this as well and see about arranging for bronchoscopy and biopsy.  Spent some time discussing overall prognosis with patient this morning advised that we will be able to know much more when we get of definite tissue biopsy and are able to do some staging.  At some point would like to get a whole-body bone scan as well to evaluate for potential metastases if the tissue biopsy is consistent with suspicion of malignancy.    We will recheck a CBC next week, may need to consider transfusion if H&H continues to drop    Continue current medications for hypertension and continue to monitor blood pressures regularly with goal of 130/80 or less          This document has been electronically signed by Radha Simental MD on April 13, 2023 09:20 CDT

## 2023-04-13 NOTE — TELEPHONE ENCOUNTER
----- Message from Radha Simental MD sent at 4/13/2023 12:19 PM CDT -----  Please call and tell her I sent a referral to Dr. Porter in [place] for the lung biopsy.  He is a cardiothoracic surgeon.  Dr. Duckworth will see her next week.  Also she has a standing order for blood counts once a week.  She can come by Monday for another cbc.

## 2023-04-17 ENCOUNTER — LAB (OUTPATIENT)
Dept: LAB | Facility: OTHER | Age: 58
End: 2023-04-17
Payer: MEDICARE

## 2023-04-17 DIAGNOSIS — R91.1 LUNG NODULE: Primary | ICD-10-CM

## 2023-04-17 DIAGNOSIS — S36.09XA SPLENIC RUPTURE: ICD-10-CM

## 2023-04-17 LAB
ANISOCYTOSIS BLD QL: ABNORMAL
DEPRECATED RDW RBC AUTO: 51.9 FL (ref 37–54)
ERYTHROCYTE [DISTWIDTH] IN BLOOD BY AUTOMATED COUNT: 17.4 % (ref 12.3–15.4)
HCT VFR BLD AUTO: 29.2 % (ref 34–46.6)
HGB BLD-MCNC: 8.9 G/DL (ref 12–15.9)
HYPOCHROMIA BLD QL: ABNORMAL
LYMPHOCYTES # BLD MANUAL: 2.83 10*3/MM3 (ref 0.7–3.1)
LYMPHOCYTES NFR BLD MANUAL: 10 % (ref 5–12)
MCH RBC QN AUTO: 25.8 PG (ref 26.6–33)
MCHC RBC AUTO-ENTMCNC: 30.5 G/DL (ref 31.5–35.7)
MCV RBC AUTO: 84.6 FL (ref 79–97)
MONOCYTES # BLD: 1.35 10*3/MM3 (ref 0.1–0.9)
NEUTROPHILS # BLD AUTO: 9.29 10*3/MM3 (ref 1.7–7)
NEUTROPHILS NFR BLD MANUAL: 69 % (ref 42.7–76)
PLATELET # BLD AUTO: 1062 10*3/MM3 (ref 140–450)
PMV BLD AUTO: 8.3 FL (ref 6–12)
POLYCHROMASIA BLD QL SMEAR: ABNORMAL
RBC # BLD AUTO: 3.45 10*6/MM3 (ref 3.77–5.28)
SCAN SLIDE: NORMAL
SMALL PLATELETS BLD QL SMEAR: ABNORMAL
VARIANT LYMPHS NFR BLD MANUAL: 21 % (ref 19.6–45.3)
WBC MORPH BLD: NORMAL
WBC NRBC COR # BLD: 13.46 10*3/MM3 (ref 3.4–10.8)

## 2023-04-17 PROCEDURE — 85025 COMPLETE CBC W/AUTO DIFF WBC: CPT | Performed by: FAMILY MEDICINE

## 2023-04-17 PROCEDURE — 36415 COLL VENOUS BLD VENIPUNCTURE: CPT | Performed by: FAMILY MEDICINE

## 2023-04-17 RX ORDER — PANTOPRAZOLE SODIUM 40 MG/1
40 TABLET, DELAYED RELEASE ORAL DAILY
Qty: 30 TABLET | Refills: 3 | Status: SHIPPED | OUTPATIENT
Start: 2023-04-17 | End: 2023-08-15

## 2023-04-17 RX ORDER — DOCUSATE SODIUM 100 MG/1
100 CAPSULE, LIQUID FILLED ORAL EVERY 12 HOURS SCHEDULED
Qty: 60 CAPSULE | Refills: 0 | Status: SHIPPED | OUTPATIENT
Start: 2023-04-17 | End: 2023-04-19 | Stop reason: SDUPTHER

## 2023-04-17 NOTE — PROGRESS NOTES
Please call the patient regarding her abnormal result.  Tell her her blood count is holding steady, did not really come up but is not going down.

## 2023-04-19 ENCOUNTER — CONSULT (OUTPATIENT)
Dept: ONCOLOGY | Facility: CLINIC | Age: 58
End: 2023-04-19
Payer: MEDICARE

## 2023-04-19 ENCOUNTER — LAB (OUTPATIENT)
Dept: ONCOLOGY | Facility: HOSPITAL | Age: 58
End: 2023-04-19
Payer: MEDICARE

## 2023-04-19 VITALS
SYSTOLIC BLOOD PRESSURE: 140 MMHG | WEIGHT: 112.6 LBS | DIASTOLIC BLOOD PRESSURE: 71 MMHG | OXYGEN SATURATION: 94 % | RESPIRATION RATE: 18 BRPM | TEMPERATURE: 97.8 F | BODY MASS INDEX: 21.99 KG/M2 | HEART RATE: 91 BPM

## 2023-04-19 DIAGNOSIS — D64.9 ANEMIA, UNSPECIFIED TYPE: Primary | ICD-10-CM

## 2023-04-19 DIAGNOSIS — S36.09XA SPLENIC RUPTURE: ICD-10-CM

## 2023-04-19 DIAGNOSIS — D75.839 THROMBOCYTOSIS: ICD-10-CM

## 2023-04-19 DIAGNOSIS — D72.829 LEUKOCYTOSIS, UNSPECIFIED TYPE: ICD-10-CM

## 2023-04-19 DIAGNOSIS — R91.8 MASS OF RIGHT LUNG: ICD-10-CM

## 2023-04-19 LAB
ANISOCYTOSIS BLD QL: ABNORMAL
DEPRECATED RDW RBC AUTO: 50.3 FL (ref 37–54)
ERYTHROCYTE [DISTWIDTH] IN BLOOD BY AUTOMATED COUNT: 17.2 % (ref 12.3–15.4)
FERRITIN SERPL-MCNC: 386.3 NG/ML (ref 13–150)
GIANT PLATELETS: ABNORMAL
HCT VFR BLD AUTO: 26.8 % (ref 34–46.6)
HGB BLD-MCNC: 8.2 G/DL (ref 12–15.9)
HYPOCHROMIA BLD QL: ABNORMAL
IRON 24H UR-MRATE: 9 MCG/DL (ref 37–145)
IRON SATN MFR SERPL: 5 % (ref 20–50)
LARGE PLATELETS: ABNORMAL
LYMPHOCYTES # BLD MANUAL: 3.8 10*3/MM3 (ref 0.7–3.1)
LYMPHOCYTES NFR BLD MANUAL: 11 % (ref 5–12)
MCH RBC QN AUTO: 24.8 PG (ref 26.6–33)
MCHC RBC AUTO-ENTMCNC: 30.6 G/DL (ref 31.5–35.7)
MCV RBC AUTO: 81 FL (ref 79–97)
METAMYELOCYTES NFR BLD MANUAL: 1 % (ref 0–0)
MONOCYTES # BLD: 1.9 10*3/MM3 (ref 0.1–0.9)
MYELOCYTES NFR BLD MANUAL: 1 % (ref 0–0)
NEUTROPHILS # BLD AUTO: 11.23 10*3/MM3 (ref 1.7–7)
NEUTROPHILS NFR BLD MANUAL: 58 % (ref 42.7–76)
NEUTS BAND NFR BLD MANUAL: 7 % (ref 0–5)
PLATELET # BLD AUTO: 1049 10*3/MM3 (ref 140–450)
PMV BLD AUTO: 8.5 FL (ref 6–12)
RBC # BLD AUTO: 3.31 10*6/MM3 (ref 3.77–5.28)
SMALL PLATELETS BLD QL SMEAR: ABNORMAL
TIBC SERPL-MCNC: 194 MCG/DL (ref 298–536)
TRANSFERRIN SERPL-MCNC: 130 MG/DL (ref 200–360)
VARIANT LYMPHS NFR BLD MANUAL: 22 % (ref 19.6–45.3)
WBC MORPH BLD: NORMAL
WBC NRBC COR # BLD: 17.27 10*3/MM3 (ref 3.4–10.8)

## 2023-04-19 PROCEDURE — 85007 BL SMEAR W/DIFF WBC COUNT: CPT

## 2023-04-19 PROCEDURE — 85025 COMPLETE CBC W/AUTO DIFF WBC: CPT

## 2023-04-19 RX ORDER — DOXYCYCLINE HYCLATE 50 MG/1
324 CAPSULE, GELATIN COATED ORAL
Qty: 30 TABLET | Refills: 2 | Status: SHIPPED | OUTPATIENT
Start: 2023-04-19

## 2023-04-19 RX ORDER — DOCUSATE SODIUM 100 MG/1
100 CAPSULE, LIQUID FILLED ORAL EVERY 12 HOURS SCHEDULED
Qty: 60 CAPSULE | Refills: 0 | Status: SHIPPED | OUTPATIENT
Start: 2023-04-19

## 2023-04-19 RX ORDER — LANOLIN ALCOHOL/MO/W.PET/CERES
1000 CREAM (GRAM) TOPICAL DAILY
Qty: 90 TABLET | Refills: 1 | Status: SHIPPED | OUTPATIENT
Start: 2023-04-19

## 2023-04-19 NOTE — LETTER
April 19, 2023       No Recipients    Patient: Isabel Villalba   YOB: 1965   Date of Visit: 4/19/2023       Dear Radha Simental MD:    Thank you for referring Isabel Villalba to me for evaluation. Below are the relevant portions of my assessment and plan of care.    Encounter Diagnosis and Orders:  Diagnoses and all orders for this visit:    1. Anemia, unspecified type (Primary)  -     Iron and TIBC  -     Ferritin  -     Folate    2. Thrombocytosis  -     Iron and TIBC  -     Ferritin  -     Folate    3. Leukocytosis, unspecified type  -     Iron and TIBC  -     Ferritin  -     Folate    4. Mass of right lung  -     Iron and TIBC  -     Ferritin  -     Folate    Other orders  -     ferrous gluconate (FERGON) 324 MG tablet; Take 1 tablet by mouth Daily With Breakfast.  Dispense: 30 tablet; Refill: 2  -     vitamin B-12 (CYANOCOBALAMIN) 1000 MCG tablet; Take 1 tablet by mouth Daily.  Dispense: 90 tablet; Refill: 1  -     docusate sodium (COLACE) 100 MG capsule; Take 1 capsule by mouth Every 12 (Twelve) Hours.  Dispense: 60 capsule; Refill: 0        If you have questions, please do not hesitate to call me. I look forward to following Isabel along with you.         Sincerely,        Brett Duckworth MD        CC:   No Recipients

## 2023-04-19 NOTE — H&P (VIEW-ONLY)
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  Patient admits to 40 pound weight loss in last 1 year.  States 25 pound out of 40 pound was prior to her splenic rupture since she started Ozempic last year.  She has lost about 15 pounds since splenic rupture.  Complains of fatigue.  Complains of worsening cough productive of clear sputum.  Denies any hemoptysis.  Denies any new lymph node enlargement.  Complains of lower extremity pain and intermittent swelling.  Denies any fevers.  Family history significant for father having history of lung cancer.  Mother had a history of kidney cancer in the past and currently she is battling rectal cancer.  Denies any personal history of DVT or pulmonary embolism.  Denies any prior history of malignancy.                PAST MEDICAL HISTORY:    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   • Epigastric pain    • Essential hypertension    • Fatigue    • Gastritis    • Gastroesophageal reflux disease    • Gouty arthropathy     left first  MTP joint   • H/O screening mammography 06/24/2014   • Hallux limitus of right foot    • Hernia of abdominal wall    • Hyperlipidemia    • Irreducible incisional hernia     vs lipoma   • Lumbago with sciatica    • Lumbago-sciatica due to displacement of lumbar intervertebral disc    • Protrusion of cervical intervertebral disc     Broad based intervertebral disc protrusion - C5-6, minimally impressing upon ventral thecal sac by MRI done 4/7/15    • Pruritic rash    • Systolic murmur    • Vitamin B12 deficiency    • Weight increase        PAST SURGICAL HISTORY:  Past Surgical History:   Procedure Laterality Date   • BREAST BIOPSY Left    • CARPAL TUNNEL RELEASE Bilateral 1995   • CHOLECYSTECTOMY  2004   • INJECTION OF MEDICATION  11/17/2015    Bicillin LA 1.2   • INJECTION OF MEDICATION  11/17/2015    depo medrol   • SPLENECTOMY  03/07/2023   • TUBAL ABDOMINAL LIGATION  2004       ALLERGIES:    Allergies   Allergen Reactions   • Morphine And Related Nausea And Vomiting       SOCIAL HISTORY:   Social History     Tobacco Use   • Smoking status: Every Day     Packs/day: 0.50     Types: Cigarettes   • Smokeless tobacco: Never   Substance Use Topics   • Alcohol use: No   • Drug use: No       CURRENT MEDICATIONS:    Current Outpatient Medications   Medication Sig Dispense Refill   • albuterol sulfate HFA (Ventolin HFA) 108 (90 Base) MCG/ACT inhaler Inhale 2 puffs Every 6 (Six) Hours As Needed for Wheezing or Shortness of Air. 18 g 5   • amLODIPine-benazepril (LOTREL 5-20) 5-20 MG per capsule Take 1 capsule by mouth Daily. 90 capsule 3   • atomoxetine (STRATTERA) 25 MG capsule TAKE 1 CAPSULE BY MOUTH DAILY. 90 capsule 5   • atorvastatin (LIPITOR) 10 MG tablet TAKE 1 TABLET BY MOUTH DAILY. 90 tablet 5   • benzonatate (TESSALON) 200 MG capsule Take 1 capsule by mouth 3 (Three) Times a Day As Needed for Cough. 30 capsule 2   • denosumab (Prolia) 60 MG/ML solution prefilled syringe syringe Inject 1 mL under the skin into the  appropriate area as directed Every 6 (Six) Months. 1 mL 1   • docusate sodium (COLACE) 100 MG capsule Take 1 capsule by mouth Every 12 (Twelve) Hours. 60 capsule 0   • fluticasone (FLONASE) 50 MCG/ACT nasal spray 2 sprays by Each Nare route Daily. 16 g 5   • glucose blood test strip Patient is testing 2xs daily 100 each 12   • glucose monitor monitoring kit 1 each 2 (two) times a day. Patient is testing 2 xs daily 1 each 0   • HYDROcodone-acetaminophen (NORCO)  MG per tablet Take 1 tablet by mouth 4 (Four) Times a Day As Needed.     • Lancets Misc. misc Patient is testing 2 xs daily 100 each 11   • meloxicam (MOBIC) 15 MG tablet Take 1 tablet by mouth Daily. 30 tablet 5   • memantine (NAMENDA) 5 MG tablet 2 (Two) Times a Day.     • Ozempic, 0.25 or 0.5 MG/DOSE, 2 MG/1.5ML solution pen-injector INJECT 0.5 MG UNDER THE SKIN INTO THE APPROPRIATE AREA AS DIRECTED 1 (ONE) TIME PER WEEK. 1.5 mL 5   • Pain Reliever Extra Strength 500 MG tablet Take 1 tablet by mouth Every 6 (Six) Hours As Needed. for pain     • pantoprazole (PROTONIX) 40 MG EC tablet Take 1 tablet by mouth Daily for 120 days. 30 tablet 3   • pregabalin (LYRICA) 150 MG capsule TAKE 1 CAPSULE BY MOUTH 3 (THREE) TIMES A DAY. 90 capsule 0   • Repatha SureClick solution auto-injector SureClick injection INJECT 1 ML UNDER THE SKIN INTO THE APPROPRIATE AREA AS DIRECTED EVERY 14 (FOURTEEN) DAYS. 2 mL 5   • tiZANidine (ZANAFLEX) 4 MG tablet Take 1 tablet by mouth Every 8 (Eight) Hours As Needed for Muscle Spasms. 270 tablet 5   • vitamin D (ERGOCALCIFEROL) 1.25 MG (91112 UT) capsule capsule Take 1 capsule by mouth 2 (Two) Times a Week. 8 capsule 5     Current Facility-Administered Medications   Medication Dose Route Frequency Provider Last Rate Last Admin   • cyanocobalamin injection 1,000 mcg  1,000 mcg Intramuscular Q14 Days Radha Simental MD   1,000 mcg at 07/07/21 1421   • cyanocobalamin injection 1,000 mcg  1,000 mcg Intramuscular Q14 Days Kamille  MD Radha            HOME MEDICATIONS:   Current Outpatient Medications on File Prior to Visit   Medication Sig Dispense Refill   • albuterol sulfate HFA (Ventolin HFA) 108 (90 Base) MCG/ACT inhaler Inhale 2 puffs Every 6 (Six) Hours As Needed for Wheezing or Shortness of Air. 18 g 5   • amLODIPine-benazepril (LOTREL 5-20) 5-20 MG per capsule Take 1 capsule by mouth Daily. 90 capsule 3   • atomoxetine (STRATTERA) 25 MG capsule TAKE 1 CAPSULE BY MOUTH DAILY. 90 capsule 5   • atorvastatin (LIPITOR) 10 MG tablet TAKE 1 TABLET BY MOUTH DAILY. 90 tablet 5   • benzonatate (TESSALON) 200 MG capsule Take 1 capsule by mouth 3 (Three) Times a Day As Needed for Cough. 30 capsule 2   • denosumab (Prolia) 60 MG/ML solution prefilled syringe syringe Inject 1 mL under the skin into the appropriate area as directed Every 6 (Six) Months. 1 mL 1   • docusate sodium (COLACE) 100 MG capsule Take 1 capsule by mouth Every 12 (Twelve) Hours. 60 capsule 0   • fluticasone (FLONASE) 50 MCG/ACT nasal spray 2 sprays by Each Nare route Daily. 16 g 5   • glucose blood test strip Patient is testing 2xs daily 100 each 12   • glucose monitor monitoring kit 1 each 2 (two) times a day. Patient is testing 2 xs daily 1 each 0   • HYDROcodone-acetaminophen (NORCO)  MG per tablet Take 1 tablet by mouth 4 (Four) Times a Day As Needed.     • Lancets Misc. [facility] [place] Patient is testing 2 xs daily 100 each 11   • meloxicam (MOBIC) 15 MG tablet Take 1 tablet by mouth Daily. 30 tablet 5   • memantine (NAMENDA) 5 MG tablet 2 (Two) Times a Day.     • Ozempic, 0.25 or 0.5 MG/DOSE, 2 MG/1.5ML solution pen-injector INJECT 0.5 MG UNDER THE SKIN INTO THE APPROPRIATE AREA AS DIRECTED 1 (ONE) TIME PER WEEK. 1.5 mL 5   • Pain Reliever Extra Strength 500 MG tablet Take 1 tablet by mouth Every 6 (Six) Hours As Needed. for pain     • pantoprazole (PROTONIX) 40 MG EC tablet Take 1 tablet by mouth Daily for 120 days. 30 tablet 3   • pregabalin (LYRICA) 150 MG capsule TAKE 1  CAPSULE BY MOUTH 3 (THREE) TIMES A DAY. 90 capsule 0   • Repatha SureClick solution auto-injector SureClick injection INJECT 1 ML UNDER THE SKIN INTO THE APPROPRIATE AREA AS DIRECTED EVERY 14 (FOURTEEN) DAYS. 2 mL 5   • tiZANidine (ZANAFLEX) 4 MG tablet Take 1 tablet by mouth Every 8 (Eight) Hours As Needed for Muscle Spasms. 270 tablet 5   • vitamin D (ERGOCALCIFEROL) 1.25 MG (46381 UT) capsule capsule Take 1 capsule by mouth 2 (Two) Times a Week. 8 capsule 5     Current Facility-Administered Medications on File Prior to Visit   Medication Dose Route Frequency Provider Last Rate Last Admin   • cyanocobalamin injection 1,000 mcg  1,000 mcg Intramuscular Q14 Days Radha Simental MD   1,000 mcg at 07/07/21 1421   • cyanocobalamin injection 1,000 mcg  1,000 mcg Intramuscular Q14 Days Radha Simental MD           FAMILY HISTORY:    Family History   Problem Relation Age of Onset   • ADD / ADHD Other    • Bipolar disorder Other    • Cancer Other    • Coronary artery disease Other    • Diabetes Other    • Heart disease Other    • Hyperlipidemia Other    • Hypertension Other    • Lung cancer Other    • Migraines Other    • Polycystic ovary syndrome Other    • Schizophrenia Other    • Stroke Other    • Tuberculosis Other    • Other Other         respiratory disorder; smoking tobacco   • Breast cancer Paternal Grandmother    • Hypertension Mother    • Cancer Father    • Breast cancer Father        REVIEW OF SYSTEMS:        CONSTITUTIONAL:  Complains of fatigue.  Positive for 40 pound weight loss in last 1 year.  Denies any fever, chills .     EYES: No visual disturbances. No discharge. No new lesion.    ENMT:  No epistaxis, mouth sores or difficulty swallowing.    RESPIRATORY: Positive for shortness of breath.  Positive for cough productive of clear sputum.  No hemoptysis    CARDIOVASCULAR:  No chest pain or palpitations.    GASTROINTESTINAL:  No abdominal pain nausea, vomiting or blood in the stool.    GENITOURINARY: No  dysuria or hematuria.    MUSCULOSKELETAL: Positive for chronic back pain.  Positive for bilateral lower extremity pain.    LYMPHATICS:  Denies any abnormal swollen glands anywhere in the body.    NEUROLOGICAL : No tingling or numbness. No headache or dizziness. No seizures or balance problems.    SKIN: No new skin lesion.    ENDOCRINE : No new hot or cold intolerance. No new polyuria. No polydipsia.        PHYSICAL EXAMINATION:      VITAL SIGNS:  /71   Pulse 91   Temp 97.8 °F (36.6 °C)   Resp 18   Wt 51.1 kg (112 lb 9.6 oz)   SpO2 94%   BMI 21.99 kg/m²       04/19/23  1522   Weight: 51.1 kg (112 lb 9.6 oz)       ECOG performance status: 1    CONSTITUTIONAL:  Not in any distress.    RESPIRATORY:  Fair air entry bilateral. No rhonchi or wheezing. Fair respiratory effort.    CARDIOVASCULAR:  S1, S2. Regular rate and rhythm. No murmur or gallop appreciated.    MUSCULOSKELETAL:  Trace edema. No calf tenderness.  Decreased range of motion.    NEUROLOGIC:    No motor  deficit appreciated. Cranial nerves II-XII grossly intact.    SKIN : Xanthelasma present on bilateral Nplate. Skin is warm and dry to touch.    LYMPHATICS: No new enlarged lymph nodes in neck or supraclavicular area.    PSYCHIATRY: Alert, awake and oriented ×3.            DIAGNOSTIC DATA:    WBC   Date Value Ref Range Status   04/17/2023 13.46 (H) 3.40 - 10.80 10*3/mm3 Final     RBC   Date Value Ref Range Status   04/17/2023 3.45 (L) 3.77 - 5.28 10*6/mm3 Final     Hemoglobin   Date Value Ref Range Status   04/17/2023 8.9 (L) 12.0 - 15.9 g/dL Final   03/08/2023 9.4 (L) 12.7 - 14.7 g/dL Final     Hematocrit   Date Value Ref Range Status   04/17/2023 29.2 (L) 34.0 - 46.6 % Final   03/08/2023 28.9 (L) 37.9 - 43.9 % Final     MCV   Date Value Ref Range Status   04/17/2023 84.6 79.0 - 97.0 fL Final     MCH   Date Value Ref Range Status   04/17/2023 25.8 (L) 26.6 - 33.0 pg Final     MCHC   Date Value Ref Range Status   04/17/2023 30.5 (L) 31.5 - 35.7  g/dL Final     RDW   Date Value Ref Range Status   04/17/2023 17.4 (H) 12.3 - 15.4 % Final     RDW-SD   Date Value Ref Range Status   04/17/2023 51.9 37.0 - 54.0 fl Final     MPV   Date Value Ref Range Status   04/17/2023 8.3 6.0 - 12.0 fL Final     Platelets   Date Value Ref Range Status   04/17/2023 1,062 (C) 140 - 450 10*3/mm3 Final     Neutrophil %   Date Value Ref Range Status   04/10/2023 68.0 42.7 - 76.0 % Final     Lymphocyte %   Date Value Ref Range Status   04/10/2023 19.7 19.6 - 45.3 % Final     Monocyte %   Date Value Ref Range Status   04/10/2023 11.1 5.0 - 12.0 % Final     Eosinophil %   Date Value Ref Range Status   04/10/2023 0.2 (L) 0.3 - 6.2 % Final     Basophil %   Date Value Ref Range Status   04/10/2023 0.4 0.0 - 1.5 % Final     Neutrophils Absolute   Date Value Ref Range Status   04/17/2023 9.29 (H) 1.70 - 7.00 10*3/mm3 Final     Neutrophils, Absolute   Date Value Ref Range Status   04/10/2023 9.23 (H) 1.70 - 7.00 10*3/mm3 Final     Lymphocytes, Absolute   Date Value Ref Range Status   04/10/2023 2.67 0.70 - 3.10 10*3/mm3 Final     Monocytes, Absolute   Date Value Ref Range Status   04/10/2023 1.51 (H) 0.10 - 0.90 10*3/mm3 Final     Eosinophils, Absolute   Date Value Ref Range Status   04/10/2023 0.03 0.00 - 0.40 10*3/mm3 Final     Basophils, Absolute   Date Value Ref Range Status   04/10/2023 0.05 0.00 - 0.20 10*3/mm3 Final     nRBC   Date Value Ref Range Status   06/24/2014 0  Final   06/24/2014 0  Final     Glucose   Date Value Ref Range Status   03/28/2023 117 (H) 70 - 99 mg/dL Final     Sodium   Date Value Ref Range Status   04/11/2023 136 136 - 145 mmol/L Final     Potassium   Date Value Ref Range Status   04/11/2023 3.1 (L) 3.5 - 5.1 mmol/L Final     CO2   Date Value Ref Range Status   03/28/2023 29.0 22.0 - 30.0 mmol/L Final     Chloride   Date Value Ref Range Status   04/11/2023 98 98 - 107 mmol/L Final     Anion Gap   Date Value Ref Range Status   03/28/2023 8.0 5.0 - 15.0 mmol/L  Final     Creatinine   Date Value Ref Range Status   04/11/2023 0.7 0.6 - 1.0 mg/dL Final     BUN   Date Value Ref Range Status   04/11/2023 6 (L) 7 - 18 mg/dL Final     BUN/Creatinine Ratio   Date Value Ref Range Status   03/28/2023 10.7 7.0 - 25.0 Final     Calcium   Date Value Ref Range Status   04/11/2023 9.2 8.5 - 10.1 mg/dL Final     eGFR Non  Amer   Date Value Ref Range Status   07/01/2021 80 51 - 120 mL/min/1.73 Final     Alkaline Phosphatase   Date Value Ref Range Status   04/11/2023 97 46 - 116 U/L Final     Total Protein   Date Value Ref Range Status   04/11/2023 7.6 6.4 - 8.2 g/dL Final     ALT (SGPT)   Date Value Ref Range Status   04/11/2023 11 (L) 14 - 59 U/L Final     AST (SGOT)   Date Value Ref Range Status   04/11/2023 10 (L) 15 - 37 U/L Final     Total Bilirubin   Date Value Ref Range Status   04/11/2023 0.20 0.20 - 1.00 mg/dL Final     Albumin   Date Value Ref Range Status   04/11/2023 1.9 (L) 3.4 - 5.0 g/dL Final     Globulin   Date Value Ref Range Status   03/28/2023 4.3 (H) 2.3 - 3.5 gm/dL Final     Lab Results   Component Value Date    DQFBHMRM02 327 03/28/2023    FOLATE 5.27 08/02/2022     No results found for: , LABCA2, AFPTM, HCGQUANT, , CHROMGRNA, 2RDMG29ANU, CEA, REFLABREPO]    Radiology Data :  XR Chest 1 View    Result Date: 4/11/2023    Greater than 6 cm mass/mass opacity of the right hilum. Mediastinal adenopathy. This is partly visualized and described on CT abdomen March 6. If not previously performed recommend dedicated CT chest with contrast. Workstation: [id number]    US Venous Doppler Lower Extremity Bilateral (duplex)    Result Date: 4/11/2023    No evidence of deep vein thrombosis bilateral lower extremities. Workstation: [id number]    CTA Chest Pulmonary Embolism w/Contrast per Contrast Protocol    Result Date: 4/11/2023         1.  No pulmonary embolism.  No evidence of right heart strain. 2.  Stable right perihilar mass measuring approximately 5.1 x 4.5 x 4.7  cm surrounding the right pulmonary artery and pulmonary veins. The mass extends extends along the right bronchus with associated bronchial wall thickening, extension of the mass into  the subcarinal region and the anterior right paratracheal region.  Findings are concerning for malignancy with involvement of the mediastinal lymph nodes. 3.  Stable 0.9 x 0.8 cm pulmonary nodule in the left lower lobe unchanged when compared to prior examination. Recommend attention on follow-up examination. 4.  Nonspecific stable bronchial wall thickening noted. Workstation: [id number]      Pathology :  * Cannot find OR log *    Assessment and plan:    1.  Thrombocytosis:  - Recent platelet count is 1062,000.  - Patient's platelet count prior to splenic rupture was normal last year.  - Differential diagnosis for thrombocytosis includes reactive secondary to splenectomy versus possible developing iron deficiency due to bleeding from splenic rupture.  Bone marrow pathology is less likely at this point since it is all acute since her splenectomy.  - Due to extreme thrombocytosis recommend starting aspirin 81 mg p.o. daily for thromboprophylaxis.  - We will check iron studies and folate level today.  - We will have patient return to clinic in 3 weeks after PET/CT and possible bronchoscopy and biopsy to go over the results.    2.  Anemia:  - Secondary to splenectomy and blood loss from splenic rupture.  - B12 level is 328.  Patient has not been taking B12 injection regularly.  - Recommend starting B12 1000 mcg p.o. daily.  We will also check iron studies and folate to rule out other nutritional deficiency.  -Anemia work-up done today shows hemoglobin is 8.2.  Iron saturation is only 5% with iron level of 9.  Recommend starting ferrous gluconate p.o. daily with stool softener as required.  - Patient had was counseled about side effect of oral iron including severe stomach upset and constipation.  She was encouraged to call our office if  she cannot tolerate iron by mouth.    3.  Leukocytosis:  - Multifactorial secondary to smoking plus splenectomy plus or minus reactive due to lung mass.  - White blood cell count is 13,000 which is predominantly increase in neutrophil.  Will monitor with CBC for now    4.  Right lung mass:  - CT [facility] and reviewed.  CT scan does show about 5 cm perihilar mass on right side extension into mediastinum worrisome for lymphadenopathy.  - Patient has upcoming PET/CT on April 24, 2023 which has been ordered by Dr. Porter.  - Discussed with patient and her daughters that if PET/CT has any abnormal uptake worrisome for malignancy she will need bronchoscopy for tissue diagnosis.  - For now we will provide her 3-week appointments to go over the result of PET/CT and possible biopsy.    5.  Hypertension    6.  Diabetes mellitus on Ozempic    7. Isabel Villalba  reports that she has been smoking cigarettes. She has been smoking an average of .5 packs per day. She has never used smokeless tobacco.. I have educated her on the risk of diseases from using tobacco products such as cancer, COPD, heart disease and cataracts.     I advised her to quit and she is willing to quit. We have discussed the following method/s for tobacco cessation:  Counseling.  Together we have set a quit date for 1 month from today.  She will follow up with me in 3 weeks or sooner to check on her progress.    I spent 3  minutes counseling the patient.       8.  Health maintenance: Patient never had a screening colonoscopy done, she was counseled about importance of screening colonoscopy.  Has not had a Pap smear for greater than 30 years was counseled about importance of screening Pap smear.  She is up-to-date with her mammogram        9.  Prescription: Prescription for ferrous gluconate, Colace, B12 has been sent to patient's pharmacy today    About 60 minutes were spent for consultation including obtaining,  reviewing records, discussing result of imaging, blood work possible differential diagnosis, need for further imaging for lung mass and possible biopsy as well as answering their questions.        PHQ-9 Total Score: 0   -Patient is not homicidal or suicidal.  No acute intervention required.      Isabel Villalba reports a pain score of 8.  Given her pain assessment as noted, treatment options were discussed and the following options were decided upon as a follow-up plan to address the patient's pain: continuation of current treatment plan for pain.             Thank you for this consultation.    Brett Duckworth MD  4/19/2023  15:26 CDT        Part of this note may be an electronic transcription/translation of spoken language to printed text using the Dragon Dictation System.

## 2023-04-19 NOTE — PROGRESS NOTES
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  Patient admits to 40 pound weight loss in last 1 year.  States 25 pound out of 40 pound was prior to her splenic rupture since she started Ozempic last year.  She has lost about 15 pounds since splenic rupture.  Complains of fatigue.  Complains of worsening cough productive of clear sputum.  Denies any hemoptysis.  Denies any new lymph node enlargement.  Complains of lower extremity pain and intermittent swelling.  Denies any fevers.  Family history significant for father having history of lung cancer.  Mother had a history of kidney cancer in the past and currently she is battling rectal cancer.  Denies any personal history of DVT or pulmonary embolism.  Denies any prior history of malignancy.                PAST MEDICAL HISTORY:    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   • Epigastric pain    • Essential hypertension    • Fatigue    • Gastritis    • Gastroesophageal reflux disease    • Gouty arthropathy     left first  MTP joint   • H/O screening mammography 06/24/2014   • Hallux limitus of right foot    • Hernia of abdominal wall    • Hyperlipidemia    • Irreducible incisional hernia     vs lipoma   • Lumbago with sciatica    • Lumbago-sciatica due to displacement of lumbar intervertebral disc    • Protrusion of cervical intervertebral disc     Broad based intervertebral disc protrusion - C5-6, minimally impressing upon ventral thecal sac by MRI done 4/7/15    • Pruritic rash    • Systolic murmur    • Vitamin B12 deficiency    • Weight increase        PAST SURGICAL HISTORY:  Past Surgical History:   Procedure Laterality Date   • BREAST BIOPSY Left    • CARPAL TUNNEL RELEASE Bilateral 1995   • CHOLECYSTECTOMY  2004   • INJECTION OF MEDICATION  11/17/2015    Bicillin LA 1.2   • INJECTION OF MEDICATION  11/17/2015    depo medrol   • SPLENECTOMY  03/07/2023   • TUBAL ABDOMINAL LIGATION  2004       ALLERGIES:    Allergies   Allergen Reactions   • Morphine And Related Nausea And Vomiting       SOCIAL HISTORY:   Social History     Tobacco Use   • Smoking status: Every Day     Packs/day: 0.50     Types: Cigarettes   • Smokeless tobacco: Never   Substance Use Topics   • Alcohol use: No   • Drug use: No       CURRENT MEDICATIONS:    Current Outpatient Medications   Medication Sig Dispense Refill   • albuterol sulfate HFA (Ventolin HFA) 108 (90 Base) MCG/ACT inhaler Inhale 2 puffs Every 6 (Six) Hours As Needed for Wheezing or Shortness of Air. 18 g 5   • amLODIPine-benazepril (LOTREL 5-20) 5-20 MG per capsule Take 1 capsule by mouth Daily. 90 capsule 3   • atomoxetine (STRATTERA) 25 MG capsule TAKE 1 CAPSULE BY MOUTH DAILY. 90 capsule 5   • atorvastatin (LIPITOR) 10 MG tablet TAKE 1 TABLET BY MOUTH DAILY. 90 tablet 5   • benzonatate (TESSALON) 200 MG capsule Take 1 capsule by mouth 3 (Three) Times a Day As Needed for Cough. 30 capsule 2   • denosumab (Prolia) 60 MG/ML solution prefilled syringe syringe Inject 1 mL under the skin into the  appropriate area as directed Every 6 (Six) Months. 1 mL 1   • docusate sodium (COLACE) 100 MG capsule Take 1 capsule by mouth Every 12 (Twelve) Hours. 60 capsule 0   • fluticasone (FLONASE) 50 MCG/ACT nasal spray 2 sprays by Each Nare route Daily. 16 g 5   • glucose blood test strip Patient is testing 2xs daily 100 each 12   • glucose monitor monitoring kit 1 each 2 (two) times a day. Patient is testing 2 xs daily 1 each 0   • HYDROcodone-acetaminophen (NORCO)  MG per tablet Take 1 tablet by mouth 4 (Four) Times a Day As Needed.     • Lancets Misc. misc Patient is testing 2 xs daily 100 each 11   • meloxicam (MOBIC) 15 MG tablet Take 1 tablet by mouth Daily. 30 tablet 5   • memantine (NAMENDA) 5 MG tablet 2 (Two) Times a Day.     • Ozempic, 0.25 or 0.5 MG/DOSE, 2 MG/1.5ML solution pen-injector INJECT 0.5 MG UNDER THE SKIN INTO THE APPROPRIATE AREA AS DIRECTED 1 (ONE) TIME PER WEEK. 1.5 mL 5   • Pain Reliever Extra Strength 500 MG tablet Take 1 tablet by mouth Every 6 (Six) Hours As Needed. for pain     • pantoprazole (PROTONIX) 40 MG EC tablet Take 1 tablet by mouth Daily for 120 days. 30 tablet 3   • pregabalin (LYRICA) 150 MG capsule TAKE 1 CAPSULE BY MOUTH 3 (THREE) TIMES A DAY. 90 capsule 0   • Repatha SureClick solution auto-injector SureClick injection INJECT 1 ML UNDER THE SKIN INTO THE APPROPRIATE AREA AS DIRECTED EVERY 14 (FOURTEEN) DAYS. 2 mL 5   • tiZANidine (ZANAFLEX) 4 MG tablet Take 1 tablet by mouth Every 8 (Eight) Hours As Needed for Muscle Spasms. 270 tablet 5   • vitamin D (ERGOCALCIFEROL) 1.25 MG (70286 UT) capsule capsule Take 1 capsule by mouth 2 (Two) Times a Week. 8 capsule 5     Current Facility-Administered Medications   Medication Dose Route Frequency Provider Last Rate Last Admin   • cyanocobalamin injection 1,000 mcg  1,000 mcg Intramuscular Q14 Days Radha Simental MD   1,000 mcg at 07/07/21 1421   • cyanocobalamin injection 1,000 mcg  1,000 mcg Intramuscular Q14 Days Kamille  MD Radha            HOME MEDICATIONS:   Current Outpatient Medications on File Prior to Visit   Medication Sig Dispense Refill   • albuterol sulfate HFA (Ventolin HFA) 108 (90 Base) MCG/ACT inhaler Inhale 2 puffs Every 6 (Six) Hours As Needed for Wheezing or Shortness of Air. 18 g 5   • amLODIPine-benazepril (LOTREL 5-20) 5-20 MG per capsule Take 1 capsule by mouth Daily. 90 capsule 3   • atomoxetine (STRATTERA) 25 MG capsule TAKE 1 CAPSULE BY MOUTH DAILY. 90 capsule 5   • atorvastatin (LIPITOR) 10 MG tablet TAKE 1 TABLET BY MOUTH DAILY. 90 tablet 5   • benzonatate (TESSALON) 200 MG capsule Take 1 capsule by mouth 3 (Three) Times a Day As Needed for Cough. 30 capsule 2   • denosumab (Prolia) 60 MG/ML solution prefilled syringe syringe Inject 1 mL under the skin into the appropriate area as directed Every 6 (Six) Months. 1 mL 1   • docusate sodium (COLACE) 100 MG capsule Take 1 capsule by mouth Every 12 (Twelve) Hours. 60 capsule 0   • fluticasone (FLONASE) 50 MCG/ACT nasal spray 2 sprays by Each Nare route Daily. 16 g 5   • glucose blood test strip Patient is testing 2xs daily 100 each 12   • glucose monitor monitoring kit 1 each 2 (two) times a day. Patient is testing 2 xs daily 1 each 0   • HYDROcodone-acetaminophen (NORCO)  MG per tablet Take 1 tablet by mouth 4 (Four) Times a Day As Needed.     • Lancets Misc. [facility] Patient is testing 2 xs daily 100 each 11   • meloxicam (MOBIC) 15 MG tablet Take 1 tablet by mouth Daily. 30 tablet 5   • memantine (NAMENDA) 5 MG tablet 2 (Two) Times a Day.     • Ozempic, 0.25 or 0.5 MG/DOSE, 2 MG/1.5ML solution pen-injector INJECT 0.5 MG UNDER THE SKIN INTO THE APPROPRIATE AREA AS DIRECTED 1 (ONE) TIME PER WEEK. 1.5 mL 5   • Pain Reliever Extra Strength 500 MG tablet Take 1 tablet by mouth Every 6 (Six) Hours As Needed. for pain     • pantoprazole (PROTONIX) 40 MG EC tablet Take 1 tablet by mouth Daily for 120 days. 30 tablet 3   • pregabalin (LYRICA) 150 MG capsule TAKE 1  CAPSULE BY MOUTH 3 (THREE) TIMES A DAY. 90 capsule 0   • Repatha SureClick solution auto-injector SureClick injection INJECT 1 ML UNDER THE SKIN INTO THE APPROPRIATE AREA AS DIRECTED EVERY 14 (FOURTEEN) DAYS. 2 mL 5   • tiZANidine (ZANAFLEX) 4 MG tablet Take 1 tablet by mouth Every 8 (Eight) Hours As Needed for Muscle Spasms. 270 tablet 5   • vitamin D (ERGOCALCIFEROL) 1.25 MG (83928 UT) capsule capsule Take 1 capsule by mouth 2 (Two) Times a Week. 8 capsule 5     Current Facility-Administered Medications on File Prior to Visit   Medication Dose Route Frequency Provider Last Rate Last Admin   • cyanocobalamin injection 1,000 mcg  1,000 mcg Intramuscular Q14 Days Radha Simental MD   1,000 mcg at 07/07/21 1421   • cyanocobalamin injection 1,000 mcg  1,000 mcg Intramuscular Q14 Days Radha Simental MD           FAMILY HISTORY:    Family History   Problem Relation Age of Onset   • ADD / ADHD Other    • Bipolar disorder Other    • Cancer Other    • Coronary artery disease Other    • Diabetes Other    • Heart disease Other    • Hyperlipidemia Other    • Hypertension Other    • Lung cancer Other    • Migraines Other    • Polycystic ovary syndrome Other    • Schizophrenia Other    • Stroke Other    • Tuberculosis Other    • Other Other         respiratory disorder; smoking tobacco   • Breast cancer Paternal Grandmother    • Hypertension Mother    • Cancer Father    • Breast cancer Father        REVIEW OF SYSTEMS:        CONSTITUTIONAL:  Complains of fatigue.  Positive for 40 pound weight loss in last 1 year.  Denies any fever, chills .     EYES: No visual disturbances. No discharge. No new lesion.    ENMT:  No epistaxis, mouth sores or difficulty swallowing.    RESPIRATORY: Positive for shortness of breath.  Positive for cough productive of clear sputum.  No hemoptysis    CARDIOVASCULAR:  No chest pain or palpitations.    GASTROINTESTINAL:  No abdominal pain nausea, vomiting or blood in the stool.    GENITOURINARY: No  dysuria or hematuria.    MUSCULOSKELETAL: Positive for chronic back pain.  Positive for bilateral lower extremity pain.    LYMPHATICS:  Denies any abnormal swollen glands anywhere in the body.    NEUROLOGICAL : No tingling or numbness. No headache or dizziness. No seizures or balance problems.    SKIN: No new skin lesion.    ENDOCRINE : No new hot or cold intolerance. No new polyuria. No polydipsia.        PHYSICAL EXAMINATION:      VITAL SIGNS:  /71   Pulse 91   Temp 97.8 °F (36.6 °C)   Resp 18   Wt 51.1 kg (112 lb 9.6 oz)   SpO2 94%   BMI 21.99 kg/m²       04/19/23  1522   Weight: 51.1 kg (112 lb 9.6 oz)       ECOG performance status: 1    CONSTITUTIONAL:  Not in any distress.    RESPIRATORY:  Fair air entry bilateral. No rhonchi or wheezing. Fair respiratory effort.    CARDIOVASCULAR:  S1, S2. Regular rate and rhythm. No murmur or gallop appreciated.    MUSCULOSKELETAL:  Trace edema. No calf tenderness.  Decreased range of motion.    NEUROLOGIC:    No motor  deficit appreciated. Cranial nerves II-XII grossly intact.    SKIN : Xanthelasma present on bilateral Nplate. Skin is warm and dry to touch.    LYMPHATICS: No new enlarged lymph nodes in neck or supraclavicular area.    PSYCHIATRY: Alert, awake and oriented ×3.            DIAGNOSTIC DATA:    WBC   Date Value Ref Range Status   04/17/2023 13.46 (H) 3.40 - 10.80 10*3/mm3 Final     RBC   Date Value Ref Range Status   04/17/2023 3.45 (L) 3.77 - 5.28 10*6/mm3 Final     Hemoglobin   Date Value Ref Range Status   04/17/2023 8.9 (L) 12.0 - 15.9 g/dL Final   03/08/2023 9.4 (L) 12.7 - 14.7 g/dL Final     Hematocrit   Date Value Ref Range Status   04/17/2023 29.2 (L) 34.0 - 46.6 % Final   03/08/2023 28.9 (L) 37.9 - 43.9 % Final     MCV   Date Value Ref Range Status   04/17/2023 84.6 79.0 - 97.0 fL Final     MCH   Date Value Ref Range Status   04/17/2023 25.8 (L) 26.6 - 33.0 pg Final     MCHC   Date Value Ref Range Status   04/17/2023 30.5 (L) 31.5 - 35.7  g/dL Final     RDW   Date Value Ref Range Status   04/17/2023 17.4 (H) 12.3 - 15.4 % Final     RDW-SD   Date Value Ref Range Status   04/17/2023 51.9 37.0 - 54.0 fl Final     MPV   Date Value Ref Range Status   04/17/2023 8.3 6.0 - 12.0 fL Final     Platelets   Date Value Ref Range Status   04/17/2023 1,062 (C) 140 - 450 10*3/mm3 Final     Neutrophil %   Date Value Ref Range Status   04/10/2023 68.0 42.7 - 76.0 % Final     Lymphocyte %   Date Value Ref Range Status   04/10/2023 19.7 19.6 - 45.3 % Final     Monocyte %   Date Value Ref Range Status   04/10/2023 11.1 5.0 - 12.0 % Final     Eosinophil %   Date Value Ref Range Status   04/10/2023 0.2 (L) 0.3 - 6.2 % Final     Basophil %   Date Value Ref Range Status   04/10/2023 0.4 0.0 - 1.5 % Final     Neutrophils Absolute   Date Value Ref Range Status   04/17/2023 9.29 (H) 1.70 - 7.00 10*3/mm3 Final     Neutrophils, Absolute   Date Value Ref Range Status   04/10/2023 9.23 (H) 1.70 - 7.00 10*3/mm3 Final     Lymphocytes, Absolute   Date Value Ref Range Status   04/10/2023 2.67 0.70 - 3.10 10*3/mm3 Final     Monocytes, Absolute   Date Value Ref Range Status   04/10/2023 1.51 (H) 0.10 - 0.90 10*3/mm3 Final     Eosinophils, Absolute   Date Value Ref Range Status   04/10/2023 0.03 0.00 - 0.40 10*3/mm3 Final     Basophils, Absolute   Date Value Ref Range Status   04/10/2023 0.05 0.00 - 0.20 10*3/mm3 Final     nRBC   Date Value Ref Range Status   06/24/2014 0  Final   06/24/2014 0  Final     Glucose   Date Value Ref Range Status   03/28/2023 117 (H) 70 - 99 mg/dL Final     Sodium   Date Value Ref Range Status   04/11/2023 136 136 - 145 mmol/L Final     Potassium   Date Value Ref Range Status   04/11/2023 3.1 (L) 3.5 - 5.1 mmol/L Final     CO2   Date Value Ref Range Status   03/28/2023 29.0 22.0 - 30.0 mmol/L Final     Chloride   Date Value Ref Range Status   04/11/2023 98 98 - 107 mmol/L Final     Anion Gap   Date Value Ref Range Status   03/28/2023 8.0 5.0 - 15.0 mmol/L  Final     Creatinine   Date Value Ref Range Status   04/11/2023 0.7 0.6 - 1.0 mg/dL Final     BUN   Date Value Ref Range Status   04/11/2023 6 (L) 7 - 18 mg/dL Final     BUN/Creatinine Ratio   Date Value Ref Range Status   03/28/2023 10.7 7.0 - 25.0 Final     Calcium   Date Value Ref Range Status   04/11/2023 9.2 8.5 - 10.1 mg/dL Final     eGFR Non  Amer   Date Value Ref Range Status   07/01/2021 80 51 - 120 mL/min/1.73 Final     Alkaline Phosphatase   Date Value Ref Range Status   04/11/2023 97 46 - 116 U/L Final     Total Protein   Date Value Ref Range Status   04/11/2023 7.6 6.4 - 8.2 g/dL Final     ALT (SGPT)   Date Value Ref Range Status   04/11/2023 11 (L) 14 - 59 U/L Final     AST (SGOT)   Date Value Ref Range Status   04/11/2023 10 (L) 15 - 37 U/L Final     Total Bilirubin   Date Value Ref Range Status   04/11/2023 0.20 0.20 - 1.00 mg/dL Final     Albumin   Date Value Ref Range Status   04/11/2023 1.9 (L) 3.4 - 5.0 g/dL Final     Globulin   Date Value Ref Range Status   03/28/2023 4.3 (H) 2.3 - 3.5 gm/dL Final     Lab Results   Component Value Date    GONPBBVQ05 327 03/28/2023    FOLATE 5.27 08/02/2022     No results found for: , LABCA2, AFPTM, HCGQUANT, , CHROMGRNA, [id number], CEA, REFLABREPO]    Radiology Data :  XR Chest 1 View    Result Date: 4/11/2023    Greater than 6 cm mass/mass opacity of the right hilum. Mediastinal adenopathy. This is partly visualized and described on CT abdomen March 6. If not previously performed recommend dedicated CT chest with contrast. Workstation: [id number]    US Venous Doppler Lower Extremity Bilateral (duplex)    Result Date: 4/11/2023    No evidence of deep vein thrombosis bilateral lower extremities. Workstation: [id number]    CTA Chest Pulmonary Embolism w/Contrast per Contrast Protocol    Result Date: 4/11/2023         1.  No pulmonary embolism.  No evidence of right heart strain. 2.  Stable right perihilar mass measuring approximately 5.1 x 4.5 x 4.7  cm surrounding the right pulmonary artery and pulmonary veins. The mass extends extends along the right bronchus with associated bronchial wall thickening, extension of the mass into  the subcarinal region and the anterior right paratracheal region.  Findings are concerning for malignancy with involvement of the mediastinal lymph nodes. 3.  Stable 0.9 x 0.8 cm pulmonary nodule in the left lower lobe unchanged when compared to prior examination. Recommend attention on follow-up examination. 4.  Nonspecific stable bronchial wall thickening noted. Workstation: [id number]      Pathology :  * Cannot find OR log *    Assessment and plan:    1.  Thrombocytosis:  - Recent platelet count is 1062,000.  - Patient's platelet count prior to splenic rupture was normal last year.  - Differential diagnosis for thrombocytosis includes reactive secondary to splenectomy versus possible developing iron deficiency due to bleeding from splenic rupture.  Bone marrow pathology is less likely at this point since it is all acute since her splenectomy.  - Due to extreme thrombocytosis recommend starting aspirin 81 mg p.o. daily for thromboprophylaxis.  - We will check iron studies and folate level today.  - We will have patient return to clinic in 3 weeks after PET/CT and possible bronchoscopy and biopsy to go over the results.    2.  Anemia:  - Secondary to splenectomy and blood loss from splenic rupture.  - B12 level is 328.  Patient has not been taking B12 injection regularly.  - Recommend starting B12 1000 mcg p.o. daily.  We will also check iron studies and folate to rule out other nutritional deficiency.  -Anemia work-up done today shows hemoglobin is 8.2.  Iron saturation is only 5% with iron level of 9.  Recommend starting ferrous gluconate p.o. daily with stool softener as required.  - Patient had was counseled about side effect of oral iron including severe stomach upset and constipation.  She was encouraged to call our office if  she cannot tolerate iron by mouth.    3.  Leukocytosis:  - Multifactorial secondary to smoking plus splenectomy plus or minus reactive due to lung mass.  - White blood cell count is 13,000 which is predominantly increase in neutrophil.  Will monitor with CBC for now    4.  Right lung mass:  - CT scan was obtained [facility] and reviewed.  CT scan does show about 5 cm perihilar mass on right side extension into mediastinum worrisome for lymphadenopathy.  - Patient has upcoming PET/CT on April 24, 2023 which has been ordered by Dr. Porter.  - Discussed with patient and her daughters that if PET/CT has any abnormal uptake worrisome for malignancy she will need bronchoscopy for tissue diagnosis.  - For now we will provide her 3-week appointments to go over the result of PET/CT and possible biopsy.    5.  Hypertension    6.  Diabetes mellitus on Ozempic    7. Isabel Villalba  reports that she has been smoking cigarettes. She has been smoking an average of .5 packs per day. She has never used smokeless tobacco.. I have educated her on the risk of diseases from using tobacco products such as cancer, COPD, heart disease and cataracts.     I advised her to quit and she is willing to quit. We have discussed the following method/s for tobacco cessation:  Counseling.  Together we have set a quit date for 1 month from today.  She will follow up with me in 3 weeks or sooner to check on her progress.    I spent 3  minutes counseling the patient.       8.  Health maintenance: Patient never had a screening colonoscopy done, she was counseled about importance of screening colonoscopy.  Has not had a Pap smear for greater than 30 years was counseled about importance of screening Pap smear.  She is up-to-date with her mammogram        9.  Prescription: Prescription for ferrous gluconate, Colace, B12 has been sent to patient's pharmacy today    About 60 minutes were spent for consultation including obtaining,  reviewing records, discussing result of imaging, blood work possible differential diagnosis, need for further imaging for lung mass and possible biopsy as well as answering their questions.        PHQ-9 Total Score: 0   -Patient is not homicidal or suicidal.  No acute intervention required.      Isabel Villalba reports a pain score of 8.  Given her pain assessment as noted, treatment options were discussed and the following options were decided upon as a follow-up plan to address the patient's pain: continuation of current treatment plan for pain.             Thank you for this consultation.    Brett Duckworth MD  4/19/2023  15:26 CDT        Part of this note may be an electronic transcription/translation of spoken language to printed text using the Dragon Dictation System.

## 2023-04-20 ENCOUNTER — TELEPHONE (OUTPATIENT)
Dept: ONCOLOGY | Facility: HOSPITAL | Age: 58
End: 2023-04-20
Payer: MEDICARE

## 2023-04-20 LAB — FOLATE SERPL-MCNC: >20 NG/ML (ref 4.78–24.2)

## 2023-04-20 NOTE — TELEPHONE ENCOUNTER
Pt called in response to VM left. Made pt aware of lab results/supplements/prescriptions per Dr. Duckworth. V/u obtained.

## 2023-04-20 NOTE — TELEPHONE ENCOUNTER
----- Message from Brett Duckworth MD sent at 4/20/2023  6:51 AM CDT -----  Please let patient know, her hemoglobin today was 8.2.  Iron studies are consistent with severe iron deficiency.  Recommend starting ferrous gluconate p.o. daily with stool softener as required.  Recommend starting B12 p.o. daily.  Folate level is adequate no need for any folic acid supplementation at present.  Prescription for ferrous gluconate, Colace and B12 has been sent to patient's pharmacy today.  Thank you

## 2023-04-24 ENCOUNTER — LAB (OUTPATIENT)
Dept: LAB | Facility: OTHER | Age: 58
End: 2023-04-24
Payer: MEDICARE

## 2023-04-24 ENCOUNTER — HOSPITAL ENCOUNTER (OUTPATIENT)
Dept: PET IMAGING | Facility: HOSPITAL | Age: 58
Discharge: HOME | End: 2023-04-24
Admitting: THORACIC SURGERY (CARDIOTHORACIC VASCULAR SURGERY)
Payer: MEDICARE

## 2023-04-24 DIAGNOSIS — R91.1 LUNG NODULE: ICD-10-CM

## 2023-04-24 DIAGNOSIS — S36.09XA SPLENIC RUPTURE: ICD-10-CM

## 2023-04-24 LAB
ANISOCYTOSIS BLD QL: ABNORMAL
DEPRECATED RDW RBC AUTO: 52.7 FL (ref 37–54)
EOSINOPHIL NFR BLD MANUAL: 1 % (ref 0.3–6.2)
ERYTHROCYTE [DISTWIDTH] IN BLOOD BY AUTOMATED COUNT: 17.8 % (ref 12.3–15.4)
HCT VFR BLD AUTO: 27.4 % (ref 34–46.6)
HGB BLD-MCNC: 8.3 G/DL (ref 12–15.9)
HYPOCHROMIA BLD QL: ABNORMAL
LARGE PLATELETS: ABNORMAL
LYMPHOCYTES # BLD MANUAL: ABNORMAL 10*3/UL
LYMPHOCYTES NFR BLD MANUAL: 9 % (ref 5–12)
MCH RBC QN AUTO: 25.4 PG (ref 26.6–33)
MCHC RBC AUTO-ENTMCNC: 30.3 G/DL (ref 31.5–35.7)
MCV RBC AUTO: 83.8 FL (ref 79–97)
METAMYELOCYTES NFR BLD MANUAL: 2 % (ref 0–0)
NEUTROPHILS # BLD AUTO: ABNORMAL 10*3/UL
NEUTROPHILS NFR BLD MANUAL: 57 % (ref 42.7–76)
NEUTS BAND NFR BLD MANUAL: 8 % (ref 0–5)
PLATELET # BLD AUTO: 967 10*3/MM3 (ref 140–450)
PMV BLD AUTO: 8.5 FL (ref 6–12)
RBC # BLD AUTO: 3.27 10*6/MM3 (ref 3.77–5.28)
SMALL PLATELETS BLD QL SMEAR: ABNORMAL
VARIANT LYMPHS NFR BLD MANUAL: 23 % (ref 19.6–45.3)
WBC MORPH BLD: NORMAL
WBC NRBC COR # BLD: 13.84 10*3/MM3 (ref 3.4–10.8)

## 2023-04-24 PROCEDURE — 36415 COLL VENOUS BLD VENIPUNCTURE: CPT | Performed by: FAMILY MEDICINE

## 2023-04-24 PROCEDURE — 85007 BL SMEAR W/DIFF WBC COUNT: CPT | Performed by: FAMILY MEDICINE

## 2023-04-24 PROCEDURE — A9552 F18 FDG: HCPCS | Performed by: THORACIC SURGERY (CARDIOTHORACIC VASCULAR SURGERY)

## 2023-04-24 PROCEDURE — 85025 COMPLETE CBC W/AUTO DIFF WBC: CPT | Performed by: FAMILY MEDICINE

## 2023-04-24 PROCEDURE — 0 FLUDEOXYGLUCOSE F18 SOLUTION: Performed by: THORACIC SURGERY (CARDIOTHORACIC VASCULAR SURGERY)

## 2023-04-24 PROCEDURE — 78815 PET IMAGE W/CT SKULL-THIGH: CPT

## 2023-04-24 RX ADMIN — SODIUM FLUORIDE F 18 1 DOSE: 200 INJECTION, SOLUTION INTRAVENOUS at 14:35

## 2023-04-25 DIAGNOSIS — M51.27 LUMBAGO-SCIATICA DUE TO DISPLACEMENT OF LUMBAR INTERVERTEBRAL DISC: Primary | ICD-10-CM

## 2023-04-26 ENCOUNTER — PROCEDURE VISIT (OUTPATIENT)
Dept: PULMONOLOGY | Facility: CLINIC | Age: 58
End: 2023-04-26
Payer: MEDICARE

## 2023-04-26 ENCOUNTER — TELEPHONE (OUTPATIENT)
Dept: FAMILY MEDICINE CLINIC | Facility: CLINIC | Age: 58
End: 2023-04-26

## 2023-04-26 ENCOUNTER — OFFICE VISIT (OUTPATIENT)
Dept: CARDIAC SURGERY | Facility: CLINIC | Age: 58
End: 2023-04-26
Payer: MEDICARE

## 2023-04-26 VITALS
OXYGEN SATURATION: 93 % | DIASTOLIC BLOOD PRESSURE: 62 MMHG | HEART RATE: 100 BPM | WEIGHT: 118 LBS | SYSTOLIC BLOOD PRESSURE: 130 MMHG | BODY MASS INDEX: 23.16 KG/M2 | HEIGHT: 60 IN

## 2023-04-26 DIAGNOSIS — I10 ESSENTIAL HYPERTENSION: ICD-10-CM

## 2023-04-26 DIAGNOSIS — R91.1 LUNG NODULE: ICD-10-CM

## 2023-04-26 DIAGNOSIS — F17.218 NICOTINE DEPENDENCE, CIGARETTES, WITH OTHER NICOTINE-INDUCED DISORDERS: ICD-10-CM

## 2023-04-26 DIAGNOSIS — E78.2 MIXED HYPERLIPIDEMIA: ICD-10-CM

## 2023-04-26 DIAGNOSIS — R91.8 LUNG MASS: Primary | ICD-10-CM

## 2023-04-26 RX ORDER — BUPIVACAINE HCL/0.9 % NACL/PF 0.1 %
2 PLASTIC BAG, INJECTION (ML) EPIDURAL ONCE
Status: CANCELLED | OUTPATIENT
Start: 2023-05-03 | End: 2023-04-26

## 2023-04-26 RX ORDER — SODIUM CHLORIDE 0.9 % (FLUSH) 0.9 %
10 SYRINGE (ML) INJECTION EVERY 12 HOURS SCHEDULED
Status: CANCELLED | OUTPATIENT
Start: 2023-05-03

## 2023-04-26 RX ORDER — SODIUM CHLORIDE 9 MG/ML
100 INJECTION, SOLUTION INTRAVENOUS CONTINUOUS
Status: CANCELLED | OUTPATIENT
Start: 2023-05-03

## 2023-04-26 RX ORDER — SODIUM CHLORIDE 9 MG/ML
40 INJECTION, SOLUTION INTRAVENOUS AS NEEDED
Status: CANCELLED | OUTPATIENT
Start: 2023-05-03

## 2023-04-26 RX ORDER — SODIUM CHLORIDE 0.9 % (FLUSH) 0.9 %
10 SYRINGE (ML) INJECTION AS NEEDED
Status: CANCELLED | OUTPATIENT
Start: 2023-05-03

## 2023-04-26 NOTE — PROGRESS NOTES
FULL PFT WITH BRONCHODILATOR PERFORMED.     GOOD PATIENT EFFORT AND COOPERATION.    6 SECOND EXHALATION ON SPRIOMETRY.    ORDERED BY DR. HOLLINS, READ BY DR. FELTON

## 2023-04-27 RX ORDER — HYDROCODONE BITARTRATE AND ACETAMINOPHEN 10; 325 MG/1; MG/1
1 TABLET ORAL 4 TIMES DAILY PRN
Qty: 120 TABLET | Refills: 0 | OUTPATIENT
Start: 2023-04-27

## 2023-04-27 NOTE — TELEPHONE ENCOUNTER
I declined request for Norco. Norco scripts have been coming from other providers, not Dr Simental. Dr Simental reviewed her Maurisio earlier this month and did not prescribe Norco. EB

## 2023-05-01 ENCOUNTER — LAB (OUTPATIENT)
Dept: LAB | Facility: OTHER | Age: 58
End: 2023-05-01
Payer: MEDICARE

## 2023-05-01 ENCOUNTER — PRE-ADMISSION TESTING (OUTPATIENT)
Dept: PREADMISSION TESTING | Facility: HOSPITAL | Age: 58
End: 2023-05-01
Payer: MEDICARE

## 2023-05-01 DIAGNOSIS — R91.8 LUNG MASS: ICD-10-CM

## 2023-05-01 DIAGNOSIS — S36.09XA SPLENIC RUPTURE: ICD-10-CM

## 2023-05-01 LAB
ABO GROUP BLD: NORMAL
ANION GAP SERPL CALCULATED.3IONS-SCNC: 12 MMOL/L (ref 5–15)
ANISOCYTOSIS BLD QL: ABNORMAL
BASOPHILS # BLD AUTO: 0.05 10*3/MM3 (ref 0–0.2)
BASOPHILS NFR BLD AUTO: 0.3 % (ref 0–1.5)
BLD GP AB SCN SERPL QL: NEGATIVE
BUN SERPL-MCNC: 10 MG/DL (ref 6–20)
BUN/CREAT SERPL: 16.9 (ref 7–25)
CALCIUM SPEC-SCNC: 10 MG/DL (ref 8.6–10.5)
CHLORIDE SERPL-SCNC: 98 MMOL/L (ref 98–107)
CO2 SERPL-SCNC: 27 MMOL/L (ref 22–29)
CREAT SERPL-MCNC: 0.59 MG/DL (ref 0.57–1)
DEPRECATED RDW RBC AUTO: 51 FL (ref 37–54)
EGFRCR SERPLBLD CKD-EPI 2021: 104.6 ML/MIN/1.73
EOSINOPHIL # BLD AUTO: 0.04 10*3/MM3 (ref 0–0.4)
EOSINOPHIL NFR BLD AUTO: 0.3 % (ref 0.3–6.2)
EOSINOPHIL NFR BLD MANUAL: 1 % (ref 0.3–6.2)
ERYTHROCYTE [DISTWIDTH] IN BLOOD BY AUTOMATED COUNT: 17.7 % (ref 12.3–15.4)
GLUCOSE SERPL-MCNC: 152 MG/DL (ref 65–99)
HCT VFR BLD AUTO: 26.8 % (ref 34–46.6)
HGB BLD-MCNC: 8.2 G/DL (ref 12–15.9)
LYMPHOCYTES # BLD AUTO: 3.75 10*3/MM3 (ref 0.7–3.1)
LYMPHOCYTES # BLD MANUAL: ABNORMAL 10*3/UL
LYMPHOCYTES NFR BLD AUTO: 23.9 % (ref 19.6–45.3)
LYMPHOCYTES NFR BLD MANUAL: 11 % (ref 5–12)
Lab: NORMAL
MCH RBC QN AUTO: 25.1 PG (ref 26.6–33)
MCHC RBC AUTO-ENTMCNC: 30.6 G/DL (ref 31.5–35.7)
MCV RBC AUTO: 82 FL (ref 79–97)
MONOCYTES # BLD AUTO: 2.23 10*3/MM3 (ref 0.1–0.9)
MONOCYTES NFR BLD AUTO: 14.2 % (ref 5–12)
NEUTROPHILS # BLD AUTO: ABNORMAL 10*3/UL
NEUTROPHILS NFR BLD AUTO: 61.3 % (ref 42.7–76)
NEUTROPHILS NFR BLD AUTO: 9.59 10*3/MM3 (ref 1.7–7)
NEUTROPHILS NFR BLD MANUAL: 57 % (ref 42.7–76)
NEUTS BAND NFR BLD MANUAL: 2 % (ref 0–5)
NRBC SPEC MANUAL: 1 /100 WBC (ref 0–0.2)
PLATELET # BLD AUTO: 1012 10*3/MM3 (ref 140–450)
PMV BLD AUTO: 8.5 FL (ref 6–12)
POTASSIUM SERPL-SCNC: 3.6 MMOL/L (ref 3.5–5.2)
QT INTERVAL: 356 MS
QTC INTERVAL: 435 MS
RBC # BLD AUTO: 3.27 10*6/MM3 (ref 3.77–5.28)
RH BLD: NEGATIVE
SMALL PLATELETS BLD QL SMEAR: ABNORMAL
SODIUM SERPL-SCNC: 137 MMOL/L (ref 136–145)
T&S EXPIRATION DATE: NORMAL
TARGETS BLD QL SMEAR: ABNORMAL
VARIANT LYMPHS NFR BLD MANUAL: 29 % (ref 19.6–45.3)
WBC MORPH BLD: NORMAL
WBC NRBC COR # BLD: 15.66 10*3/MM3 (ref 3.4–10.8)

## 2023-05-01 PROCEDURE — 85025 COMPLETE CBC W/AUTO DIFF WBC: CPT | Performed by: FAMILY MEDICINE

## 2023-05-01 PROCEDURE — 93005 ELECTROCARDIOGRAM TRACING: CPT

## 2023-05-01 PROCEDURE — 86900 BLOOD TYPING SEROLOGIC ABO: CPT

## 2023-05-01 PROCEDURE — 85007 BL SMEAR W/DIFF WBC COUNT: CPT | Performed by: FAMILY MEDICINE

## 2023-05-01 PROCEDURE — 80048 BASIC METABOLIC PNL TOTAL CA: CPT

## 2023-05-01 PROCEDURE — 86850 RBC ANTIBODY SCREEN: CPT

## 2023-05-01 PROCEDURE — 86901 BLOOD TYPING SEROLOGIC RH(D): CPT

## 2023-05-01 PROCEDURE — 36415 COLL VENOUS BLD VENIPUNCTURE: CPT | Performed by: FAMILY MEDICINE

## 2023-05-01 RX ORDER — HYDROCODONE BITARTRATE AND ACETAMINOPHEN 10; 325 MG/1; MG/1
1 TABLET ORAL 4 TIMES DAILY PRN
Qty: 120 TABLET | Refills: 0 | Status: SHIPPED | OUTPATIENT
Start: 2023-05-01

## 2023-05-01 NOTE — TELEPHONE ENCOUNTER
Requesting refills on norco.. patient states since she is a cancer patient, dr deleon agreed to write her pain meds

## 2023-05-02 ENCOUNTER — TELEPHONE (OUTPATIENT)
Dept: FAMILY MEDICINE CLINIC | Facility: CLINIC | Age: 58
End: 2023-05-02
Payer: MEDICARE

## 2023-05-02 ENCOUNTER — ANESTHESIA EVENT (OUTPATIENT)
Dept: PERIOP | Facility: HOSPITAL | Age: 58
End: 2023-05-02
Payer: MEDICARE

## 2023-05-02 DIAGNOSIS — R05.9 COUGH: ICD-10-CM

## 2023-05-02 RX ORDER — BENZONATATE 200 MG/1
200 CAPSULE ORAL 3 TIMES DAILY PRN
Qty: 30 CAPSULE | Refills: 2 | Status: SHIPPED | OUTPATIENT
Start: 2023-05-02

## 2023-05-03 ENCOUNTER — ANESTHESIA (OUTPATIENT)
Dept: PERIOP | Facility: HOSPITAL | Age: 58
End: 2023-05-03
Payer: MEDICARE

## 2023-05-03 ENCOUNTER — APPOINTMENT (OUTPATIENT)
Dept: GENERAL RADIOLOGY | Facility: HOSPITAL | Age: 58
End: 2023-05-03
Payer: MEDICARE

## 2023-05-03 ENCOUNTER — HOSPITAL ENCOUNTER (OUTPATIENT)
Facility: HOSPITAL | Age: 58
Setting detail: HOSPITAL OUTPATIENT SURGERY
Discharge: HOME | End: 2023-05-03
Attending: THORACIC SURGERY (CARDIOTHORACIC VASCULAR SURGERY) | Admitting: THORACIC SURGERY (CARDIOTHORACIC VASCULAR SURGERY)
Payer: MEDICARE

## 2023-05-03 VITALS
TEMPERATURE: 98.6 F | SYSTOLIC BLOOD PRESSURE: 103 MMHG | HEART RATE: 87 BPM | OXYGEN SATURATION: 97 % | WEIGHT: 113.1 LBS | RESPIRATION RATE: 18 BRPM | BODY MASS INDEX: 22.2 KG/M2 | HEIGHT: 60 IN | DIASTOLIC BLOOD PRESSURE: 57 MMHG

## 2023-05-03 DIAGNOSIS — R91.8 LUNG MASS: ICD-10-CM

## 2023-05-03 LAB
ABO GROUP BLD: NORMAL
BLD GP AB SCN SERPL QL: NEGATIVE
GLUCOSE BLDC GLUCOMTR-MCNC: 170 MG/DL (ref 70–130)
GLUCOSE BLDC GLUCOMTR-MCNC: 173 MG/DL (ref 70–130)
Lab: NORMAL
RH BLD: NEGATIVE
T&S EXPIRATION DATE: NORMAL

## 2023-05-03 PROCEDURE — 86901 BLOOD TYPING SEROLOGIC RH(D): CPT | Performed by: ANESTHESIOLOGY

## 2023-05-03 PROCEDURE — 25010000002 DEXAMETHASONE PER 1 MG: Performed by: NURSE ANESTHETIST, CERTIFIED REGISTERED

## 2023-05-03 PROCEDURE — 25010000002 HEPARIN (PORCINE) PER 1000 UNITS: Performed by: THORACIC SURGERY (CARDIOTHORACIC VASCULAR SURGERY)

## 2023-05-03 PROCEDURE — 25010000002 FENTANYL CITRATE (PF) 100 MCG/2ML SOLUTION: Performed by: NURSE ANESTHETIST, CERTIFIED REGISTERED

## 2023-05-03 PROCEDURE — 86923 COMPATIBILITY TEST ELECTRIC: CPT

## 2023-05-03 PROCEDURE — 88112 CYTOPATH CELL ENHANCE TECH: CPT

## 2023-05-03 PROCEDURE — 82948 REAGENT STRIP/BLOOD GLUCOSE: CPT

## 2023-05-03 PROCEDURE — C1788 PORT, INDWELLING, IMP: HCPCS | Performed by: THORACIC SURGERY (CARDIOTHORACIC VASCULAR SURGERY)

## 2023-05-03 PROCEDURE — 25010000002 SUGAMMADEX 200 MG/2ML SOLUTION: Performed by: NURSE ANESTHETIST, CERTIFIED REGISTERED

## 2023-05-03 PROCEDURE — 86900 BLOOD TYPING SEROLOGIC ABO: CPT | Performed by: ANESTHESIOLOGY

## 2023-05-03 PROCEDURE — 25010000002 PHENYLEPHRINE 10 MG/ML SOLUTION: Performed by: NURSE ANESTHETIST, CERTIFIED REGISTERED

## 2023-05-03 PROCEDURE — 76000 FLUOROSCOPY <1 HR PHYS/QHP: CPT

## 2023-05-03 PROCEDURE — 25010000002 HYDROMORPHONE 1 MG/ML SOLUTION: Performed by: NURSE ANESTHETIST, CERTIFIED REGISTERED

## 2023-05-03 PROCEDURE — 25010000002 ONDANSETRON PER 1 MG: Performed by: NURSE ANESTHETIST, CERTIFIED REGISTERED

## 2023-05-03 PROCEDURE — 25010000002 SUCCINYLCHOLINE PER 20 MG: Performed by: NURSE ANESTHETIST, CERTIFIED REGISTERED

## 2023-05-03 PROCEDURE — 0 MEPERIDINE PER 100 MG: Performed by: NURSE ANESTHETIST, CERTIFIED REGISTERED

## 2023-05-03 PROCEDURE — 25010000002 MIDAZOLAM PER 1 MG: Performed by: NURSE ANESTHETIST, CERTIFIED REGISTERED

## 2023-05-03 PROCEDURE — 86850 RBC ANTIBODY SCREEN: CPT | Performed by: ANESTHESIOLOGY

## 2023-05-03 PROCEDURE — C1894 INTRO/SHEATH, NON-LASER: HCPCS | Performed by: THORACIC SURGERY (CARDIOTHORACIC VASCULAR SURGERY)

## 2023-05-03 PROCEDURE — 88305 TISSUE EXAM BY PATHOLOGIST: CPT

## 2023-05-03 PROCEDURE — 25010000002 CEFAZOLIN PER 500 MG: Performed by: THORACIC SURGERY (CARDIOTHORACIC VASCULAR SURGERY)

## 2023-05-03 PROCEDURE — 25010000002 PROPOFOL 200 MG/20ML EMULSION: Performed by: NURSE ANESTHETIST, CERTIFIED REGISTERED

## 2023-05-03 RX ORDER — DIPHENHYDRAMINE HYDROCHLORIDE 50 MG/ML
12.5 INJECTION INTRAMUSCULAR; INTRAVENOUS
Status: DISCONTINUED | OUTPATIENT
Start: 2023-05-03 | End: 2023-05-03 | Stop reason: HOSPADM

## 2023-05-03 RX ORDER — HEPARIN SODIUM (PORCINE) LOCK FLUSH IV SOLN 100 UNIT/ML 100 UNIT/ML
SOLUTION INTRAVENOUS AS NEEDED
Status: DISCONTINUED | OUTPATIENT
Start: 2023-05-03 | End: 2023-05-03 | Stop reason: HOSPADM

## 2023-05-03 RX ORDER — ACETAMINOPHEN 325 MG/1
650 TABLET ORAL ONCE AS NEEDED
Status: DISCONTINUED | OUTPATIENT
Start: 2023-05-03 | End: 2023-05-03 | Stop reason: HOSPADM

## 2023-05-03 RX ORDER — ACETAMINOPHEN 650 MG/1
650 SUPPOSITORY RECTAL ONCE AS NEEDED
Status: DISCONTINUED | OUTPATIENT
Start: 2023-05-03 | End: 2023-05-03 | Stop reason: HOSPADM

## 2023-05-03 RX ORDER — SODIUM CHLORIDE 0.9 % (FLUSH) 0.9 %
10 SYRINGE (ML) INJECTION AS NEEDED
Status: DISCONTINUED | OUTPATIENT
Start: 2023-05-03 | End: 2023-05-03 | Stop reason: HOSPADM

## 2023-05-03 RX ORDER — FENTANYL CITRATE 50 UG/ML
INJECTION, SOLUTION INTRAMUSCULAR; INTRAVENOUS AS NEEDED
Status: DISCONTINUED | OUTPATIENT
Start: 2023-05-03 | End: 2023-05-03 | Stop reason: SURG

## 2023-05-03 RX ORDER — BUPIVACAINE HCL/0.9 % NACL/PF 0.1 %
2 PLASTIC BAG, INJECTION (ML) EPIDURAL ONCE
Status: COMPLETED | OUTPATIENT
Start: 2023-05-03 | End: 2023-05-03

## 2023-05-03 RX ORDER — SODIUM CHLORIDE 9 MG/ML
100 INJECTION, SOLUTION INTRAVENOUS CONTINUOUS
Status: DISCONTINUED | OUTPATIENT
Start: 2023-05-03 | End: 2023-05-03 | Stop reason: HOSPADM

## 2023-05-03 RX ORDER — SODIUM CHLORIDE 0.9 % (FLUSH) 0.9 %
10 SYRINGE (ML) INJECTION EVERY 12 HOURS SCHEDULED
Status: DISCONTINUED | OUTPATIENT
Start: 2023-05-03 | End: 2023-05-03 | Stop reason: HOSPADM

## 2023-05-03 RX ORDER — ONDANSETRON 2 MG/ML
INJECTION INTRAMUSCULAR; INTRAVENOUS AS NEEDED
Status: DISCONTINUED | OUTPATIENT
Start: 2023-05-03 | End: 2023-05-03 | Stop reason: SURG

## 2023-05-03 RX ORDER — SODIUM CHLORIDE 9 MG/ML
40 INJECTION, SOLUTION INTRAVENOUS AS NEEDED
Status: DISCONTINUED | OUTPATIENT
Start: 2023-05-03 | End: 2023-05-03 | Stop reason: HOSPADM

## 2023-05-03 RX ORDER — LIDOCAINE HYDROCHLORIDE 20 MG/ML
INJECTION, SOLUTION EPIDURAL; INFILTRATION; INTRACAUDAL; PERINEURAL AS NEEDED
Status: DISCONTINUED | OUTPATIENT
Start: 2023-05-03 | End: 2023-05-03 | Stop reason: SURG

## 2023-05-03 RX ORDER — IPRATROPIUM BROMIDE AND ALBUTEROL SULFATE 2.5; .5 MG/3ML; MG/3ML
3 SOLUTION RESPIRATORY (INHALATION) ONCE
Status: COMPLETED | OUTPATIENT
Start: 2023-05-03 | End: 2023-05-03

## 2023-05-03 RX ORDER — PHENYLEPHRINE HYDROCHLORIDE 10 MG/ML
INJECTION INTRAVENOUS AS NEEDED
Status: DISCONTINUED | OUTPATIENT
Start: 2023-05-03 | End: 2023-05-03 | Stop reason: SURG

## 2023-05-03 RX ORDER — EPHEDRINE SULFATE 50 MG/ML
INJECTION INTRAVENOUS AS NEEDED
Status: DISCONTINUED | OUTPATIENT
Start: 2023-05-03 | End: 2023-05-03 | Stop reason: SURG

## 2023-05-03 RX ORDER — HEPARIN SODIUM 1000 [USP'U]/ML
INJECTION, SOLUTION INTRAVENOUS; SUBCUTANEOUS AS NEEDED
Status: DISCONTINUED | OUTPATIENT
Start: 2023-05-03 | End: 2023-05-03 | Stop reason: HOSPADM

## 2023-05-03 RX ORDER — ONDANSETRON 4 MG/1
4 TABLET, FILM COATED ORAL ONCE AS NEEDED
Status: DISCONTINUED | OUTPATIENT
Start: 2023-05-03 | End: 2023-05-03 | Stop reason: HOSPADM

## 2023-05-03 RX ORDER — ROCURONIUM BROMIDE 10 MG/ML
INJECTION, SOLUTION INTRAVENOUS AS NEEDED
Status: DISCONTINUED | OUTPATIENT
Start: 2023-05-03 | End: 2023-05-03 | Stop reason: SURG

## 2023-05-03 RX ORDER — ONDANSETRON 2 MG/ML
4 INJECTION INTRAMUSCULAR; INTRAVENOUS ONCE AS NEEDED
Status: DISCONTINUED | OUTPATIENT
Start: 2023-05-03 | End: 2023-05-03 | Stop reason: HOSPADM

## 2023-05-03 RX ORDER — HYDROCODONE BITARTRATE AND ACETAMINOPHEN 7.5; 325 MG/1; MG/1
1 TABLET ORAL ONCE AS NEEDED
Status: DISCONTINUED | OUTPATIENT
Start: 2023-05-03 | End: 2023-05-03 | Stop reason: HOSPADM

## 2023-05-03 RX ORDER — NALOXONE HCL 0.4 MG/ML
0.4 VIAL (ML) INJECTION AS NEEDED
Status: DISCONTINUED | OUTPATIENT
Start: 2023-05-03 | End: 2023-05-03 | Stop reason: HOSPADM

## 2023-05-03 RX ORDER — MIDAZOLAM HYDROCHLORIDE 1 MG/ML
INJECTION INTRAMUSCULAR; INTRAVENOUS AS NEEDED
Status: DISCONTINUED | OUTPATIENT
Start: 2023-05-03 | End: 2023-05-03 | Stop reason: SURG

## 2023-05-03 RX ORDER — PROMETHAZINE HYDROCHLORIDE 25 MG/1
25 TABLET ORAL ONCE AS NEEDED
Status: DISCONTINUED | OUTPATIENT
Start: 2023-05-03 | End: 2023-05-03 | Stop reason: HOSPADM

## 2023-05-03 RX ORDER — EPHEDRINE SULFATE 50 MG/ML
5 INJECTION, SOLUTION INTRAVENOUS ONCE AS NEEDED
Status: DISCONTINUED | OUTPATIENT
Start: 2023-05-03 | End: 2023-05-03 | Stop reason: HOSPADM

## 2023-05-03 RX ORDER — PROPOFOL 10 MG/ML
INJECTION, EMULSION INTRAVENOUS AS NEEDED
Status: DISCONTINUED | OUTPATIENT
Start: 2023-05-03 | End: 2023-05-03 | Stop reason: SURG

## 2023-05-03 RX ORDER — SUCCINYLCHOLINE CHLORIDE 20 MG/ML
INJECTION INTRAMUSCULAR; INTRAVENOUS AS NEEDED
Status: DISCONTINUED | OUTPATIENT
Start: 2023-05-03 | End: 2023-05-03 | Stop reason: SURG

## 2023-05-03 RX ORDER — MEPERIDINE HYDROCHLORIDE 50 MG/ML
12.5 INJECTION INTRAMUSCULAR; INTRAVENOUS; SUBCUTANEOUS
Status: COMPLETED | OUTPATIENT
Start: 2023-05-03 | End: 2023-05-03

## 2023-05-03 RX ORDER — ACETAMINOPHEN 325 MG/1
650 TABLET ORAL ONCE
Status: DISCONTINUED | OUTPATIENT
Start: 2023-05-03 | End: 2023-05-03 | Stop reason: HOSPADM

## 2023-05-03 RX ORDER — FLUMAZENIL 0.1 MG/ML
0.2 INJECTION INTRAVENOUS AS NEEDED
Status: DISCONTINUED | OUTPATIENT
Start: 2023-05-03 | End: 2023-05-03 | Stop reason: HOSPADM

## 2023-05-03 RX ORDER — DEXAMETHASONE SODIUM PHOSPHATE 4 MG/ML
INJECTION, SOLUTION INTRA-ARTICULAR; INTRALESIONAL; INTRAMUSCULAR; INTRAVENOUS; SOFT TISSUE AS NEEDED
Status: DISCONTINUED | OUTPATIENT
Start: 2023-05-03 | End: 2023-05-03 | Stop reason: SURG

## 2023-05-03 RX ORDER — CEFAZOLIN SODIUM 1 G/3ML
INJECTION, POWDER, FOR SOLUTION INTRAMUSCULAR; INTRAVENOUS AS NEEDED
Status: DISCONTINUED | OUTPATIENT
Start: 2023-05-03 | End: 2023-05-03 | Stop reason: HOSPADM

## 2023-05-03 RX ORDER — PROMETHAZINE HYDROCHLORIDE 25 MG/1
25 SUPPOSITORY RECTAL ONCE AS NEEDED
Status: DISCONTINUED | OUTPATIENT
Start: 2023-05-03 | End: 2023-05-03 | Stop reason: HOSPADM

## 2023-05-03 RX ADMIN — MEPERIDINE HYDROCHLORIDE 12.5 MG: 50 INJECTION INTRAMUSCULAR; INTRAVENOUS; SUBCUTANEOUS at 09:50

## 2023-05-03 RX ADMIN — ONDANSETRON 4 MG: 2 INJECTION INTRAMUSCULAR; INTRAVENOUS at 08:51

## 2023-05-03 RX ADMIN — PHENYLEPHRINE HYDROCHLORIDE 100 MCG: 10 INJECTION, SOLUTION INTRAVENOUS at 08:35

## 2023-05-03 RX ADMIN — LIDOCAINE HYDROCHLORIDE 60 MG: 20 INJECTION, SOLUTION EPIDURAL; INFILTRATION; INTRACAUDAL; PERINEURAL at 07:17

## 2023-05-03 RX ADMIN — MEPERIDINE HYDROCHLORIDE 12.5 MG: 50 INJECTION INTRAMUSCULAR; INTRAVENOUS; SUBCUTANEOUS at 09:44

## 2023-05-03 RX ADMIN — EPHEDRINE SULFATE 10 MG: 50 INJECTION INTRAVENOUS at 08:35

## 2023-05-03 RX ADMIN — HYDROMORPHONE HYDROCHLORIDE 0.5 MG: 1 INJECTION, SOLUTION INTRAMUSCULAR; INTRAVENOUS; SUBCUTANEOUS at 10:10

## 2023-05-03 RX ADMIN — SODIUM CHLORIDE 100 ML/HR: 9 INJECTION, SOLUTION INTRAVENOUS at 06:33

## 2023-05-03 RX ADMIN — PHENYLEPHRINE HYDROCHLORIDE 100 MCG: 10 INJECTION, SOLUTION INTRAVENOUS at 07:25

## 2023-05-03 RX ADMIN — IPRATROPIUM BROMIDE AND ALBUTEROL SULFATE 3 ML: .5; 3 SOLUTION RESPIRATORY (INHALATION) at 06:59

## 2023-05-03 RX ADMIN — EPHEDRINE SULFATE 10 MG: 50 INJECTION INTRAVENOUS at 08:16

## 2023-05-03 RX ADMIN — ROCURONIUM BROMIDE 10 MG: 10 INJECTION INTRAVENOUS at 08:47

## 2023-05-03 RX ADMIN — FENTANYL CITRATE 100 MCG: 50 INJECTION, SOLUTION INTRAMUSCULAR; INTRAVENOUS at 07:17

## 2023-05-03 RX ADMIN — SUCCINYLCHOLINE CHLORIDE 140 MG: 20 INJECTION, SOLUTION INTRAMUSCULAR; INTRAVENOUS at 07:17

## 2023-05-03 RX ADMIN — SUGAMMADEX 200 MG: 100 INJECTION, SOLUTION INTRAVENOUS at 09:22

## 2023-05-03 RX ADMIN — PHENYLEPHRINE HYDROCHLORIDE 100 MCG: 10 INJECTION, SOLUTION INTRAVENOUS at 08:15

## 2023-05-03 RX ADMIN — PROPOFOL 150 MG: 10 INJECTION, EMULSION INTRAVENOUS at 07:17

## 2023-05-03 RX ADMIN — ROCURONIUM BROMIDE 30 MG: 10 INJECTION INTRAVENOUS at 07:25

## 2023-05-03 RX ADMIN — EPHEDRINE SULFATE 5 MG: 50 INJECTION INTRAVENOUS at 08:05

## 2023-05-03 RX ADMIN — ROCURONIUM BROMIDE 20 MG: 10 INJECTION INTRAVENOUS at 08:12

## 2023-05-03 RX ADMIN — FENTANYL CITRATE 50 MCG: 50 INJECTION, SOLUTION INTRAMUSCULAR; INTRAVENOUS at 08:24

## 2023-05-03 RX ADMIN — GLYCOPYRROLATE 0.1 MCG: 0.2 INJECTION, SOLUTION INTRAMUSCULAR; INTRAVITREAL at 08:31

## 2023-05-03 RX ADMIN — PHENYLEPHRINE HYDROCHLORIDE 100 MCG: 10 INJECTION, SOLUTION INTRAVENOUS at 07:28

## 2023-05-03 RX ADMIN — MIDAZOLAM HYDROCHLORIDE 2 MG: 1 INJECTION, SOLUTION INTRAMUSCULAR; INTRAVENOUS at 07:08

## 2023-05-03 RX ADMIN — DEXAMETHASONE SODIUM PHOSPHATE 4 MG: 4 INJECTION, SOLUTION INTRAMUSCULAR; INTRAVENOUS at 08:14

## 2023-05-03 RX ADMIN — PHENYLEPHRINE HYDROCHLORIDE 100 MCG: 10 INJECTION, SOLUTION INTRAVENOUS at 07:23

## 2023-05-03 RX ADMIN — PHENYLEPHRINE HYDROCHLORIDE 100 MCG: 10 INJECTION, SOLUTION INTRAVENOUS at 08:05

## 2023-05-03 RX ADMIN — IPRATROPIUM BROMIDE AND ALBUTEROL SULFATE 3 ML: .5; 3 SOLUTION RESPIRATORY (INHALATION) at 11:07

## 2023-05-03 RX ADMIN — PHENYLEPHRINE HYDROCHLORIDE 200 MCG: 10 INJECTION, SOLUTION INTRAVENOUS at 08:52

## 2023-05-03 RX ADMIN — Medication 2 G: at 07:18

## 2023-05-03 RX ADMIN — EPHEDRINE SULFATE 10 MG: 50 INJECTION INTRAVENOUS at 08:53

## 2023-05-03 NOTE — ANESTHESIA PROCEDURE NOTES
Airway  Urgency: elective    Date/Time: 5/3/2023 7:18 AM  End Time:5/3/2023 7:20 AM  Airway not difficult    General Information and Staff    Patient location during procedure: OR  CRNA/CAA: Tone Mcbride CRNA  SRNA: Deshawn Alonso SRNA  Indications and Patient Condition  Indications for airway management: airway protection    Preoxygenated: yes  MILS maintained throughout  Mask difficulty assessment: 1 - vent by mask    Final Airway Details  Final airway type: endotracheal airway      Successful airway: ETT  Cuffed: yes   Successful intubation technique: video laryngoscopy  Facilitating devices/methods: intubating stylet  Endotracheal tube insertion site: oral  Blade: Lynch  Blade size: 3  ETT size (mm): 8.0  Cormack-Lehane Classification: grade I - full view of glottis  Placement verified by: chest auscultation and capnometry   Measured from: lips  ETT/EBT  to lips (cm): 20  Number of attempts at approach: 1  Assessment: lips, teeth, and gum same as pre-op and atraumatic intubation

## 2023-05-03 NOTE — ANESTHESIA PROCEDURE NOTES
Peripheral IV    Patient location during procedure: OR  Start time: 5/3/2023 7:19 AM  End time: 5/3/2023 7:20 AM  Line placed for Fluids/Medication Admin.  Performed By   CRNA/CAA: Tone Mcbride CRNA  Preanesthetic Checklist  Completed: patient identified, IV checked, site marked, risks and benefits discussed, surgical consent, monitors and equipment checked, pre-op evaluation and timeout performed  Peripheral IV Prep   Patient position: sitting   Prep: ChloraPrep  Patient monitoring: heart rate, cardiac monitor and continuous pulse ox  Peripheral IV Procedure   Laterality:left  Location:  Forearm  Catheter size: 18 G          Post Assessment   Dressing Type: gauze, tape and transparent.    IV Dressing/Site: clean, dry and intact

## 2023-05-03 NOTE — ANESTHESIA PREPROCEDURE EVALUATION
Anesthesia Evaluation     Patient summary reviewed and Nursing notes reviewed   no history of anesthetic complications:  NPO Solid Status: > 8 hours  NPO Liquid Status: > 2 hours           Airway   Mallampati: II  TM distance: >3 FB  Neck ROM: full  No difficulty expected  Dental    (+) edentulous    Pulmonary    (+) a smoker Current, lung cancer, COPD mild, decreased breath sounds,   (-) asthma, shortness of breath, recent URI, rhonchi, wheezes, rales, pulmonary embolism, no home oxygen  Cardiovascular   Exercise tolerance: poor (<4 METS)    ECG reviewed  Rhythm: regular  Rate: normal    (+) hypertension less than 2 medications, valvular problems/murmurs murmur, dysrhythmias PAC, CHF Diastolic >=55%, TAVARES, hyperlipidemia,   (-) past MI, angina, murmur, cardiac stents, CABG, DVT    ROS comment: EKG 5/1/2023:  Normal sinus rhythm  Normal ECG  When compared with ECG of 06-OCT-2022 10:39,  No significant change was found    Echocardiogram from July 2019 with EF of 56 to 60% grade 1 diastolic dysfunction.  No significant valvular disease    Stress test 10/22/2020:  ? Findings consistent with a normal ECG stress test.  ? Left ventricular ejection fraction is normal. (Calculated EF = 70%).  ? Myocardial perfusion imaging indicates a normal myocardial perfusion study with no evidence of ischemia.  ? Impressions are consistent with a low risk study.      Telemetry 12/23/2020:  ? A relatively benign monitor study.  ? Patient had a minimum heart rate of 53 bpm, maximal heart of 136 beats minute and average heart rate of 84 bpm. Predominant underlying rhythm was sinus rhythm  ? PACs were rare less than 1%.  ? No PVCs were detected.  ? Patient had 8 triggered events which were associated sinus rhythm or sinus tachycardia.  ? There was evidence of intermittent sinus exit block mostly at night most likely due to high vagal tone    TTE 7/30/2019:  ? Estimated EF appears to be in the range of 56 - 60%  ? Left ventricular systolic  function is normal.  ? Left ventricular diastolic dysfunction (grade I) consistent with impaired relaxation.      Neuro/Psych  (+) syncope,    (-) seizures, TIA, CVA  GI/Hepatic/Renal/Endo    (+)  GERD well controlled,  diabetes mellitus type 2 well controlled,   (-)  obesity, morbid obesity    Musculoskeletal     (+) neck stiffness,   Abdominal  - normal exam   Substance History      OB/GYN negative ob/gyn ROS         Other   arthritis,    history of cancer    ROS/Med Hx Other: CTA chest 4/11/2023:    HEART:  Unremarkable.  No cardiomegaly.  No significant pericardial effusion.  No evidence of RV dysfunction.     MEDIASTINUM:  Right perihilar mass noted measuring approximately 5.1 x 4.5 x 4.7 cm surrounding the pulmonary artery and pulmonary veins. The mass extends extends along the right bronchus with associated bronchial wall thickening and extension of the   mass into the subcarinal region and the anterior right paratracheal region which may represent associated adenopathy.  Stable calcified subcarinal lymph nodes noted.     PET 4/17/2023:  IMPRESSION:  1.  Intensely FDG-avid right hilar and infrahilar mass, which extends into the  mediastinum.  This is compatible with primary lung neoplasm.     2.  Hypermetabolic focus within the gastrohepatic ligament.     3.  Single hepatic focus within the right hepatic lobe near the falciform  ligament.     4.  Single osseous hypermetabolic focus within the right iliac bones concerning  for metastatic osseous disease.    Rarely uses albuterol inhaler per pt. Less than once a month.     Hx of Syncope: worked up by Dr. Paredes. Echocardiogram from July 2019 with EF of 56 to 60% grade 1 diastolic dysfunction.  No significant valvular disease. Heart monitor showed intermittent sinus exit block mostly at night due to high vagal tone.  However this will not cause her syncope.  Currently her symptoms have resolved.  If patient were to have recurrent syncopal episode will consider  loop recorder versus referral to EP for an EP study and consideration for pacemaker.  Avoid any AV comfort blocking agents    Protrusion of cervical intervertebral disc Broad based intervertebral disc protrusion - C5-6, minimally impressing upon ventral thecal sac by MRI done 4/7/15      Hx of thrombocytosis: Platelets 1,012     Hx of splenic rupture 3/6/2023 in [place]- Was transfused 7 units of blood at that time per pt. She has been anemic ever since.     Hx of DM: pt on ozempic. Last [id number]=3.5                Anesthesia Plan    ASA 3     general and Jacqueline   Rapid sequence  (Hgb=8.1, T&S  Allergic to morphine (N/V)  Rapid sequence due to ozempic prescription  Discussed arterial line and another IV with patient and she understands possible complications, risks, & agrees.  Duoneb in preop)  intravenous induction     Anesthetic plan, risks, benefits, and alternatives have been provided, discussed and informed consent has been obtained with: patient and child.  Pre-procedure education provided  Use of blood products discussed with patient and child  Consented to blood products.   Plan discussed with CRNA.        CODE STATUS:

## 2023-05-03 NOTE — OP NOTE
OPERATIVE NOTE  Isabel Villalba  1965  5/3/2023    PREOP DIAGNOSES:  Lung mass [R91.8]    POSTOP DIAGNOSES:  Lung mass [R91.8]    PROCEDURE:   MEDIASTINOSCOPY  BRONCHOSCOPY     Endobronchial biopsy RIGHT upper lobe  Protected bronchial brushings  Bronchial washings                    PORT PLACEMENT  (RSCV)  Fluoroscopic guidance    SURGEON: SERENA Porter MD FACS [facility]    ASSISTANT: Chas Kaufman CFA    ANESTHESIA: General ET     ESTIMATED BLOOD LOSS: minimal     SPECIMEN:  bronchus, bronchial washing, bronchial brushing and lymph node    COMPLICATIONS: None    DESCRIPTION OF OPERATION: Patient was taken to operating room and placed in supine position.  General anesthesia induced. Fiberoptic bronchoscopy performed demonstrating mild erythema of mucosa. Right  lower middle lobe bronchi were clear. Upper lobe bronchus narrowed, no obvious tumor noted.  Endobronchial biopsies, protected brushings, bronchial washings performed RIGHT upper lobe. Left upper and lower lobe bronchi were clear.  Moderate thin secretions.  Prepped and draped in sterile fashion.      2.5.cm incision was made just above the sternal notch. Dissection carried down to the anterior tracheal fascia.  Blunt finger dissection down the anterior surface of the trachea.  Mediastinoscope was introduced identifying mediastinal adenopathy in the level 4R position.  Multiple biopsies taken, sent for frozen section.  Frozen section returned as positive for poorly differentiated carcinoma.  Hemostasis was obtained. Incision closed in layers of 3-0 Vicryl, the platysma with 4-0 Monocryl and the skin with Dermabond.     2.5 cm incision was then made in the left infraclavicular space.  Dissection down to the anteropectoral fascia. Inferior pocket was made.  Patient placed in deep Trendelenburg. Subclavian vein was cannulated via modified Seldinger technique with Mini-Stick, wire position was confirmed by fluoroscopy, exchanged for dilator and  peel-away sheath.  Catheter was fashioned, passed over the wire into the right atrium, wire was removed, catheter was pulled back to the upper portion right atrium/SVC junction.  Proximal catheter was cut to length, attached to the port and secured. Aspirated and flushed without difficulty, and placed in the pocket. 1000 units/mL heparin was instilled. Port was secured to the anteropectoral fascia using 3-0 Prolene suture. Hemostasis was obtained. Incision closed in layers of 3-0 Vicryl, the subcutaneous tissue with 4-0 Monocryl and the skin with Dermabond. Tolerated procedure well, transferred PACU in stable condition.          This document has been electronically signed by Jerman Porter MD on May 3, 2023 09:20 CDT

## 2023-05-03 NOTE — ANESTHESIA POSTPROCEDURE EVALUATION
"Patient: Isabel Villalba    Procedure Summary     Date: 05/03/23 Room / Location: [facility] OR 05 / [facility] OR    Anesthesia Start: 0708 Anesthesia Stop: 0943    Procedures:       MEDIASTINOSCOPY, BRONCHOSCOPY                      (DONE IN OR WITH ENDO) (Neck)      POSSIBLE PORT PLACEMENT       (C-ARM#2) Diagnosis:       Lung mass      (Lung mass [R91.8])    Surgeons: Jerman Porter MD Provider: Tone Mcbride CRNA    Anesthesia Type: generalJacqueline ASA Status: 3          Anesthesia Type: general, Jacqueline    Vitals  Vitals Value Taken Time   /61 05/03/23 0944   Temp 97.6 °F (36.4 °C) 05/03/23 0944   Pulse 89 05/03/23 0944   Resp 16 05/03/23 0944   SpO2 99 % 05/03/23 0944           Post Anesthesia Care and Evaluation    Patient location during evaluation: PACU  Patient participation: complete - patient participated  Level of consciousness: awake  Pain score: 0  Pain management: adequate    Airway patency: patent  Anesthetic complications: No anesthetic complications  PONV Status: none  Cardiovascular status: acceptable  Respiratory status: acceptable, spontaneous ventilation and face mask  Hydration status: acceptable    Comments: /61 (BP Location: Left arm)   Pulse 89   Temp 97.6 °F (36.4 °C) (Temporal)   Resp 16   Ht 152.4 cm (60\")   Wt 51.3 kg (113 lb 1.5 oz)   SpO2 99%   BMI 22.09 kg/m²         "

## 2023-05-03 NOTE — INTERVAL H&P NOTE
H&P reviewed. The patient was examined and there are no changes to the H&P.    Detail discussion with Isabel Villalba regarding his situation options and plans. Significant mediastinal lymphadenopathy requiring tissue diagnosis. Bronchoscopy, Mediastinoscopy and biopsy are advisable, possible port placement if diagnose malignancy.    Risks including, but not limited to, bleeding, transfusion, infection, pulmonary dysfunction, blood vessel or nerve injury, possible open procedure to repair bleeding or lung injury.  Benefits: Diagnosis leading to possible treatment.  Options: observation, endoscopic ultrasound with biopsy discussed.    Bronchoscopy, mediastinoscopy, possible port placement.  GEN.  SDS.  5/3/2023

## 2023-05-05 LAB — REF LAB TEST METHOD: NORMAL

## 2023-05-07 LAB
BH BB BLOOD EXPIRATION DATE: NORMAL
BH BB BLOOD EXPIRATION DATE: NORMAL
BH BB BLOOD TYPE BARCODE: NORMAL
BH BB BLOOD TYPE BARCODE: NORMAL
BH BB DISPENSE STATUS: NORMAL
BH BB DISPENSE STATUS: NORMAL
BH BB PRODUCT CODE: NORMAL
BH BB PRODUCT CODE: NORMAL
BH BB UNIT NUMBER: NORMAL
BH BB UNIT NUMBER: NORMAL
CROSSMATCH INTERPRETATION: NORMAL
CROSSMATCH INTERPRETATION: NORMAL
UNIT  ABO: NORMAL
UNIT  ABO: NORMAL
UNIT  RH: NORMAL
UNIT  RH: NORMAL

## 2023-05-09 ENCOUNTER — OFFICE VISIT (OUTPATIENT)
Dept: ONCOLOGY | Facility: CLINIC | Age: 58
End: 2023-05-09
Payer: MEDICARE

## 2023-05-09 ENCOUNTER — DOCUMENTATION (OUTPATIENT)
Dept: ONCOLOGY | Facility: CLINIC | Age: 58
End: 2023-05-09
Payer: MEDICARE

## 2023-05-09 VITALS
SYSTOLIC BLOOD PRESSURE: 139 MMHG | DIASTOLIC BLOOD PRESSURE: 70 MMHG | RESPIRATION RATE: 18 BRPM | TEMPERATURE: 98.7 F | HEART RATE: 89 BPM | BODY MASS INDEX: 22.6 KG/M2 | OXYGEN SATURATION: 94 % | WEIGHT: 115.7 LBS

## 2023-05-09 DIAGNOSIS — C79.51 METASTASIS TO BONE: ICD-10-CM

## 2023-05-09 DIAGNOSIS — C34.91 NON-SMALL CELL CANCER OF RIGHT LUNG: Primary | ICD-10-CM

## 2023-05-09 PROBLEM — T45.4X5A ADVERSE EFFECT OF IRON: Status: ACTIVE | Noted: 2023-05-09

## 2023-05-09 PROBLEM — D50.9 IRON DEFICIENCY ANEMIA: Status: ACTIVE | Noted: 2023-05-09

## 2023-05-09 LAB — REF LAB TEST METHOD: NORMAL

## 2023-05-09 RX ORDER — DIPHENHYDRAMINE HCL 25 MG
25 CAPSULE ORAL ONCE
OUTPATIENT
Start: 2023-05-16

## 2023-05-09 RX ORDER — FAMOTIDINE 20 MG/1
20 TABLET, FILM COATED ORAL ONCE
OUTPATIENT
Start: 2023-05-16

## 2023-05-09 RX ORDER — ACETAMINOPHEN 325 MG/1
650 TABLET ORAL ONCE
OUTPATIENT
Start: 2023-05-16

## 2023-05-09 RX ORDER — FAMOTIDINE 10 MG/ML
20 INJECTION, SOLUTION INTRAVENOUS AS NEEDED
OUTPATIENT
Start: 2023-05-16

## 2023-05-09 RX ORDER — DIPHENHYDRAMINE HYDROCHLORIDE 50 MG/ML
50 INJECTION INTRAMUSCULAR; INTRAVENOUS AS NEEDED
OUTPATIENT
Start: 2023-05-16

## 2023-05-09 RX ORDER — SODIUM CHLORIDE 9 MG/ML
250 INJECTION, SOLUTION INTRAVENOUS ONCE
OUTPATIENT
Start: 2023-05-16

## 2023-05-09 NOTE — LETTER
May 9, 2023      To Whom It May Concern:    Mook Morin was present in the [facility] on 5/9/2023.     Thank you for choosing [facility]

## 2023-05-09 NOTE — PROGRESS NOTES
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  Since last clinic visit patient had a PET/CT done as well as mediastinoscopy and bronchoscopy with biopsy by Dr. Porter.  Patient is here to discuss results and further recommendation.  Patient had 40 pound of weight loss in last 1 year out of which 25 pound of weight loss happened prior to splenic rupture.  Complains of fatigue.  States she has not been able to tolerate iron by mouth due to stomach upset.  Complains of lower extremity pain.  Denies any new lymph node enlargement.  Complains of intermittent tingling and numbness affecting bilateral hands.          Past Medical History, Past Surgical History, Social History, Family History have been reviewed and are without significant changes except as mentioned.    Review of Systems   A comprehensive 14 point review of systems was performed and was negative except as mentioned in HPI.    Medications:  The current medication list was reviewed in the EMR    ALLERGIES:    Allergies   Allergen Reactions   • Morphine And Related Nausea And Vomiting       Objective      Vitals:    05/09/23 1259   BP: 139/70   Pulse: 89   Resp: 18   Temp:  98.7 °F (37.1 °C)   SpO2: 94%   Weight: 52.5 kg (115 lb 11.2 oz)   PainSc:   8   PainLoc: Back          View : No data to display.                Physical Exam  Neurological:      Mental Status: She is alert and oriented to person, place, and time.           RECENT LABS:  Glucose   Date Value Ref Range Status   05/01/2023 152 (H) 65 - 99 mg/dL Final     Sodium   Date Value Ref Range Status   05/01/2023 137 136 - 145 mmol/L Final   04/11/2023 136 136 - 145 mmol/L Final     Potassium   Date Value Ref Range Status   05/01/2023 3.6 3.5 - 5.2 mmol/L Final   04/11/2023 3.1 (L) 3.5 - 5.1 mmol/L Final     CO2   Date Value Ref Range Status   05/01/2023 27.0 22.0 - 29.0 mmol/L Final     Chloride   Date Value Ref Range Status   05/01/2023 98 98 - 107 mmol/L Final   04/11/2023 98 98 - 107 mmol/L Final     Anion Gap   Date Value Ref Range Status   05/01/2023 12.0 5.0 - 15.0 mmol/L Final     Creatinine   Date Value Ref Range Status   05/01/2023 0.59 0.57 - 1.00 mg/dL Final   04/11/2023 0.7 0.6 - 1.0 mg/dL Final     BUN   Date Value Ref Range Status   05/01/2023 10 6 - 20 mg/dL Final   04/11/2023 6 (L) 7 - 18 mg/dL Final     BUN/Creatinine Ratio   Date Value Ref Range Status   05/01/2023 16.9 7.0 - 25.0 Final     Calcium   Date Value Ref Range Status   05/01/2023 10.0 8.6 - 10.5 mg/dL Final   04/11/2023 9.2 8.5 - 10.1 mg/dL Final     eGFR Non  Amer   Date Value Ref Range Status   07/01/2021 80 51 - 120 mL/min/1.73 Final     Alkaline Phosphatase   Date Value Ref Range Status   04/11/2023 97 46 - 116 U/L Final     Total Protein   Date Value Ref Range Status   04/11/2023 7.6 6.4 - 8.2 g/dL Final     ALT (SGPT)   Date Value Ref Range Status   04/11/2023 11 (L) 14 - 59 U/L Final     AST (SGOT)   Date Value Ref Range Status   04/11/2023 10 (L) 15 - 37 U/L Final     Total Bilirubin   Date Value Ref Range Status   04/11/2023 0.20 0.20 - 1.00 mg/dL Final     Albumin   Date Value Ref Range Status   04/11/2023 1.9 (L) 3.4 - 5.0 g/dL  Final     Globulin   Date Value Ref Range Status   2023 4.3 (H) 2.3 - 3.5 gm/dL Final     Lab Results   Component Value Date    WBC 15.66 (H) 2023    HGB 8.2 (L) 2023    HCT 26.8 (L) 2023    MCV 82.0 2023    PLT 1,012 (C) 2023     Lab Results   Component Value Date    NEUTROABS 9.59 (H) 2023    IRON 9 (L) 2023    TIBC 194 (L) 2023    LABIRON 5 (L) 2023    FERRITIN 386.30 (H) 2023    SQBEHTWW98 327 2023    CMPDGXFR86 210 (L) 2022    EFSPNGFX92 242 2021    FOLATE >20.00 2023    FOLATE 5.27 2022    FOLATE 5.33 2021     Lab Results   Component Value Date    REFLABREPO  2023     [facility]  [address] [place] [address]  Phone: [phone] or [phone]  Fax: [phone]  Daniel Rosenbaum M.D., Medical Director    PATIENT NAME                           LABORATORY NO.  LAKISHA GIANG.                 [id number]  [id number]                         AGE              SEX  SSN           CLIENT REF #  [facility]           57      1965  F    [id number]   [id number]    [place]                       REQUESTING M.D.     [place] [address]             DATE COLLECTED      DATE RECEIVED      DATE REPORTED  2023    DIAGNOSIS:  A.   LUNG, BIOPSY, RIGHT UPPER LOBE:  Benign bronchial mucosa  B.   LUNG, BIOPSY, RIGHT UPPER LOBE:  Benign bronchial mucosa  C.   LYMPH NODE, 4R:  Poorly differentiated carcinoma, non-small cell, metastatic  D.   LYMPH NODE, 4R:  Poorly differentiated carcinoma, non-small cell, metastatic  E.   LYMPH NODE, 2R:  One benign lymph node, negative for metastatic malignancy    JBS/sm    COMMENT:  I ordered a panel of immunohistochemical (IHC) stains on block  D1 to assist with the  "differential diagnosis between squamous cell carcinoma,  adenocarcinoma, neuroendocrine carcinoma, and malignant melanoma.  Tumor  cells are strongly and diffusely positive for cytokeratin cocktail (AE1/AE3) IHC  stain, and are negative on IHC staining for napsin A, TTF-1, chromogranin,  synaptophysin, p40, and SOX-10.  All control tissue stains as expected.    Dr. Shayy Barrios has reviewed the H&E morphology and agrees with the  diagnoses.  The findings were discussed with Dr. Porter and Dr. Duckworth in the  morning on May 09, 2023.    CLINICAL HISTORY:  Lung mass    SPECIMENS RECEIVED:  A.  LUNG, BIOPSY, RIGHT UPPER LOBE  B.  LUNG, BIOPSY, RIGHT UPPER LOBE  C.  LYMPH NODE, 4R  D.  LYMPH NODE, 4R  E.  LYMPH NODE, 2R    FROZEN SECTION INTERPRETATION:  Frozen section performed by Dr. Rust at [facility] (refer to above for address).  Frozen  section diagnosis C:  Malignant.  Frozen section stains are acceptable.  The  patient ID is verified and a verbal report was given to Dr. Porter by Dr. Rust. Total time 17 minutes.    MICROSCOPIC DESCRIPTION:  Tissue blocks are prepared and slides are examined microscopically on all  specimens. See diagnosis for details.    The internal and external (both positive and negative) controls reacted  appropriately. Some of our immunohistochemical and in situ hybridization  studies are performed as analyte specific reagents. The following statement  applies to those tests: This test was developed, and its performance  characteristics determined by [facility]. It has not been  cleared or approved by the US Food and Drug Administration. However, the  FDA has determined that approval and clearance are not necessary.    Professional interpretation rendered by Broderick Rust M.D. at [email or web address],  [facility], [address] [place] [address].    GROSS DESCRIPTION:  A.  Specimen received in formalin labeled \"lung, right upper lobe biopsy\" " "and  consists of a scant amount of soft tissue measuring 0.1 x 0.1 x 0.1 cm in  aggregate.  Specimen is filtered and submitted in its entirety in a single  cassette.  B.  Specimen received in formalin labeled \"lung, right upper lobe biopsy\" and  consists of multiple minute portions of tan soft tissue measuring 0.2 x 0.1 x  0.1 cm in aggregate.  Specimen is filtered and submitted in its entirety in 2  cassettes.  C.  Specimen received in formalin labeled \"lymph node 4 right frozen section\"  and consists of a 0.5 x 0.5 x 0.4 cm lymph node.  Specimen is entirely  frozen and resubmitted in cassette designated C1.  D.  Specimen received in formalin labeled \"lymph node 4 right\" and consists  of multiple fragmented portions of tan-yellow soft nodular tissue  measuring 2.0 x 1.5 x 0.7 cm in aggregate.  Specimen is filtered and  submitted in its entirety in 3 cassettes.  E.  Specimen received in formalin labeled \"lymph node 2 right\" and consists  of a 0.4 x 0.3 x 0.2 cm portion of tan soft tissue.  Specimen is submitted in  its entirety in a single cassette as received.  JTM    REVIEWED, DIAGNOSED AND ELECTRONICALLY  SIGNED BY:    Broderick Rust M.D.  CPT CODES:  88305x5, 18783, 88341x6, 47460      REFLABREPO  2023     [facility]  [address] [place] [address]  Phone: [phone] or [phone]  Fax: [phone]  Daniel Rosenbaum M.D., Medical Director    PATIENT NAME                                 LABORATORY NO.  1800   LAKISHA EDWARDS.                       [id number]  [id number]                                     AGE                SEX     SSN            CLIENT REF #  [facility]                       57       1965   F       [id number]    [id number]  [place]                                   REQUESTING M.D.       ATTEND[place] [address]           "               DATE COLLECTED        DATE RECEIVED          DATE REPORTED  05/03/2023 05/03/2023 05/05/2023    DIAGNOSIS:  A.      BRONCH WASH:  Negative for malignant cells.  B.      BRONCH BRUSH:  Negative for malignant cells.    MICROSCOPIC DESCRIPTION:  A.     Pulmonary macrophages and acute inflammatory cells present  B.     Reactive bronchial cells are present    Professional interpretation rendered by Shayy Barrios D.O. at [facility], [facility], [address] [place] [address].    COMMENT:  Please correlate with concurrent lung biopsy ([id number]; pending at time of  signout of this report).    CLINICAL HISTORY:  Lung mass    SPECIMENS SUBMITTED:  A.    BRONCH WASH  B.    BRONCH BRUSH    GROSS SPECIMEN DESCRIPTION:  A.     40cc of opaque red fluid in fixative  Cell block has been examined.  B.     4 premade alcohol smears    CYTOTECHNOLOGIST:         MICKEY CT (ASCP)                       REVIEWED, DIAGNOSED AND ELECTRONICALLY  SIGNED BY:    Shayy Barrios D.O.  CPT CODES:  88112x2, 73439           PATHOLOGY:  * Cannot find OR log *         RADIOLOGY DATA :  XR Chest Post CVA Port    Result Date: 5/3/2023  An AP view of the chest shows a left subclavian Port-A-Cath with its tip in good position in the superior vena cava.  There is no evidence of pneumothorax. A large right perihilar soft tissue mass is seen.  There appears to be a component of mild vascular congestion as well. No other significant finding.        PET/CT done on April 24, 2023 showed:    FINDINGS:  No worrisome uptake within the head or neck.  There is a large right hilar and  infrahilar mass which extends into the mediastinum.  This has a maximum SUV of  21.2.     There is a moderately FDG-avid nodule within the region of the gastrohepatic  ligament with a maximal SUV of 6.8.     There is what appears to be a hepatic lesion within the right hepatic lobe near  the left hepatic lobe junction.  This has a maximal SUV  "of 4.7.  There is a  focal area of uptake within the right iliac bone, which is concerning for  metastatic disease.  This has a maximal SUV of 10.     IMPRESSION:  1.  Intensely FDG-avid right hilar and infrahilar mass, which extends into the  mediastinum.  This is compatible with primary lung neoplasm.     2.  Hypermetabolic focus within the gastrohepatic ligament.     3.  Single hepatic focus within the right hepatic lobe near the falciform  ligament.     4.  Single osseous hypermetabolic focus within the right iliac bones concerning  for metastatic osseous disease.                 Assessment & Plan     1.  Poorly differentiated non-small cell cancer of right lung with liver lesion and bone lesion worrisome for stage IV metastatic disease  - Result of PET/CT, staging and pathology report were discussed with patient today.  - Pathology report was also discussed with Dr. Rust earlier in the day.  - Patient has liver lesion on right lobe of liver with increased SUV as well as increased uptake in falciform ligament with nodular lesion as well as right iliac bone lesion with SUV around 10 worrisome for metastatic disease and stage IV.  - Right lung mass with hilar extension is also showing increased uptake on PET/CT consistent with malignancy as documented on biopsy.  - Discussed with patient and family due to extensive nature of cancer with metastatic involvement of liver\" treatment option would be palliative rather than curative at this point.  - Treatment option consisting of hospice versus palliative chemotherapy/immunotherapy were discussed with patient and family  - Patient is not sure at this point if she wants to do any treatment or not.  - Chemotherapy side effects of carboplatin and Taxol including but not limited to risk of low blood counts, risk of infection, need for blood transfusion, risk of bleeding, risk of kidney failure, diarrhea, nausea, vomiting, risk of neuropathy and risk of allergic reaction " which can be life-threatening were discussed with patient and family. We will also provide them some information today to read about the chemotherapy.  - Side effect of immunotherapy of Keytruda including but not limited to immune mediated pneumonitis, hepatitis, colitis, thyroiditis, encephalitis, nephritis, pan hypopituitarism, dermatitis and skin rash were discussed with the patient.  We will provide them information about immunotherapy today.  It was also discussed with them rarely immune mediated side effect can be severe and life-threatening.  We will provide information to read about Keytruda today.  - Patient will notify our office regarding her decision regarding treatment option.  - Further follow-up appointment will be provided depending on patient's decision.  -Also recommend getting MRI of brain with contrast to rule out any brain metastasis prior to starting of any systemic treatment.  -Omniseq advance has been ordered on tumor for further tumor testing.  - We will recheck CBC and CMP upon next clinic visit.    2.  Anemia:  - Secondary to splenectomy and blood loss from splenic rupture.  - Patient is currently on B12 and iron tablet p.o. daily.  - Patient states she has not been able to tolerate iron tablet by mouth due to severe stomach upset.  - Recommend starting intravenous Venofer starting next week.  Side effect of Venofer including allergic reaction were discussed with patient.  - Recommend continuing with B12 tablet p.o. daily.  - We will repeat anemia work-up around July 2023    3.  Leukocytosis and thrombocytosis:  - Secondary to splenectomy status plus reactive due to malignancy  - We will monitor with CBC    4.  Hypertension    5.  Diabetes mellitus    6.  Health maintenance: Patient has been counseled about smoking cessation.        About 40 minutes were spent for this clinic visit today including discussing case with consultant, discussing result of PET/CT, pathology report, treatment  option, treatment side effect as well as answering their questions.             PHQ-9 Total Score: 0   -Patient is not homicidal or suicidal.  No acute intervention required.    Isabel Villalba reports a pain score of 8.  Given her pain assessment as noted, treatment options were discussed and the following options were decided upon as a follow-up plan to address the patient's pain: continuation of current treatment plan for pain.         Brett Duckworth MD  5/9/2023  16:59 CDT        Part of this note may be an electronic transcription/translation of spoken language to printed text using the Dragon Dictation System.          CC:

## 2023-05-11 ENCOUNTER — DOCUMENTATION (OUTPATIENT)
Dept: NUTRITION | Facility: HOSPITAL | Age: 58
End: 2023-05-11
Payer: MEDICARE

## 2023-05-11 NOTE — PROGRESS NOTES
"Adult Outpatient Nutrition  Assessment    Patient Name:  Isabel Villalba  YOB: 1965  MRN: [id number]    Assessment Date:  Entry from 5/9/2023    CHART REVIEW  Isabel Villalba  1965  57 y.o.  Wt: 115.9 lb  Ht: 60 in  Dx: lung cancer  Tx: chemo offered (pt undecided)  Labs: glucose 173    PATIENT/FAMILY COMMENTS (pt & daughter)  Usual Body Weight: 150-160 lb/4 mon ago  Weight Comments: fast trend down  Diet: diabetic  Food Allergies: nkfa  Appetite/Intake: decreased (best later in day)  Supplements: poor acceptance of commercial--willing to try homemade options  Meals: 2-3  Food Preparation: Pt +   Nutrition Concerns:  * lost 28% body wt/4 mon  * mouth sensitive (improving)  * N/V once (med effective)  * infrequent glucose home monitoring--has experienced \"shakes\"  No problems with chewing/swallowing/constipation/diarrhea.    GOAL(s)  Optimize nutrition in attempt to prevent further loss of LBM      EDUCATION  -High calorie high protein soft consis carbohydrate diet (small frequent feedings)  -Snacks  -Supplementation        * Provided recipes for homemade supplements.  -Importance of staying hydrated  -Importance of glucose monitoring--hypoglycemia risk  -Oral care  -Food safety    To reinforce education, written information with RD's name & number provided.  Pt and family very receptive to suggestions, and agreed to call on dietitian as needed.                      Electronically signed by:  Venessa Desai RD  05/11/23 09:02 CDT   "

## 2023-05-16 ENCOUNTER — OFFICE VISIT (OUTPATIENT)
Dept: CARDIAC SURGERY | Facility: CLINIC | Age: 58
End: 2023-05-16
Payer: MEDICARE

## 2023-05-16 ENCOUNTER — PATIENT OUTREACH (OUTPATIENT)
Dept: ONCOLOGY | Facility: HOSPITAL | Age: 58
End: 2023-05-16
Payer: MEDICARE

## 2023-05-16 VITALS
SYSTOLIC BLOOD PRESSURE: 110 MMHG | WEIGHT: 115 LBS | HEART RATE: 98 BPM | HEIGHT: 60 IN | OXYGEN SATURATION: 96 % | BODY MASS INDEX: 22.58 KG/M2 | TEMPERATURE: 97.7 F | DIASTOLIC BLOOD PRESSURE: 70 MMHG

## 2023-05-16 DIAGNOSIS — Z79.899 OTHER LONG TERM (CURRENT) DRUG THERAPY: ICD-10-CM

## 2023-05-16 DIAGNOSIS — C34.91 NON-SMALL CELL CANCER OF RIGHT LUNG: ICD-10-CM

## 2023-05-16 DIAGNOSIS — C34.91 NON-SMALL CELL CANCER OF RIGHT LUNG: Primary | ICD-10-CM

## 2023-05-16 DIAGNOSIS — Z48.813 SURGICAL AFTERCARE, RESPIRATORY SYSTEM: ICD-10-CM

## 2023-05-16 DIAGNOSIS — C79.51 METASTASIS TO BONE: Primary | ICD-10-CM

## 2023-05-16 PROCEDURE — 3074F SYST BP LT 130 MM HG: CPT | Performed by: NURSE PRACTITIONER

## 2023-05-16 PROCEDURE — 1159F MED LIST DOCD IN RCRD: CPT | Performed by: NURSE PRACTITIONER

## 2023-05-16 PROCEDURE — 1160F RVW MEDS BY RX/DR IN RCRD: CPT | Performed by: NURSE PRACTITIONER

## 2023-05-16 PROCEDURE — 3078F DIAST BP <80 MM HG: CPT | Performed by: NURSE PRACTITIONER

## 2023-05-16 PROCEDURE — 99024 POSTOP FOLLOW-UP VISIT: CPT | Performed by: NURSE PRACTITIONER

## 2023-05-16 RX ORDER — OLANZAPINE 5 MG/1
5 TABLET ORAL NIGHTLY
Qty: 3 TABLET | Refills: 3 | Status: SHIPPED | OUTPATIENT
Start: 2023-05-16

## 2023-05-16 RX ORDER — ONDANSETRON HYDROCHLORIDE 8 MG/1
8 TABLET, FILM COATED ORAL 3 TIMES DAILY PRN
Qty: 30 TABLET | Refills: 5 | Status: SHIPPED | OUTPATIENT
Start: 2023-05-16

## 2023-05-16 NOTE — SIGNIFICANT NOTE
Met with pt at follow up appt with Octavio GARCÍA to discuss my role as the nurse navigator in cancer care and my support services to pt and family.  Stressed the importance of education and informed decision making and my assistance as needed to pt and family. Provided my contact information and encouraged pt to reach out anytime to me with any concerns or questions. Pt stated understanding of discussion and denied any further concerns or questions today. Will f/u with pt as needed throughout cancer treatment course.

## 2023-05-16 NOTE — PROGRESS NOTES
CVTS Office Progress Note     5/16/2023    Isabel Villalba  1965    Chief Complaint:    Chief Complaint   Patient presents with   • Wound Check       HPI:      PCP:  Radha Simental MD  Oncology: Dr. Duckworth    57 y.o. female with HTN(stable, increased risk stroke, rupture), Hyperlipidemia(stable, increased risk cardiovascular events), Diabetes Mellitus(stable, increased risk cardiovascular events) and Smoker(uncontrolled, increased risk cardiovascular events) lung cancer, iron deficiency, vitamin D deficiency.  smokes 1 PPD.  Referred to thoracic surgery for right lung mass, underwent mediastinoscopy bronchoscopy for tissue sampling positive for poorly differentiated non-small cell carcinoma.  Currently mulling options with oncology, has port.  Returns today in postoperative follow-up for wound check. no other associated signs, symptoms or modifying factors.    5/3/23 mediastinoscopy, bronchoscopy, port placement    The following portions of the patient's history were reviewed and updated as appropriate: allergies, current medications, past family history, past medical history, past social history, past surgical history and problem list.  Recent images independently reviewed.  Available laboratory values reviewed.    PMH:  Past Medical History:   Diagnosis Date   • Acute upper respiratory infection    • Diabetes mellitus    • Disorder of bile duct     biliary ductal ectasia noted on MRI   • Epigastric pain    • Essential hypertension    • Fatigue    • Gastritis    • Gastroesophageal reflux disease    • Gouty arthropathy     left first MTP joint   • H/O screening mammography 06/24/2014   • Hallux limitus of right foot    • Hernia of abdominal wall    • History of transfusion    • Hyperlipidemia    • Irreducible incisional hernia     vs lipoma   • Lumbago with sciatica    • Lumbago-sciatica due to displacement of lumbar intervertebral disc    • Protrusion of cervical intervertebral disc     Broad based  intervertebral disc protrusion - C5-6, minimally impressing upon ventral thecal sac by MRI done 4/7/15    • Pruritic rash    • Systolic murmur    • Vitamin B12 deficiency    • Weight increase      Past Surgical History:   Procedure Laterality Date   • ABDOMINAL SURGERY     • BREAST BIOPSY Left    • CARPAL TUNNEL RELEASE Bilateral 1995   • CHOLECYSTECTOMY  2004   • ENDOSCOPY     • INJECTION OF MEDICATION  11/17/2015    Bicillin LA 1.2   • INJECTION OF MEDICATION  11/17/2015    depo medrol   • SPLENECTOMY  03/07/2023   • TUBAL ABDOMINAL LIGATION  2004     Family History   Problem Relation Age of Onset   • ADD / ADHD Other    • Bipolar disorder Other    • Cancer Other    • Coronary artery disease Other    • Diabetes Other    • Heart disease Other    • Hyperlipidemia Other    • Hypertension Other    • Lung cancer Other    • Migraines Other    • Polycystic ovary syndrome Other    • Schizophrenia Other    • Stroke Other    • Tuberculosis Other    • Other Other         respiratory disorder; smoking tobacco   • Breast cancer Paternal Grandmother    • Hypertension Mother    • Cancer Father    • Breast cancer Father      Social History     Tobacco Use   • Smoking status: Every Day     Packs/day: 0.50     Types: Cigarettes     Passive exposure: Current   • Smokeless tobacco: Never   Vaping Use   • Vaping Use: Never used   Substance Use Topics   • Alcohol use: No   • Drug use: No       ALLERGIES:  Allergies   Allergen Reactions   • Morphine And Related Nausea And Vomiting         MEDICATIONS:    Current Outpatient Medications:   •  albuterol sulfate HFA (Ventolin HFA) 108 (90 Base) MCG/ACT inhaler, Inhale 2 puffs Every 6 (Six) Hours As Needed for Wheezing or Shortness of Air., Disp: 18 g, Rfl: 5  •  amLODIPine-benazepril (LOTREL 5-20) 5-20 MG per capsule, Take 1 capsule by mouth Daily., Disp: 90 capsule, Rfl: 3  •  atomoxetine (STRATTERA) 25 MG capsule, TAKE 1 CAPSULE BY MOUTH DAILY., Disp: 90 capsule, Rfl: 5  •  benzonatate  (TESSALON) 200 MG capsule, Take 1 capsule by mouth 3 (Three) Times a Day As Needed for Cough., Disp: 30 capsule, Rfl: 2  •  denosumab (Prolia) 60 MG/ML solution prefilled syringe syringe, Inject 1 mL under the skin into the appropriate area as directed Every 6 (Six) Months., Disp: 1 mL, Rfl: 1  •  docusate sodium (COLACE) 100 MG capsule, Take 1 capsule by mouth Every 12 (Twelve) Hours., Disp: 60 capsule, Rfl: 0  •  ferrous gluconate (FERGON) 324 MG tablet, Take 1 tablet by mouth Daily With Breakfast., Disp: 30 tablet, Rfl: 2  •  fluticasone (FLONASE) 50 MCG/ACT nasal spray, 2 sprays by Each Nare route Daily., Disp: 16 g, Rfl: 5  •  glucose blood test strip, Patient is testing 2xs daily, Disp: 100 each, Rfl: 12  •  glucose monitor monitoring kit, 1 each 2 (two) times a day. Patient is testing 2 xs daily, Disp: 1 each, Rfl: 0  •  HYDROcodone-acetaminophen (NORCO)  MG per tablet, Take 1 tablet by mouth 4 (Four) Times a Day As Needed for Moderate Pain., Disp: 120 tablet, Rfl: 0  •  Lancets Misc. misc, Patient is testing 2 xs daily, Disp: 100 each, Rfl: 11  •  meloxicam (MOBIC) 15 MG tablet, Take 1 tablet by mouth Daily. (Patient taking differently: Take 1 tablet by mouth Daily As Needed.), Disp: 30 tablet, Rfl: 5  •  Ozempic, 0.25 or 0.5 MG/DOSE, 2 MG/1.5ML solution pen-injector, INJECT 0.5 MG UNDER THE SKIN INTO THE APPROPRIATE AREA AS DIRECTED 1 (ONE) TIME PER WEEK., Disp: 1.5 mL, Rfl: 5  •  Pain Reliever Extra Strength 500 MG tablet, Take 1 tablet by mouth Every 6 (Six) Hours As Needed. for pain, Disp: , Rfl:   •  pantoprazole (PROTONIX) 40 MG EC tablet, Take 1 tablet by mouth Daily for 120 days., Disp: 30 tablet, Rfl: 3  •  pregabalin (LYRICA) 150 MG capsule, TAKE 1 CAPSULE BY MOUTH 3 (THREE) TIMES A DAY., Disp: 90 capsule, Rfl: 0  •  Repatha SureClick solution auto-injector SureClick injection, INJECT 1 ML UNDER THE SKIN INTO THE APPROPRIATE AREA AS DIRECTED EVERY 14 (FOURTEEN) DAYS., Disp: 2 mL, Rfl: 5  •   "tiZANidine (ZANAFLEX) 4 MG tablet, Take 1 tablet by mouth Every 8 (Eight) Hours As Needed for Muscle Spasms., Disp: 270 tablet, Rfl: 5  •  vitamin B-12 (CYANOCOBALAMIN) 1000 MCG tablet, Take 1 tablet by mouth Daily., Disp: 90 tablet, Rfl: 1  •  vitamin D (ERGOCALCIFEROL) 1.25 MG (55437 UT) capsule capsule, Take 1 capsule by mouth 2 (Two) Times a Week., Disp: 8 capsule, Rfl: 5      Review of Systems   Constitutional: Negative for chills, decreased appetite, fever and weight loss.   HENT: Negative for congestion, nosebleeds and sore throat.    Eyes: Negative for blurred vision, visual disturbance and visual halos.   Cardiovascular: Positive for dyspnea on exertion. Negative for chest pain and leg swelling.   Respiratory: Positive for cough. Negative for shortness of breath, sputum production and wheezing.    Endocrine: Negative for cold intolerance and polyuria.   Hematologic/Lymphatic: Negative for bleeding problem. Does not bruise/bleed easily.   Skin: Positive for unusual hair distribution. Negative for flushing and nail changes.   Musculoskeletal: Positive for arthritis, back pain and joint pain.   Gastrointestinal: Negative for bloating, abdominal pain, hematemesis, melena, nausea and vomiting.   Genitourinary: Negative for flank pain and hematuria.   Neurological: Negative for brief paralysis, difficulty with concentration, focal weakness, light-headedness, loss of balance, numbness, paresthesias and weakness.   Psychiatric/Behavioral: Negative for altered mental status, depression, substance abuse and suicidal ideas.   Allergic/Immunologic: Negative for hives and persistent infections.         Vitals:    05/16/23 1301   BP: 110/70   BP Location: Left arm   Pulse: 98   Temp: 97.7 °F (36.5 °C)   TempSrc: Temporal   SpO2: 96%   Weight: 52.2 kg (115 lb)   Height: 152.4 cm (60\")     Physical Exam  Vitals reviewed.   Constitutional:       Appearance: Normal appearance.   HENT:      Head: Normocephalic.      Nose: " Nose normal.      Mouth/Throat:      Mouth: Mucous membranes are moist.   Eyes:      Pupils: Pupils are equal, round, and reactive to light.   Cardiovascular:      Rate and Rhythm: Normal rate.      Pulses: Normal pulses.   Pulmonary:      Effort: Pulmonary effort is normal.   Abdominal:      General: Abdomen is flat.      Palpations: Abdomen is soft.   Musculoskeletal:      Cervical back: Normal range of motion.      Right lower leg: No edema.      Left lower leg: No edema.   Skin:     General: Skin is warm and dry.      Capillary Refill: Capillary refill takes 2 to 3 seconds.      Comments: Left subclavian incision clean dry and intact, proximal sternal incision clean dry and intact   Neurological:      Mental Status: She is alert. Mental status is at baseline.   Psychiatric:         Mood and Affect: Mood normal.         Assessment & Plan     Independent Review of Studies    1. Non-small cell cancer of right lung  Metastatic, possible stage IV disease.  Patient is discussing options with oncology for palliative therapies.  Currently undecided on cancer navigator Zina Hilario and to discuss with patient as well.    2. Surgical aftercare, respiratory system  Clean operative site with antibacterial soap/water, pat dry. Keep open to air unless draining, then may apply dry dressing.  No ointments or creams unless prescribed by provider.    Incisions are clean dry and intact, no tub baths x4 weeks from surgery date follow-up as needed    Detailed discussion regarding risks, benefits, and treatment plan. Images independently reviewed. Patient understands, agrees, and wishes to proceed with plan.       This document has been electronically signed by SHARMILA MarieCNP-BC Nikky  On May 16, 2023 14:45 CDT

## 2023-05-16 NOTE — PATIENT INSTRUCTIONS
Clean operative site with antibacterial soap/water, pat dry. Keep open to air unless draining, then may apply dry dressing.  No ointments or creams unless prescribed by provider.    Return as needed

## 2023-05-17 ENCOUNTER — DOCUMENTATION (OUTPATIENT)
Dept: ONCOLOGY | Facility: CLINIC | Age: 58
End: 2023-05-17
Payer: MEDICARE

## 2023-05-19 ENCOUNTER — TELEPHONE (OUTPATIENT)
Dept: NUTRITION | Facility: HOSPITAL | Age: 58
End: 2023-05-19
Payer: MEDICARE

## 2023-05-19 NOTE — PROGRESS NOTES
Adult Outpatient Nutrition  Assessment    Patient Name:  Isabel Villalba  YOB: 1965  MRN: [id number]    Assessment Date:  Entry from 5/18/2023    Comments:  Attempted follow-up call without success. Left voice mail stressing importance of nutrition. Urged supplementation (of pt's choice).                    Electronically signed by:  Venessa Desai RD  05/19/23 13:26 CDT

## 2023-05-19 NOTE — PROGRESS NOTES
Treatment option consisting of carboplatin, Taxol and Keytruda was ordered.  Insurance was not approving treatment regimen.  Case was discussed with  dr. Garcia at [phone].  Even after discussing with him he is not willing to approve Keytruda at this point until EGFR and ROS testing and ALK testing is available.  Discussion and situation with approval of treatment was discussed with patient today over the phone as well.  Option of starting chemotherapy only with carboplatin and Taxol in view of her stage IV cancer with liver and bone metastases was discussed with patient today.  Patient wants to wait until [employer] advance results are back she does not want to try chemotherapy by itself at this point.  We will notify patient about [employer] advance once available and reorder treatment accordingly at that point.

## 2023-05-22 ENCOUNTER — TELEPHONE (OUTPATIENT)
Dept: FAMILY MEDICINE CLINIC | Facility: CLINIC | Age: 58
End: 2023-05-22
Payer: MEDICARE

## 2023-05-22 ENCOUNTER — DOCUMENTATION (OUTPATIENT)
Dept: ONCOLOGY | Facility: HOSPITAL | Age: 58
End: 2023-05-22
Payer: MEDICARE

## 2023-05-22 ENCOUNTER — LAB (OUTPATIENT)
Dept: LAB | Facility: OTHER | Age: 58
End: 2023-05-22
Payer: MEDICARE

## 2023-05-22 DIAGNOSIS — S36.09XA SPLENIC RUPTURE: ICD-10-CM

## 2023-05-22 LAB
ANISOCYTOSIS BLD QL: ABNORMAL
DEPRECATED RDW RBC AUTO: 49.7 FL (ref 37–54)
ERYTHROCYTE [DISTWIDTH] IN BLOOD BY AUTOMATED COUNT: 18.4 % (ref 12.3–15.4)
HCT VFR BLD AUTO: 26.5 % (ref 34–46.6)
HGB BLD-MCNC: 8 G/DL (ref 12–15.9)
HYPOCHROMIA BLD QL: ABNORMAL
LYMPHOCYTES # BLD MANUAL: 1.85 10*3/MM3 (ref 0.7–3.1)
LYMPHOCYTES NFR BLD MANUAL: 11 % (ref 5–12)
MCH RBC QN AUTO: 23.7 PG (ref 26.6–33)
MCHC RBC AUTO-ENTMCNC: 30.2 G/DL (ref 31.5–35.7)
MCV RBC AUTO: 78.4 FL (ref 79–97)
MICROCYTES BLD QL: ABNORMAL
MONOCYTES # BLD: 1.45 10*3/MM3 (ref 0.1–0.9)
NEUTROPHILS # BLD AUTO: 9.89 10*3/MM3 (ref 1.7–7)
NEUTROPHILS NFR BLD MANUAL: 73 % (ref 42.7–76)
NEUTS BAND NFR BLD MANUAL: 2 % (ref 0–5)
PLATELET # BLD AUTO: 980 10*3/MM3 (ref 140–450)
PMV BLD AUTO: 8.2 FL (ref 6–12)
RBC # BLD AUTO: 3.38 10*6/MM3 (ref 3.77–5.28)
SMALL PLATELETS BLD QL SMEAR: ABNORMAL
VARIANT LYMPHS NFR BLD MANUAL: 1 % (ref 0–5)
VARIANT LYMPHS NFR BLD MANUAL: 13 % (ref 19.6–45.3)
WBC MORPH BLD: NORMAL
WBC NRBC COR # BLD: 13.19 10*3/MM3 (ref 3.4–10.8)

## 2023-05-22 PROCEDURE — 36415 COLL VENOUS BLD VENIPUNCTURE: CPT | Performed by: FAMILY MEDICINE

## 2023-05-22 PROCEDURE — 85025 COMPLETE CBC W/AUTO DIFF WBC: CPT | Performed by: FAMILY MEDICINE

## 2023-05-22 NOTE — TELEPHONE ENCOUNTER
Incoming Refill Request      Medication requested (name and dose):   HYDROcodone-acetaminophen (NORCO)  MG per tablet  Pharmacy where request should be sent:   [facility]     Additional details provided by patient:     Best call back number:     Does the patient have less than a 3 day supply:  [] Yes  [] No    Jh Ann Rep  05/22/23, 15:14 CDT

## 2023-05-23 ENCOUNTER — TELEPHONE (OUTPATIENT)
Dept: FAMILY MEDICINE CLINIC | Facility: CLINIC | Age: 58
End: 2023-05-23
Payer: MEDICARE

## 2023-05-23 NOTE — TELEPHONE ENCOUNTER
TREY Nino 05/24/2023  Contacted the patient and informed her of the message. She stated understanding and thanked me.    Radha Simental MD  You (1:45 PM) 05/24/2023  Let her know that I checked with Dr. Duckworth and he said she is scheduled for some iron infusions in the near future and would not recommend transfusion unless it is down to 7 or below.     TREY Nino 05/23/2023  Patient contacted the office regarding recent labs drawn. She stated her hemoglobin is 8.0 g/dL and was informed by Dr. Duckworth and her PCP that action would need to be taken if levels reached 8.0.

## 2023-05-30 ENCOUNTER — LAB (OUTPATIENT)
Dept: LAB | Facility: OTHER | Age: 58
End: 2023-05-30

## 2023-05-30 DIAGNOSIS — S36.09XA SPLENIC RUPTURE: ICD-10-CM

## 2023-05-30 DIAGNOSIS — M51.27 LUMBAGO-SCIATICA DUE TO DISPLACEMENT OF LUMBAR INTERVERTEBRAL DISC: ICD-10-CM

## 2023-05-30 LAB
ANISOCYTOSIS BLD QL: ABNORMAL
DEPRECATED RDW RBC AUTO: 48.7 FL (ref 37–54)
ERYTHROCYTE [DISTWIDTH] IN BLOOD BY AUTOMATED COUNT: 18.3 % (ref 12.3–15.4)
HCT VFR BLD AUTO: 25.7 % (ref 34–46.6)
HGB BLD-MCNC: 7.8 G/DL (ref 12–15.9)
HYPOCHROMIA BLD QL: ABNORMAL
LARGE PLATELETS: ABNORMAL
LYMPHOCYTES # BLD MANUAL: 2.23 10*3/MM3 (ref 0.7–3.1)
LYMPHOCYTES NFR BLD MANUAL: 11 % (ref 5–12)
MCH RBC QN AUTO: 23.4 PG (ref 26.6–33)
MCHC RBC AUTO-ENTMCNC: 30.4 G/DL (ref 31.5–35.7)
MCV RBC AUTO: 77.2 FL (ref 79–97)
MICROCYTES BLD QL: ABNORMAL
MONOCYTES # BLD: 1.64 10*3/MM3 (ref 0.1–0.9)
NEUTROPHILS # BLD AUTO: 11.01 10*3/MM3 (ref 1.7–7)
NEUTROPHILS NFR BLD MANUAL: 74 % (ref 42.7–76)
PLATELET # BLD AUTO: 916 10*3/MM3 (ref 140–450)
PMV BLD AUTO: 8.3 FL (ref 6–12)
RBC # BLD AUTO: 3.33 10*6/MM3 (ref 3.77–5.28)
REF LAB TEST RESULTS: NORMAL
SMALL PLATELETS BLD QL SMEAR: ABNORMAL
VARIANT LYMPHS NFR BLD MANUAL: 15 % (ref 19.6–45.3)
WBC MORPH BLD: NORMAL
WBC NRBC COR # BLD: 14.88 10*3/MM3 (ref 3.4–10.8)

## 2023-05-30 PROCEDURE — 36415 COLL VENOUS BLD VENIPUNCTURE: CPT | Performed by: FAMILY MEDICINE

## 2023-05-30 PROCEDURE — 85025 COMPLETE CBC W/AUTO DIFF WBC: CPT | Performed by: FAMILY MEDICINE

## 2023-05-30 RX ORDER — HYDROCODONE BITARTRATE AND ACETAMINOPHEN 10; 325 MG/1; MG/1
1 TABLET ORAL 4 TIMES DAILY PRN
Qty: 120 TABLET | Refills: 0 | Status: SHIPPED | OUTPATIENT
Start: 2023-05-30

## 2023-06-06 ENCOUNTER — DOCUMENTATION (OUTPATIENT)
Dept: ONCOLOGY | Facility: HOSPITAL | Age: 58
End: 2023-06-06
Payer: MEDICARE

## 2023-06-07 ENCOUNTER — TELEPHONE (OUTPATIENT)
Dept: FAMILY MEDICINE CLINIC | Facility: CLINIC | Age: 58
End: 2023-06-07
Payer: MEDICARE

## 2023-06-07 ENCOUNTER — LAB (OUTPATIENT)
Dept: LAB | Facility: OTHER | Age: 58
End: 2023-06-07
Payer: MEDICARE

## 2023-06-07 ENCOUNTER — PRIOR AUTHORIZATION (OUTPATIENT)
Dept: FAMILY MEDICINE CLINIC | Facility: CLINIC | Age: 58
End: 2023-06-07
Payer: MEDICARE

## 2023-06-07 DIAGNOSIS — G89.3 CANCER RELATED PAIN: Primary | ICD-10-CM

## 2023-06-07 DIAGNOSIS — Z79.899 OTHER LONG TERM (CURRENT) DRUG THERAPY: ICD-10-CM

## 2023-06-07 DIAGNOSIS — C34.91 NON-SMALL CELL CANCER OF RIGHT LUNG: ICD-10-CM

## 2023-06-07 DIAGNOSIS — S36.09XA SPLENIC RUPTURE: ICD-10-CM

## 2023-06-07 DIAGNOSIS — C79.51 METASTASIS TO BONE: ICD-10-CM

## 2023-06-07 LAB
ALBUMIN SERPL-MCNC: 3 G/DL (ref 3.5–5)
ALBUMIN/GLOB SERPL: 0.7 G/DL (ref 1.1–1.8)
ALP SERPL-CCNC: 119 U/L (ref 38–126)
ALT SERPL W P-5'-P-CCNC: 13 U/L
ANION GAP SERPL CALCULATED.3IONS-SCNC: 6 MMOL/L (ref 5–15)
ANISOCYTOSIS BLD QL: ABNORMAL
AST SERPL-CCNC: 16 U/L (ref 14–36)
BILIRUB SERPL-MCNC: 0.3 MG/DL (ref 0.2–1.3)
BUN SERPL-MCNC: 10 MG/DL (ref 7–23)
BUN/CREAT SERPL: 16.7 (ref 7–25)
CALCIUM SPEC-SCNC: 10.1 MG/DL (ref 8.4–10.2)
CHLORIDE SERPL-SCNC: 101 MMOL/L (ref 101–112)
CO2 SERPL-SCNC: 30 MMOL/L (ref 22–30)
CREAT SERPL-MCNC: 0.6 MG/DL (ref 0.52–1.04)
DEPRECATED RDW RBC AUTO: 50.1 FL (ref 37–54)
EGFRCR SERPLBLD CKD-EPI 2021: 104.2 ML/MIN/1.73
ERYTHROCYTE [DISTWIDTH] IN BLOOD BY AUTOMATED COUNT: 19 % (ref 12.3–15.4)
GLOBULIN UR ELPH-MCNC: 4.6 GM/DL (ref 2.3–3.5)
GLUCOSE SERPL-MCNC: 159 MG/DL (ref 70–99)
HCT VFR BLD AUTO: 26.5 % (ref 34–46.6)
HGB BLD-MCNC: 8.1 G/DL (ref 12–15.9)
HYPOCHROMIA BLD QL: ABNORMAL
LYMPHOCYTES # BLD MANUAL: 1.95 10*3/MM3 (ref 0.7–3.1)
LYMPHOCYTES NFR BLD MANUAL: 9 % (ref 5–12)
MCH RBC QN AUTO: 23.4 PG (ref 26.6–33)
MCHC RBC AUTO-ENTMCNC: 30.6 G/DL (ref 31.5–35.7)
MCV RBC AUTO: 76.6 FL (ref 79–97)
MONOCYTES # BLD: 1.35 10*3/MM3 (ref 0.1–0.9)
NEUTROPHILS # BLD AUTO: 11.71 10*3/MM3 (ref 1.7–7)
NEUTROPHILS NFR BLD MANUAL: 76 % (ref 42.7–76)
NEUTS BAND NFR BLD MANUAL: 2 % (ref 0–5)
PLATELET # BLD AUTO: 1005 10*3/MM3 (ref 140–450)
PMV BLD AUTO: 8.4 FL (ref 6–12)
POTASSIUM SERPL-SCNC: 3.5 MMOL/L (ref 3.4–5)
PROT SERPL-MCNC: 7.6 G/DL (ref 6.3–8.6)
RBC # BLD AUTO: 3.46 10*6/MM3 (ref 3.77–5.28)
SCAN SLIDE: NORMAL
SMALL PLATELETS BLD QL SMEAR: ABNORMAL
SODIUM SERPL-SCNC: 137 MMOL/L (ref 137–145)
T4 FREE SERPL-MCNC: 1.18 NG/DL (ref 0.93–1.7)
TSH SERPL DL<=0.05 MIU/L-ACNC: 1.08 UIU/ML (ref 0.27–4.2)
VARIANT LYMPHS NFR BLD MANUAL: 13 % (ref 19.6–45.3)
WBC MORPH BLD: NORMAL
WBC NRBC COR # BLD: 15.01 10*3/MM3 (ref 3.4–10.8)

## 2023-06-07 PROCEDURE — 80053 COMPREHEN METABOLIC PANEL: CPT | Performed by: INTERNAL MEDICINE

## 2023-06-07 PROCEDURE — 85025 COMPLETE CBC W/AUTO DIFF WBC: CPT | Performed by: FAMILY MEDICINE

## 2023-06-07 PROCEDURE — 84443 ASSAY THYROID STIM HORMONE: CPT | Performed by: INTERNAL MEDICINE

## 2023-06-07 PROCEDURE — 36415 COLL VENOUS BLD VENIPUNCTURE: CPT | Performed by: INTERNAL MEDICINE

## 2023-06-07 PROCEDURE — 84439 ASSAY OF FREE THYROXINE: CPT | Performed by: INTERNAL MEDICINE

## 2023-06-07 RX ORDER — LIDOCAINE 50 MG/G
3 PATCH TOPICAL EVERY 24 HOURS
Qty: 90 PATCH | Refills: 5 | Status: SHIPPED | OUTPATIENT
Start: 2023-06-07

## 2023-06-07 NOTE — TELEPHONE ENCOUNTER
She is wanting to know if you could call in lidocaine patch for her back pain due to cancer .. clinic pharm in [place]

## 2023-06-08 ENCOUNTER — OFFICE VISIT (OUTPATIENT)
Dept: HEMATOLOGY/ONCOLOGY | Facility: CLINIC | Age: 58
End: 2023-06-08
Payer: MEDICARE

## 2023-06-08 ENCOUNTER — TELEPHONE (OUTPATIENT)
Dept: ONCOLOGY | Facility: CLINIC | Age: 58
End: 2023-06-08
Payer: MEDICARE

## 2023-06-08 VITALS
DIASTOLIC BLOOD PRESSURE: 63 MMHG | SYSTOLIC BLOOD PRESSURE: 122 MMHG | HEIGHT: 60 IN | WEIGHT: 109 LBS | OXYGEN SATURATION: 98 % | TEMPERATURE: 98.1 F | BODY MASS INDEX: 21.4 KG/M2 | HEART RATE: 90 BPM

## 2023-06-08 DIAGNOSIS — E53.8 VITAMIN B12 DEFICIENCY: ICD-10-CM

## 2023-06-08 DIAGNOSIS — C34.91 NON-SMALL CELL CANCER OF RIGHT LUNG: Primary | Chronic | ICD-10-CM

## 2023-06-08 DIAGNOSIS — C79.51 METASTASIS TO BONE: Chronic | ICD-10-CM

## 2023-06-08 DIAGNOSIS — D50.0 IRON DEFICIENCY ANEMIA DUE TO CHRONIC BLOOD LOSS: Chronic | ICD-10-CM

## 2023-06-08 DIAGNOSIS — T45.4X5A ADVERSE EFFECT OF IRON, INITIAL ENCOUNTER: Chronic | ICD-10-CM

## 2023-06-08 DIAGNOSIS — Z79.899 OTHER LONG TERM (CURRENT) DRUG THERAPY: ICD-10-CM

## 2023-06-08 PROBLEM — D50.9 IRON DEFICIENCY ANEMIA: Chronic | Status: ACTIVE | Noted: 2023-05-09

## 2023-06-08 LAB — REF LAB TEST METHOD: NORMAL

## 2023-06-08 NOTE — PROGRESS NOTES
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  Patient was last seen here at clinic on May 9, 2023 and at that point plan was to start patient on carboplatin, Taxol and Keytruda however insurance company did not approve chemotherapy unless molecular testing has been done to rule out any other targetable mutation.  Patient is here to discuss the result of Xigeniseq advance and further recommendation.  Complains of fatigue.  Complains of back pain.  Complains of lower extremity pain.  Denies any new lymph node enlargement.  Complains of intermittent tingling and numbness affecting bilateral hands.                  Past Medical History, Past Surgical History, Social History, Family History have been reviewed and are without significant changes except as mentioned.    Review of Systems   A comprehensive 14 point review of systems was performed and was negative except as mentioned in HPI.    Medications:  The current medication list was reviewed in the EMR    ALLERGIES:    Allergies   Allergen Reactions    Morphine And Related Nausea And Vomiting       Objective      Vitals:    06/08/23 1424   BP: 122/63   Pulse: 90   Temp: 98.1 °F (36.7 °C)   TempSrc: Temporal   SpO2: 98%   Weight: 49.4 kg (109 lb)   Height: 152.4  "cm (60\")   PainSc:   8   PainLoc: Leg  Comment: both legs and feet          No data to display                Physical Exam  Pulmonary:      Breath sounds: Normal breath sounds.   Neurological:      Mental Status: She is alert and oriented to person, place, and time.         RECENT LABS:  Glucose   Date Value Ref Range Status   06/07/2023 159 (H) 70 - 99 mg/dL Final     Sodium   Date Value Ref Range Status   06/07/2023 137 137 - 145 mmol/L Final   04/11/2023 136 136 - 145 mmol/L Final     Potassium   Date Value Ref Range Status   06/07/2023 3.5 3.4 - 5.0 mmol/L Final   04/11/2023 3.1 (L) 3.5 - 5.1 mmol/L Final     CO2   Date Value Ref Range Status   06/07/2023 30.0 22.0 - 30.0 mmol/L Final     Chloride   Date Value Ref Range Status   06/07/2023 101 101 - 112 mmol/L Final   04/11/2023 98 98 - 107 mmol/L Final     Anion Gap   Date Value Ref Range Status   06/07/2023 6.0 5.0 - 15.0 mmol/L Final     Creatinine   Date Value Ref Range Status   06/07/2023 0.60 0.52 - 1.04 mg/dL Final   04/11/2023 0.7 0.6 - 1.0 mg/dL Final     BUN   Date Value Ref Range Status   06/07/2023 10 7 - 23 mg/dL Final   04/11/2023 6 (L) 7 - 18 mg/dL Final     BUN/Creatinine Ratio   Date Value Ref Range Status   06/07/2023 16.7 7.0 - 25.0 Final     Calcium   Date Value Ref Range Status   06/07/2023 10.1 8.4 - 10.2 mg/dL Final   04/11/2023 9.2 8.5 - 10.1 mg/dL Final     eGFR Non  Amer   Date Value Ref Range Status   07/01/2021 80 51 - 120 mL/min/1.73 Final     Alkaline Phosphatase   Date Value Ref Range Status   06/07/2023 119 38 - 126 U/L Final   04/11/2023 97 46 - 116 U/L Final     Total Protein   Date Value Ref Range Status   06/07/2023 7.6 6.3 - 8.6 g/dL Final   04/11/2023 7.6 6.4 - 8.2 g/dL Final     ALT (SGPT)   Date Value Ref Range Status   06/07/2023 13 <=35 U/L Final   04/11/2023 11 (L) 14 - 59 U/L Final     AST (SGOT)   Date Value Ref Range Status   06/07/2023 16 14 - 36 U/L Final   04/11/2023 10 (L) 15 - 37 U/L Final     Total " Bilirubin   Date Value Ref Range Status   2023 0.3 0.2 - 1.3 mg/dL Final   2023 0.20 0.20 - 1.00 mg/dL Final     Albumin   Date Value Ref Range Status   2023 3.0 (L) 3.5 - 5.0 g/dL Final   2023 1.9 (L) 3.4 - 5.0 g/dL Final     Globulin   Date Value Ref Range Status   2023 4.6 (H) 2.3 - 3.5 gm/dL Final     Lab Results   Component Value Date    WBC 15.01 (H) 2023    HGB 8.1 (L) 2023    HCT 26.5 (L) 2023    MCV 76.6 (L) 2023    PLT 1,005 (C) 2023     Lab Results   Component Value Date    NEUTROABS 11.71 (H) 2023    IRON 9 (L) 2023    TIBC 194 (L) 2023    LABIRON 5 (L) 2023    FERRITIN 386.30 (H) 2023    AUSXJYRW66 327 2023    CHJKAWOP37 210 (L) 2022    DFQISVWQ66 242 2021    FOLATE >20.00 2023    FOLATE 5.27 2022    FOLATE 5.33 2021     Lab Results   Component Value Date    REFLABREPO  2023     [facility]  [address] [place] [address]  Phone: [phone] or [phone]  Fax: [phone]  Daniel Rosenbaum M.D., Medical Director    PATIENT NAME                           LABORATORY NO.  [address]  LAKISHA EDWARDS.                 [id number]  [id number]                         AGE              SEX  SSN           CLIENT REF #  [facility]           57      1965  F    [id number]   [id number]    [place]                       REQUESTING M.D.     ATTENDING MKARI     COPY TO[address]                 [place] [address]             DATE COLLECTED      DATE RECEIVED      DATE REPORTED  2023    DIAGNOSIS:  A.   LUNG, BIOPSY, RIGHT UPPER LOBE:  Benign bronchial mucosa  B.   LUNG, BIOPSY, RIGHT UPPER LOBE:  Benign bronchial mucosa  C.   LYMPH NODE, 4R:  Poorly differentiated carcinoma, non-small cell, metastatic  D.   LYMPH NODE,  4R:  Poorly differentiated carcinoma, non-small cell, metastatic  E.   LYMPH NODE, 2R:  One benign lymph node, negative for metastatic malignancy    JBS/sm    COMMENT:  I ordered a panel of immunohistochemical (IHC) stains on block  D1 to assist with the differential diagnosis between squamous cell carcinoma,  adenocarcinoma, neuroendocrine carcinoma, and malignant melanoma.  Tumor  cells are strongly and diffusely positive for cytokeratin cocktail (AE1/AE3) IHC  stain, and are negative on IHC staining for napsin A, TTF-1, chromogranin,  synaptophysin, p40, and SOX-10.  All control tissue stains as expected.    Dr. Shayy Barrios has reviewed the H&E morphology and agrees with the  diagnoses.  The findings were discussed with Dr. Porter and Dr. Duckworth in the  morning on May 09, 2023.    CLINICAL HISTORY:  Lung mass    SPECIMENS RECEIVED:  A.  LUNG, BIOPSY, RIGHT UPPER LOBE  B.  LUNG, BIOPSY, RIGHT UPPER LOBE  C.  LYMPH NODE, 4R  D.  LYMPH NODE, 4R  E.  LYMPH NODE, 2R    FROZEN SECTION INTERPRETATION:  Frozen section performed by Dr. Rust at [facility] (refer to above for address).  Frozen  section diagnosis C:  Malignant.  Frozen section stains are acceptable.  The  patient ID is verified and a verbal report was given to Dr. Porter by Dr. Rust. Total time 17 minutes.    MICROSCOPIC DESCRIPTION:  Tissue blocks are prepared and slides are examined microscopically on all  specimens. See diagnosis for details.    The internal and external (both positive and negative) controls reacted  appropriately. Some of our immunohistochemical and in situ hybridization  studies are performed as analyte specific reagents. The following statement  applies to those tests: This test was developed, and its performance  characteristics determined by [facility]. It has not been  cleared or approved by the US Food and Drug Administration. However, the  FDA has determined that approval and  "clearance are not necessary.    Professional interpretation rendered by Broderick Rust M.D. at [email or web address],  [facility], [address] [place] [address].    GROSS DESCRIPTION:  A.  Specimen received in formalin labeled \"lung, right upper lobe biopsy\" and  consists of a scant amount of soft tissue measuring 0.1 x 0.1 x 0.1 cm in  aggregate.  Specimen is filtered and submitted in its entirety in a single  cassette.  B.  Specimen received in formalin labeled \"lung, right upper lobe biopsy\" and  consists of multiple minute portions of tan soft tissue measuring 0.2 x 0.1 x  0.1 cm in aggregate.  Specimen is filtered and submitted in its entirety in 2  cassettes.  C.  Specimen received in formalin labeled \"lymph node 4 right frozen section\"  and consists of a 0.5 x 0.5 x 0.4 cm lymph node.  Specimen is entirely  frozen and resubmitted in cassette designated C1.  D.  Specimen received in formalin labeled \"lymph node 4 right\" and consists  of multiple fragmented portions of tan-yellow soft nodular tissue  measuring 2.0 x 1.5 x 0.7 cm in aggregate.  Specimen is filtered and  submitted in its entirety in 3 cassettes.  E.  Specimen received in formalin labeled \"lymph node 2 right\" and consists  of a 0.4 x 0.3 x 0.2 cm portion of tan soft tissue.  Specimen is submitted in  its entirety in a single cassette as received.  JTM    REVIEWED, DIAGNOSED AND ELECTRONICALLY  SIGNED BY:    Broderick Rust M.D.  CPT CODES:  88305x5, 02055, 88341x6, 29562      REFLABREPO  2023     [facility]  [address] [place] [address]  Phone: [phone] or [phone]  Fax: [phone]  Daniel Rosenbaum M.D., Medical Director    PATIENT NAME                                 LABORATORY NO.  [address]   LAKISHA EDWARDS.                       [id number]  [id number]                                     AGE                SEX     SSN            CLIENT REF #  [facility]                     "   57       1965          [id number]    [id number]  [place]                                   REQUESTING M.D.       ATTENDING M.D..        COPY TO..  [address] [place] [address]                             [place] [address]                         DATE COLLECTED        DATE RECEIVED          DATE REPORTED  05/03/2023 05/03/2023 05/05/2023    DIAGNOSIS:  A.      BRONCH WASH:  Negative for malignant cells.  B.      BRONCH BRUSH:  Negative for malignant cells.    MICROSCOPIC DESCRIPTION:  A.     Pulmonary macrophages and acute inflammatory cells present  B.     Reactive bronchial cells are present    Professional interpretation rendered by Shayy Barrios D.O. at [employer], [address] [place] [address].    COMMENT:  Please correlate with concurrent lung biopsy ([id number]; pending at time of  signout of this report).    CLINICAL HISTORY:  Lung mass    SPECIMENS SUBMITTED:  A.    BRONCH WASH  B.    BRONCH BRUSH    GROSS SPECIMEN DESCRIPTION:  A.     40cc of opaque red fluid in fixative  Cell block has been examined.  B.     4 premade alcohol smears    CYTOTECHNOLOGIST:         SDS, CT (ASCP)                       REVIEWED, DIAGNOSED AND ELECTRONICALLY  SIGNED BY:    Shayy Barrios D.O.  CPT CODES:  88112x2, 12176                     PATHOLOGY:  * Cannot find OR log *         RADIOLOGY DATA :  No radiology results for the last 7 days        Assessment & Plan     1.  Poorly differentiated non-small cell cancer of right lung with liver, bone metastasis stage IV, PDL1 70%, tumor mutational burden high, MSI stable, ALK BRAF, EGFR, NTRK RET mutation negative  -Initial plan was to start patient on treatment with carboplatin, Taxol and Keytruda upon last clinic visit on May 9, 2023.  However insurance company did not approve chemotherapy regimen when after doing Peer to peer with physician on insurance company.  At that point patient opted to wait until many CT  scans are available  - MRI of brain on May 20, 2023 was negative for any brain metastasis.  - Omniseq Insight testing shows PD-L1 positivity at 70% with negative EGFR, ALK, ROS, BRAF MET and NTRK mutation.  Results of Omniseq advance and implications were discussed with patient  - Since PD-L1 is greater than 50%.  Recommend single agent immunotherapy with Keytruda.  - Side effect of immunotherapy of Keytruda including but not limited to immune mediated pneumonitis, hepatitis, colitis, thyroiditis, encephalitis, nephritis, pan hypopituitarism, dermatitis and skin rash were discussed with the patient.  Patient has been provided information about Keytruda last clinic visit.  - Plan is to start Keytruda next week once we get it approved through insurance company.  - We will do labs and cycle 1 of Keytruda next week.  - Patient will return to clinic in 4 weeks with cycle 2 of Keytruda.  - Since there has been 6 weeks delay between her PET scan and starting chemotherapy recommend getting a restaging CT of chest abdomen and pelvis with contrast prior to cycle 2 of Keytruda for restaging purposes.    2.  Anemia:  - Secondary to splenectomy and blood loss.  - Currently on B12 p.o. daily.  - Has been started on intravenous Venofer.  Patient has so far received 2 doses of Venofer.  - We will repeat anemia work-up with cycle 3 of Keytruda.    3.  Leukocytosis and thrombocytosis:  - White blood cell count is 15,000, platelet count is 1005,000    4.  Hypertension    5.  Diabetes mellitus    6.  Health maintenance:  - Patient has been counseled about smoking cessation.               PHQ-9 Total Score: 0   -Patient is not homicidal or suicidal.  No acute intervention required.     Isabel Sierra Villalba reports a pain score of 8.  Given her pain assessment as noted, treatment options were discussed and the following options were decided upon as a follow-up plan to address the patient's pain: continuation of current treatment plan for  pain.         Brett Duckworth MD  6/8/2023  14:33 CDT        Part of this note may be an electronic transcription/translation of spoken language to printed text using the Dragon Dictation System.          CC:

## 2023-06-08 NOTE — TELEPHONE ENCOUNTER
Called pt no answer left message with all her appts starting on 6-12-23 for chemo education then her appt for lab chemo and dr gerber appt in 4 weeks dates and times were left on her voicemail

## 2023-06-14 ENCOUNTER — LAB (OUTPATIENT)
Dept: LAB | Facility: OTHER | Age: 58
End: 2023-06-14
Payer: MEDICARE

## 2023-06-14 DIAGNOSIS — Z79.899 OTHER LONG TERM (CURRENT) DRUG THERAPY: ICD-10-CM

## 2023-06-14 DIAGNOSIS — C79.51 METASTASIS TO BONE: Chronic | ICD-10-CM

## 2023-06-14 DIAGNOSIS — C34.91 NON-SMALL CELL CANCER OF RIGHT LUNG: Chronic | ICD-10-CM

## 2023-06-14 DIAGNOSIS — S36.09XA SPLENIC RUPTURE: ICD-10-CM

## 2023-06-14 LAB
ALBUMIN SERPL-MCNC: 2.8 G/DL (ref 3.5–5)
ALBUMIN/GLOB SERPL: 0.7 G/DL (ref 1.1–1.8)
ALP SERPL-CCNC: 116 U/L (ref 38–126)
ALT SERPL W P-5'-P-CCNC: 12 U/L
ANION GAP SERPL CALCULATED.3IONS-SCNC: 4 MMOL/L (ref 5–15)
ANISOCYTOSIS BLD QL: ABNORMAL
AST SERPL-CCNC: 16 U/L (ref 14–36)
BASOPHILS # BLD MANUAL: 0.17 10*3/MM3 (ref 0–0.2)
BASOPHILS NFR BLD MANUAL: 1 % (ref 0–1.5)
BILIRUB SERPL-MCNC: 0.3 MG/DL (ref 0.2–1.3)
BUN SERPL-MCNC: 9 MG/DL (ref 7–23)
BUN/CREAT SERPL: 14.1 (ref 7–25)
CALCIUM SPEC-SCNC: 9.8 MG/DL (ref 8.4–10.2)
CHLORIDE SERPL-SCNC: 98 MMOL/L (ref 101–112)
CO2 SERPL-SCNC: 30 MMOL/L (ref 22–30)
CREAT SERPL-MCNC: 0.64 MG/DL (ref 0.52–1.04)
DEPRECATED RDW RBC AUTO: 54.1 FL (ref 37–54)
EGFRCR SERPLBLD CKD-EPI 2021: 102.6 ML/MIN/1.73
ERYTHROCYTE [DISTWIDTH] IN BLOOD BY AUTOMATED COUNT: 21.1 % (ref 12.3–15.4)
GLOBULIN UR ELPH-MCNC: 3.9 GM/DL (ref 2.3–3.5)
GLUCOSE SERPL-MCNC: 236 MG/DL (ref 70–99)
HCT VFR BLD AUTO: 25.1 % (ref 34–46.6)
HGB BLD-MCNC: 7.7 G/DL (ref 12–15.9)
HYPOCHROMIA BLD QL: ABNORMAL
LARGE PLATELETS: ABNORMAL
LYMPHOCYTES # BLD MANUAL: 2.15 10*3/MM3 (ref 0.7–3.1)
LYMPHOCYTES NFR BLD MANUAL: 9 % (ref 5–12)
MCH RBC QN AUTO: 23.5 PG (ref 26.6–33)
MCHC RBC AUTO-ENTMCNC: 30.7 G/DL (ref 31.5–35.7)
MCV RBC AUTO: 76.5 FL (ref 79–97)
MONOCYTES # BLD: 1.49 10*3/MM3 (ref 0.1–0.9)
NEUTROPHILS # BLD AUTO: 12.75 10*3/MM3 (ref 1.7–7)
NEUTROPHILS NFR BLD MANUAL: 77 % (ref 42.7–76)
PLATELET # BLD AUTO: 813 10*3/MM3 (ref 140–450)
PMV BLD AUTO: 8.2 FL (ref 6–12)
POTASSIUM SERPL-SCNC: 3.3 MMOL/L (ref 3.4–5)
PROT SERPL-MCNC: 6.7 G/DL (ref 6.3–8.6)
RBC # BLD AUTO: 3.28 10*6/MM3 (ref 3.77–5.28)
SMALL PLATELETS BLD QL SMEAR: ABNORMAL
SODIUM SERPL-SCNC: 132 MMOL/L (ref 137–145)
T4 FREE SERPL-MCNC: 1.14 NG/DL (ref 0.93–1.7)
TSH SERPL DL<=0.05 MIU/L-ACNC: 0.94 UIU/ML (ref 0.27–4.2)
VARIANT LYMPHS NFR BLD MANUAL: 13 % (ref 19.6–45.3)
WBC MORPH BLD: NORMAL
WBC NRBC COR # BLD: 16.56 10*3/MM3 (ref 3.4–10.8)

## 2023-06-14 PROCEDURE — 80053 COMPREHEN METABOLIC PANEL: CPT | Performed by: INTERNAL MEDICINE

## 2023-06-14 PROCEDURE — 84443 ASSAY THYROID STIM HORMONE: CPT | Performed by: INTERNAL MEDICINE

## 2023-06-14 PROCEDURE — 36415 COLL VENOUS BLD VENIPUNCTURE: CPT | Performed by: INTERNAL MEDICINE

## 2023-06-14 PROCEDURE — 85025 COMPLETE CBC W/AUTO DIFF WBC: CPT | Performed by: FAMILY MEDICINE

## 2023-06-14 PROCEDURE — 84439 ASSAY OF FREE THYROXINE: CPT | Performed by: INTERNAL MEDICINE

## 2023-06-15 ENCOUNTER — INFUSION (OUTPATIENT)
Dept: ONCOLOGY | Facility: HOSPITAL | Age: 58
End: 2023-06-15
Payer: MEDICARE

## 2023-06-15 ENCOUNTER — HOSPITAL ENCOUNTER (OUTPATIENT)
Dept: ULTRASOUND IMAGING | Facility: HOSPITAL | Age: 58
Discharge: HOME | End: 2023-06-15
Payer: MEDICARE

## 2023-06-15 VITALS — HEART RATE: 95 BPM | DIASTOLIC BLOOD PRESSURE: 59 MMHG | TEMPERATURE: 98.3 F | SYSTOLIC BLOOD PRESSURE: 124 MMHG

## 2023-06-15 DIAGNOSIS — C79.51 METASTASIS TO BONE: Primary | ICD-10-CM

## 2023-06-15 DIAGNOSIS — M79.604 PAIN AND SWELLING OF RIGHT LOWER EXTREMITY: ICD-10-CM

## 2023-06-15 DIAGNOSIS — Z45.2 ENCOUNTER FOR VENOUS ACCESS DEVICE CARE: ICD-10-CM

## 2023-06-15 DIAGNOSIS — M79.89 PAIN AND SWELLING OF LEFT LOWER EXTREMITY: Primary | ICD-10-CM

## 2023-06-15 DIAGNOSIS — M79.605 PAIN AND SWELLING OF LEFT LOWER EXTREMITY: Primary | ICD-10-CM

## 2023-06-15 DIAGNOSIS — M79.89 PAIN AND SWELLING OF RIGHT LOWER EXTREMITY: ICD-10-CM

## 2023-06-15 DIAGNOSIS — Z79.899 OTHER LONG TERM (CURRENT) DRUG THERAPY: ICD-10-CM

## 2023-06-15 DIAGNOSIS — C34.91 NON-SMALL CELL CANCER OF RIGHT LUNG: ICD-10-CM

## 2023-06-15 DIAGNOSIS — C79.51 METASTASIS TO BONE: ICD-10-CM

## 2023-06-15 DIAGNOSIS — E87.6 HYPOKALEMIA: Primary | ICD-10-CM

## 2023-06-15 PROCEDURE — A9270 NON-COVERED ITEM OR SERVICE: HCPCS | Performed by: INTERNAL MEDICINE

## 2023-06-15 PROCEDURE — 93970 EXTREMITY STUDY: CPT

## 2023-06-15 PROCEDURE — 25010000002 PEMBROLIZUMAB 100 MG/4ML SOLUTION 4 ML VIAL: Performed by: INTERNAL MEDICINE

## 2023-06-15 PROCEDURE — 63710000001 POTASSIUM CHLORIDE 10 MEQ CAPSULE CONTROLLED-RELEASE: Performed by: INTERNAL MEDICINE

## 2023-06-15 RX ORDER — SODIUM CHLORIDE 0.9 % (FLUSH) 0.9 %
10 SYRINGE (ML) INJECTION AS NEEDED
Status: DISCONTINUED | OUTPATIENT
Start: 2023-06-15 | End: 2023-06-15 | Stop reason: HOSPADM

## 2023-06-15 RX ORDER — POTASSIUM CHLORIDE 750 MG/1
40 CAPSULE, EXTENDED RELEASE ORAL ONCE
Status: COMPLETED | OUTPATIENT
Start: 2023-06-15 | End: 2023-06-15

## 2023-06-15 RX ORDER — SODIUM CHLORIDE 9 MG/ML
250 INJECTION, SOLUTION INTRAVENOUS ONCE
Status: COMPLETED | OUTPATIENT
Start: 2023-06-15 | End: 2023-06-15

## 2023-06-15 RX ORDER — HEPARIN SODIUM (PORCINE) LOCK FLUSH IV SOLN 100 UNIT/ML 100 UNIT/ML
500 SOLUTION INTRAVENOUS AS NEEDED
OUTPATIENT
Start: 2023-06-15

## 2023-06-15 RX ORDER — HEPARIN SODIUM (PORCINE) LOCK FLUSH IV SOLN 100 UNIT/ML 100 UNIT/ML
500 SOLUTION INTRAVENOUS AS NEEDED
Status: DISCONTINUED | OUTPATIENT
Start: 2023-06-15 | End: 2023-06-15 | Stop reason: HOSPADM

## 2023-06-15 RX ORDER — SODIUM CHLORIDE 0.9 % (FLUSH) 0.9 %
10 SYRINGE (ML) INJECTION AS NEEDED
OUTPATIENT
Start: 2023-06-15

## 2023-06-15 RX ORDER — POTASSIUM CHLORIDE 750 MG/1
40 CAPSULE, EXTENDED RELEASE ORAL ONCE
Status: CANCELLED
Start: 2023-06-15 | End: 2023-06-15

## 2023-06-15 RX ORDER — SODIUM CHLORIDE 9 MG/ML
250 INJECTION, SOLUTION INTRAVENOUS ONCE
Status: CANCELLED | OUTPATIENT
Start: 2023-06-15

## 2023-06-15 RX ADMIN — SODIUM CHLORIDE 250 ML: 9 INJECTION, SOLUTION INTRAVENOUS at 10:40

## 2023-06-15 RX ADMIN — POTASSIUM CHLORIDE 40 MEQ: 10 CAPSULE, COATED, EXTENDED RELEASE ORAL at 10:37

## 2023-06-15 RX ADMIN — HEPARIN SODIUM (PORCINE) LOCK FLUSH IV SOLN 100 UNIT/ML 500 UNITS: 100 SOLUTION at 11:59

## 2023-06-15 RX ADMIN — Medication 10 ML: at 11:59

## 2023-06-15 RX ADMIN — SODIUM CHLORIDE 200 MG: 9 INJECTION, SOLUTION INTRAVENOUS at 11:09

## 2023-07-17 ENCOUNTER — TELEPHONE (OUTPATIENT)
Dept: NUTRITION | Facility: HOSPITAL | Age: 58
End: 2023-07-17
Payer: MEDICARE

## 2023-07-18 ENCOUNTER — TELEPHONE (OUTPATIENT)
Dept: ONCOLOGY | Facility: CLINIC | Age: 58
End: 2023-07-18
Payer: MEDICARE

## 2023-07-27 ENCOUNTER — OFFICE VISIT (OUTPATIENT)
Dept: ONCOLOGY | Facility: CLINIC | Age: 58
End: 2023-07-27
Payer: MEDICARE

## 2023-07-27 ENCOUNTER — INFUSION (OUTPATIENT)
Dept: ONCOLOGY | Facility: HOSPITAL | Age: 58
End: 2023-07-27
Payer: MEDICARE

## 2023-07-27 VITALS
WEIGHT: 112.8 LBS | BODY MASS INDEX: 22.15 KG/M2 | HEIGHT: 60 IN | HEART RATE: 90 BPM | TEMPERATURE: 97.4 F | DIASTOLIC BLOOD PRESSURE: 55 MMHG | OXYGEN SATURATION: 92 % | SYSTOLIC BLOOD PRESSURE: 103 MMHG

## 2023-07-27 DIAGNOSIS — D50.0 IRON DEFICIENCY ANEMIA DUE TO CHRONIC BLOOD LOSS: Chronic | ICD-10-CM

## 2023-07-27 DIAGNOSIS — E53.8 VITAMIN B12 DEFICIENCY: Chronic | ICD-10-CM

## 2023-07-27 DIAGNOSIS — Z79.899 OTHER LONG TERM (CURRENT) DRUG THERAPY: ICD-10-CM

## 2023-07-27 DIAGNOSIS — C79.51 METASTASIS TO BONE: ICD-10-CM

## 2023-07-27 DIAGNOSIS — T45.4X5A ADVERSE EFFECT OF IRON, INITIAL ENCOUNTER: Chronic | ICD-10-CM

## 2023-07-27 DIAGNOSIS — Z45.2 ENCOUNTER FOR VENOUS ACCESS DEVICE CARE: ICD-10-CM

## 2023-07-27 DIAGNOSIS — E83.42 HYPOMAGNESEMIA: ICD-10-CM

## 2023-07-27 DIAGNOSIS — E83.42 HYPOMAGNESEMIA: Primary | ICD-10-CM

## 2023-07-27 DIAGNOSIS — C34.91 NON-SMALL CELL CANCER OF RIGHT LUNG: Primary | Chronic | ICD-10-CM

## 2023-07-27 DIAGNOSIS — C79.51 METASTASIS TO BONE: Chronic | ICD-10-CM

## 2023-07-27 DIAGNOSIS — E87.6 HYPOKALEMIA: ICD-10-CM

## 2023-07-27 DIAGNOSIS — C34.91 NON-SMALL CELL CANCER OF RIGHT LUNG: ICD-10-CM

## 2023-07-27 LAB
ALBUMIN SERPL-MCNC: 2.1 G/DL (ref 3.5–5.2)
ALBUMIN/GLOB SERPL: 0.4 G/DL
ALP SERPL-CCNC: 94 U/L (ref 39–117)
ALT SERPL W P-5'-P-CCNC: 5 U/L (ref 1–33)
ANION GAP SERPL CALCULATED.3IONS-SCNC: 10 MMOL/L (ref 5–15)
ANISOCYTOSIS BLD QL: NORMAL
AST SERPL-CCNC: 8 U/L (ref 1–32)
BASOPHILS # BLD AUTO: 0.07 10*3/MM3 (ref 0–0.2)
BASOPHILS NFR BLD AUTO: 0.5 % (ref 0–1.5)
BILIRUB SERPL-MCNC: 0.2 MG/DL (ref 0–1.2)
BUN SERPL-MCNC: 8 MG/DL (ref 6–20)
BUN/CREAT SERPL: 16.3 (ref 7–25)
CALCIUM SPEC-SCNC: 9.1 MG/DL (ref 8.6–10.5)
CHLORIDE SERPL-SCNC: 93 MMOL/L (ref 98–107)
CO2 SERPL-SCNC: 26 MMOL/L (ref 22–29)
CREAT SERPL-MCNC: 0.49 MG/DL (ref 0.57–1)
DEPRECATED RDW RBC AUTO: 57.5 FL (ref 37–54)
EGFRCR SERPLBLD CKD-EPI 2021: 109.4 ML/MIN/1.73
EOSINOPHIL # BLD AUTO: 0.01 10*3/MM3 (ref 0–0.4)
EOSINOPHIL NFR BLD AUTO: 0.1 % (ref 0.3–6.2)
ERYTHROCYTE [DISTWIDTH] IN BLOOD BY AUTOMATED COUNT: 22.5 % (ref 12.3–15.4)
GLOBULIN UR ELPH-MCNC: 5.1 GM/DL
GLUCOSE SERPL-MCNC: 216 MG/DL (ref 65–99)
HCT VFR BLD AUTO: 27.4 % (ref 34–46.6)
HGB BLD-MCNC: 8.3 G/DL (ref 12–15.9)
HYPOCHROMIA BLD QL: NORMAL
IMM GRANULOCYTES # BLD AUTO: 0.11 10*3/MM3 (ref 0–0.05)
IMM GRANULOCYTES NFR BLD AUTO: 0.7 % (ref 0–0.5)
LYMPHOCYTES # BLD AUTO: 1.77 10*3/MM3 (ref 0.7–3.1)
LYMPHOCYTES NFR BLD AUTO: 11.5 % (ref 19.6–45.3)
MAGNESIUM SERPL-MCNC: 1.4 MG/DL (ref 1.6–2.6)
MCH RBC QN AUTO: 22.5 PG (ref 26.6–33)
MCHC RBC AUTO-ENTMCNC: 30.3 G/DL (ref 31.5–35.7)
MCV RBC AUTO: 74.3 FL (ref 79–97)
MONOCYTES # BLD AUTO: 1.34 10*3/MM3 (ref 0.1–0.9)
MONOCYTES NFR BLD AUTO: 8.7 % (ref 5–12)
NEUTROPHILS NFR BLD AUTO: 12.08 10*3/MM3 (ref 1.7–7)
NEUTROPHILS NFR BLD AUTO: 78.5 % (ref 42.7–76)
NRBC BLD AUTO-RTO: 0.3 /100 WBC (ref 0–0.2)
PHOSPHATE SERPL-MCNC: 3.1 MG/DL (ref 2.5–4.5)
PLATELET # BLD AUTO: 756 10*3/MM3 (ref 140–450)
PMV BLD AUTO: 8.3 FL (ref 6–12)
POIKILOCYTOSIS BLD QL SMEAR: NORMAL
POTASSIUM SERPL-SCNC: 4 MMOL/L (ref 3.5–5.2)
PROT SERPL-MCNC: 7.2 G/DL (ref 6–8.5)
RBC # BLD AUTO: 3.69 10*6/MM3 (ref 3.77–5.28)
SMALL PLATELETS BLD QL SMEAR: NORMAL
SODIUM SERPL-SCNC: 129 MMOL/L (ref 136–145)
T4 FREE SERPL-MCNC: 1.03 NG/DL (ref 0.93–1.7)
TSH SERPL DL<=0.05 MIU/L-ACNC: 1.25 UIU/ML (ref 0.27–4.2)
WBC MORPH BLD: NORMAL
WBC NRBC COR # BLD: 15.38 10*3/MM3 (ref 3.4–10.8)

## 2023-07-27 PROCEDURE — 84443 ASSAY THYROID STIM HORMONE: CPT

## 2023-07-27 PROCEDURE — 25010000002 ZOLEDRONIC ACID PER 1 MG: Performed by: INTERNAL MEDICINE

## 2023-07-27 PROCEDURE — 25010000002 HEPARIN LOCK FLUSH PER 10 UNITS: Performed by: INTERNAL MEDICINE

## 2023-07-27 PROCEDURE — 85025 COMPLETE CBC W/AUTO DIFF WBC: CPT

## 2023-07-27 PROCEDURE — 80053 COMPREHEN METABOLIC PANEL: CPT

## 2023-07-27 PROCEDURE — 25010000002 MAGNESIUM SULFATE 2 GM/50ML SOLUTION: Performed by: INTERNAL MEDICINE

## 2023-07-27 PROCEDURE — 25010000002 PEMBROLIZUMAB 100 MG/4ML SOLUTION 4 ML VIAL: Performed by: INTERNAL MEDICINE

## 2023-07-27 PROCEDURE — 85007 BL SMEAR W/DIFF WBC COUNT: CPT

## 2023-07-27 PROCEDURE — 83735 ASSAY OF MAGNESIUM: CPT

## 2023-07-27 PROCEDURE — 84100 ASSAY OF PHOSPHORUS: CPT

## 2023-07-27 PROCEDURE — 84439 ASSAY OF FREE THYROXINE: CPT

## 2023-07-27 RX ORDER — MAGNESIUM OXIDE 400 MG/1
400 TABLET ORAL DAILY
Qty: 30 TABLET | Refills: 1 | Status: SHIPPED | OUTPATIENT
Start: 2023-07-27

## 2023-07-27 RX ORDER — SODIUM CHLORIDE 9 MG/ML
250 INJECTION, SOLUTION INTRAVENOUS ONCE
Status: DISCONTINUED | OUTPATIENT
Start: 2023-07-27 | End: 2023-07-27 | Stop reason: HOSPADM

## 2023-07-27 RX ORDER — HEPARIN SODIUM (PORCINE) LOCK FLUSH IV SOLN 100 UNIT/ML 100 UNIT/ML
500 SOLUTION INTRAVENOUS AS NEEDED
Status: DISCONTINUED | OUTPATIENT
Start: 2023-07-27 | End: 2023-07-27 | Stop reason: HOSPADM

## 2023-07-27 RX ORDER — MAGNESIUM SULFATE HEPTAHYDRATE 40 MG/ML
2 INJECTION, SOLUTION INTRAVENOUS ONCE
Status: CANCELLED
Start: 2023-07-27 | End: 2023-07-27

## 2023-07-27 RX ORDER — HEPARIN SODIUM (PORCINE) LOCK FLUSH IV SOLN 100 UNIT/ML 100 UNIT/ML
500 SOLUTION INTRAVENOUS AS NEEDED
OUTPATIENT
Start: 2023-07-27

## 2023-07-27 RX ORDER — SODIUM CHLORIDE 0.9 % (FLUSH) 0.9 %
10 SYRINGE (ML) INJECTION AS NEEDED
OUTPATIENT
Start: 2023-07-27

## 2023-07-27 RX ORDER — MAGNESIUM SULFATE HEPTAHYDRATE 40 MG/ML
2 INJECTION, SOLUTION INTRAVENOUS ONCE
Status: COMPLETED | OUTPATIENT
Start: 2023-07-27 | End: 2023-07-27

## 2023-07-27 RX ORDER — SODIUM CHLORIDE 9 MG/ML
250 INJECTION, SOLUTION INTRAVENOUS ONCE
Status: COMPLETED | OUTPATIENT
Start: 2023-07-27 | End: 2023-07-27

## 2023-07-27 RX ORDER — SODIUM CHLORIDE 9 MG/ML
250 INJECTION, SOLUTION INTRAVENOUS ONCE
Status: CANCELLED | OUTPATIENT
Start: 2023-07-27

## 2023-07-27 RX ORDER — SODIUM CHLORIDE 0.9 % (FLUSH) 0.9 %
10 SYRINGE (ML) INJECTION AS NEEDED
Status: DISCONTINUED | OUTPATIENT
Start: 2023-07-27 | End: 2023-07-27 | Stop reason: HOSPADM

## 2023-07-27 RX ADMIN — ZOLEDRONIC ACID 4 MG: 4 INJECTION, SOLUTION, CONCENTRATE INTRAVENOUS at 11:12

## 2023-07-27 RX ADMIN — MAGNESIUM SULFATE HEPTAHYDRATE 2 G: 40 INJECTION, SOLUTION INTRAVENOUS at 09:43

## 2023-07-27 RX ADMIN — SODIUM CHLORIDE 250 ML: 9 INJECTION, SOLUTION INTRAVENOUS at 09:43

## 2023-07-27 RX ADMIN — SODIUM CHLORIDE 200 MG: 9 INJECTION, SOLUTION INTRAVENOUS at 11:43

## 2023-07-27 RX ADMIN — HEPARIN 500 UNITS: 100 SYRINGE at 12:23

## 2023-07-27 NOTE — PROGRESS NOTES
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  Patient was started on cycle 1 of Keytruda on Fany 15, 2023.  Patient is here to get cycle 3 of Keytruda today.  Patient is also scheduled to start Zometa today.  Complains of back pain and hip pain.  Complains of fatigue.  Continues to have intermittent swelling of lower extremity.  Denies any bleeding.  Denies any fevers.              Past Medical History, Past Surgical History, Social History, Family History have been reviewed and are without significant changes except as mentioned.    Review of Systems   A comprehensive 14 point review of systems was performed and was negative except as mentioned in HPI.    Medications:  The current medication list was reviewed in the EMR    ALLERGIES:    Allergies   Allergen Reactions    Morphine And Related Nausea And Vomiting       Objective      Vitals:    07/27/23 0857   BP: 103/55   Pulse: 90   Temp: 97.4 °F (36.3 °C)   TempSrc: Temporal   SpO2: 92%   Weight: 51.2 kg (112 lb 12.8 oz)   Height: 152.4 cm (60\")   PainSc:   8   PainLoc: Back  Comment: back,both legs         7/27/2023    10:28 AM   Current Status   ECOG score 0       Physical Exam  Cardiovascular:      Comments: Port-A-Cath present  Pulmonary:      Comments: Diminished air entry in right lower lobe lung field.  No rhonchi or wheezing  Neurological:      Mental Status: She is " alert and oriented to person, place, and time.         RECENT LABS:  Glucose   Date Value Ref Range Status   07/27/2023 216 (H) 65 - 99 mg/dL Final     Sodium   Date Value Ref Range Status   07/27/2023 129 (L) 136 - 145 mmol/L Final   04/11/2023 136 136 - 145 mmol/L Final     Potassium   Date Value Ref Range Status   07/27/2023 4.0 3.5 - 5.2 mmol/L Final   04/11/2023 3.1 (L) 3.5 - 5.1 mmol/L Final     CO2   Date Value Ref Range Status   07/27/2023 26.0 22.0 - 29.0 mmol/L Final     Chloride   Date Value Ref Range Status   07/27/2023 93 (L) 98 - 107 mmol/L Final   04/11/2023 98 98 - 107 mmol/L Final     Anion Gap   Date Value Ref Range Status   07/27/2023 10.0 5.0 - 15.0 mmol/L Final     Creatinine   Date Value Ref Range Status   07/27/2023 0.49 (L) 0.57 - 1.00 mg/dL Final   04/11/2023 0.7 0.6 - 1.0 mg/dL Final     BUN   Date Value Ref Range Status   07/27/2023 8 6 - 20 mg/dL Final   04/11/2023 6 (L) 7 - 18 mg/dL Final     BUN/Creatinine Ratio   Date Value Ref Range Status   07/27/2023 16.3 7.0 - 25.0 Final     Calcium   Date Value Ref Range Status   07/27/2023 9.1 8.6 - 10.5 mg/dL Final   04/11/2023 9.2 8.5 - 10.1 mg/dL Final     eGFR Non  Amer   Date Value Ref Range Status   07/01/2021 80 51 - 120 mL/min/1.73 Final     Alkaline Phosphatase   Date Value Ref Range Status   07/27/2023 94 39 - 117 U/L Final   04/11/2023 97 46 - 116 U/L Final     Total Protein   Date Value Ref Range Status   07/27/2023 7.2 6.0 - 8.5 g/dL Final   04/11/2023 7.6 6.4 - 8.2 g/dL Final     ALT (SGPT)   Date Value Ref Range Status   07/27/2023 5 1 - 33 U/L Final   04/11/2023 11 (L) 14 - 59 U/L Final     AST (SGOT)   Date Value Ref Range Status   07/27/2023 8 1 - 32 U/L Final   04/11/2023 10 (L) 15 - 37 U/L Final     Total Bilirubin   Date Value Ref Range Status   07/27/2023 0.2 0.0 - 1.2 mg/dL Final   04/11/2023 0.20 0.20 - 1.00 mg/dL Final     Albumin   Date Value Ref Range Status   07/27/2023 2.1 (L) 3.5 - 5.2 g/dL Final   04/11/2023  1.9 (L) 3.4 - 5.0 g/dL Final     Globulin   Date Value Ref Range Status   07/27/2023 5.1 gm/dL Final     Lab Results   Component Value Date    WBC 15.38 (H) 07/27/2023    HGB 8.3 (L) 07/27/2023    HCT 27.4 (L) 07/27/2023    MCV 74.3 (L) 07/27/2023     (H) 07/27/2023     Lab Results   Component Value Date    NEUTROABS 12.08 (H) 07/27/2023    IRON 11 (L) 07/06/2023    IRON 9 (L) 04/19/2023    TIBC 109 (L) 07/06/2023    TIBC 194 (L) 04/19/2023    LABIRON 10 (L) 07/06/2023    LABIRON 5 (L) 04/19/2023    FERRITIN 694.00 (H) 07/06/2023    FERRITIN 386.30 (H) 04/19/2023    [id number] 334 07/06/2023    RGHLPDIC78 327 03/28/2023    IXTLBKPI05 210 (L) 08/02/2022    FOLATE 8.51 07/06/2023    FOLATE >20.00 04/19/2023    FOLATE 5.27 08/02/2022           PATHOLOGY:  * Cannot find OR log *         RADIOLOGY DATA :  No radiology results for the last 7 days        Assessment & Plan     1.  Poorly differentiated non-small cell cancer of right lung with liver, bone metastasis stage IV  - PD-L1 is 70% D1 mutation burden is like MSI stable, ALK, BRAF, EGFR, NTRK, RET mutation negative  - Patient was started on cycle 1 of Keytruda on Fany 15, 2023  - We will proceed with cycle 3 of Keytruda today.  - We will continue with close monitoring for immune mediated side effect.  - We will repeat CT of chest, abdomen and pelvis with contrast after cycle 4 of Keytruda.  - Recommendations were discussed with patient and family    2.  Anemia:  - Secondary to blood loss plus anemia of chronic disease plus iron deficiency  - Due to inability to tolerate iron by mouth patient received Venofer in June 2023  - Remains on B12 and folic acid p.o. daily  - Hemoglobin today is 8.3.  - We will repeat anemia work-up in September 2023    3.  Leukocytosis and thrombocytosis:  - Secondary to splenectomy plus malignancy  - White blood cell count is 15,000, platelet count is 756,000  - We will monitor with CBC    4.  Bone metastasis  - Patient will be  started on cycle 1 of Zometa today.  -We will continue with Zometa every 28 days.    5.  Hypomagnesemia  - Magnesium level is 1.4.  Will provide patient with magnesium sulfate 2 g IV x1 here today.  We will also send prescription for magnesium oxide 400 mg p.o. daily to patient's pharmacy.  Patient was notified about magnesium prescription    6.  Hypertension    7.  Diabetes mellitus    8.  Health maintenance: Patient patient has been counseled about smoking cessation                       PHQ-9 Total Score: 0   -Patient is not homicidal or suicidal.  No acute intervention required.     Isabel Villalba reports a pain score of 8.  Given her pain assessment as noted, treatment options were discussed and the following options were decided upon as a follow-up plan to address the patient's pain: continuation of current treatment plan for pain.         Brett Duckworth MD  7/27/2023  17:41 CDT        Part of this note may be an electronic transcription/translation of spoken language to printed text using the Dragon Dictation System.          CC:

## 2023-08-08 ENCOUNTER — OFFICE VISIT (OUTPATIENT)
Dept: FAMILY MEDICINE CLINIC | Facility: CLINIC | Age: 58
End: 2023-08-08
Payer: MEDICARE

## 2023-08-08 ENCOUNTER — PRIOR AUTHORIZATION (OUTPATIENT)
Dept: FAMILY MEDICINE CLINIC | Facility: CLINIC | Age: 58
End: 2023-08-08

## 2023-08-08 VITALS — BODY MASS INDEX: 22.03 KG/M2 | HEIGHT: 60 IN

## 2023-08-08 DIAGNOSIS — M51.27 LUMBAGO-SCIATICA DUE TO DISPLACEMENT OF LUMBAR INTERVERTEBRAL DISC: ICD-10-CM

## 2023-08-08 DIAGNOSIS — G89.3 CANCER RELATED PAIN: ICD-10-CM

## 2023-08-08 DIAGNOSIS — C79.51 METASTASIS TO BONE: ICD-10-CM

## 2023-08-08 DIAGNOSIS — C34.91 NON-SMALL CELL CANCER OF RIGHT LUNG: Primary | ICD-10-CM

## 2023-08-08 PROCEDURE — 3044F HG A1C LEVEL LT 7.0%: CPT | Performed by: FAMILY MEDICINE

## 2023-08-08 PROCEDURE — 1159F MED LIST DOCD IN RCRD: CPT | Performed by: FAMILY MEDICINE

## 2023-08-08 PROCEDURE — 1160F RVW MEDS BY RX/DR IN RCRD: CPT | Performed by: FAMILY MEDICINE

## 2023-08-08 PROCEDURE — 99214 OFFICE O/P EST MOD 30 MIN: CPT | Performed by: FAMILY MEDICINE

## 2023-08-08 RX ORDER — OXYCODONE 27 MG/1
1 CAPSULE, EXTENDED RELEASE ORAL EVERY 12 HOURS
Qty: 60 EACH | Refills: 0 | Status: SHIPPED | OUTPATIENT
Start: 2023-08-08

## 2023-08-08 RX ORDER — OXYCODONE HYDROCHLORIDE 30 MG/1
30 TABLET, FILM COATED, EXTENDED RELEASE ORAL EVERY 12 HOURS SCHEDULED
Qty: 60 TABLET | Refills: 0 | Status: SHIPPED | OUTPATIENT
Start: 2023-08-08

## 2023-08-08 RX ORDER — ONDANSETRON HYDROCHLORIDE 8 MG/1
8 TABLET, FILM COATED ORAL 3 TIMES DAILY PRN
Qty: 90 TABLET | Refills: 5 | Status: SHIPPED | OUTPATIENT
Start: 2023-08-08

## 2023-08-08 RX ORDER — TIZANIDINE 4 MG/1
4 TABLET ORAL EVERY 8 HOURS PRN
Qty: 270 TABLET | Refills: 5 | Status: SHIPPED | OUTPATIENT
Start: 2023-08-08

## 2023-08-08 NOTE — PROGRESS NOTES
"Subjective   Isabelkrystle Villalba is a 57 y.o. female.   With non-small cell cancer of the right lung with metastases and significant progression seen on recent CT here today with increasing pain.  She is known to have the bony mets is having a lot of pain in the spine area.  She is maintaining her weight although her albumin recently was low and she is having swelling of the ankles.  She says she feels like her appetite is \"okay\" but she usually only eats about 1 meal per day.    Currently on Keytruda, followed by Dr. Duckworth and heme-onc.    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    Her current pain medication is not adequately controlling her pain.    History of Present Illness    The following portions of the patient's history were reviewed and updated as appropriate: allergies, current medications, past family history, past medical history, past social history, past surgical history and problem list.    Review of Systems   Constitutional:  Positive for activity change, appetite change and unexpected weight change.   HENT:  Negative for postnasal drip, rhinorrhea and sinus pressure.    Respiratory:  Negative for choking and shortness of breath.    Gastrointestinal: Negative.    Musculoskeletal:  Positive for arthralgias and gait problem.   Skin: Negative.    Allergic/Immunologic: Negative for immunocompromised state.   Neurological:  Negative for tremors, seizures and syncope.   Hematological: Negative.    Psychiatric/Behavioral:  Negative for agitation, decreased concentration, dysphoric mood, sleep disturbance and suicidal ideas. The patient is not nervous/anxious.    All other systems reviewed and are negative.    Objective    Body mass index is 21.87 kg/mý (pended).  Vitals:    08/08/23 1321   BP: (P) 118/66   Pulse: (P) 97   Resp: (P) 18   SpO2: (P) 100%   Weight: (P) 50.8 kg (112 lb)   Height: 152.4 cm (60\") "       Physical Exam  Vitals and nursing note reviewed.   Constitutional:       Appearance: She is well-developed. She is ill-appearing.      Comments: Appears thin, frail.  Sitting in wheelchair wrapped in a blanket.   HENT:      Head: Normocephalic and atraumatic.      Nose: Nose normal.   Eyes:      Conjunctiva/sclera: Conjunctivae normal.      Pupils: Pupils are equal, round, and reactive to light.   Neck:      Thyroid: No thyromegaly.      Vascular: No JVD.      Trachea: No tracheal deviation.   Cardiovascular:      Rate and Rhythm: Normal rate and regular rhythm.      Heart sounds: Normal heart sounds. No murmur heard.  Pulmonary:      Effort: Pulmonary effort is normal.      Breath sounds: Wheezing present.   Abdominal:      General: Bowel sounds are normal. There is no distension.      Palpations: Abdomen is soft.      Tenderness: There is no abdominal tenderness.   Musculoskeletal:         General: Normal range of motion.      Cervical back: Normal range of motion and neck supple.   Lymphadenopathy:      Cervical: No cervical adenopathy.   Skin:     General: Skin is warm and dry.      Findings: No rash.   Neurological:      Mental Status: She is alert and oriented to person, place, and time.      Motor: Weakness present.      Coordination: Coordination normal.      Gait: Gait abnormal.   Psychiatric:         Mood and Affect: Mood normal.         Behavior: Behavior normal.         Thought Content: Thought content normal.         Judgment: Judgment normal.       Assessment & Plan   Diagnoses and all orders for this visit:    1. Non-small cell cancer of right lung (Primary)  -     ondansetron (ZOFRAN) 8 MG tablet; Take 1 tablet by mouth 3 (Three) Times a Day As Needed for Nausea or Vomiting.  Dispense: 90 tablet; Refill: 5    2. Cancer related pain  -     oxyCODONE HCl ER (oxyCONTIN) 30 MG tablet extended-release 12 hour; Take 1 tablet by mouth Every 12 (Twelve) Hours.  Dispense: 60 tablet; Refill: 0  -      oxyCODONE ER (Xtampza ER) 27 MG capsule extended-release 12 hour ; Take 1 capsule by mouth Every 12 (Twelve) Hours.  Dispense: 60 each; Refill: 0    3. Lumbago-sciatica due to displacement of lumbar intervertebral disc  -     tiZANidine (ZANAFLEX) 4 MG tablet; Take 1 tablet by mouth Every 8 (Eight) Hours As Needed for Muscle Spasms.  Dispense: 270 tablet; Refill: 5    4. Metastasis to bone  -     ondansetron (ZOFRAN) 8 MG tablet; Take 1 tablet by mouth 3 (Three) Times a Day As Needed for Nausea or Vomiting.  Dispense: 90 tablet; Refill: 5    Continue on Keytruda for lung cancer follow-up with heme-onc as scheduled.  Zofran is refilled to take for chemo related nausea.    We will start her on extended release medication.  The insurance evidently will pay for the Xtampza ER.  She does have the 15 mg oxycodone to take in between but have advised to take only if needed for breakthrough pain.  If this is not adequate let me know and we will increase the dose of the extended release oxycodone.    Refilled tizanidine for her back pain.    I spent 30 minutes caring for Isabel on this date of service. This time includes time spent by me in the following activities:preparing for the visit, obtaining and/or reviewing a separately obtained history, performing a medically appropriate examination and/or evaluation , counseling and educating the patient/family/caregiver, ordering medications, tests, or procedures and documenting information in the medical record          This document has been electronically signed by Radha Simental MD on August 8, 2023 13:40 CDT

## 2023-08-08 NOTE — TELEPHONE ENCOUNTER
PA for Oxycontin was submitted to covermeds ref PA Key [id number]. DX used G89.3 cancer related pain. Urgent PA was requested.    Preferred agent is Xtamp[employer].    PA was Denied. Xtampza was sent in for the patient.

## 2023-08-11 DIAGNOSIS — R05.9 COUGH: ICD-10-CM

## 2023-08-11 RX ORDER — BENZONATATE 200 MG/1
200 CAPSULE ORAL 3 TIMES DAILY PRN
Qty: 30 CAPSULE | Refills: 2 | Status: SHIPPED | OUTPATIENT
Start: 2023-08-11

## 2023-08-14 DIAGNOSIS — G89.3 CANCER RELATED PAIN: ICD-10-CM

## 2023-08-14 RX ORDER — OXYCODONE HYDROCHLORIDE 15 MG/1
TABLET ORAL
Qty: 120 TABLET | Refills: 0 | OUTPATIENT
Start: 2023-08-14

## 2023-08-15 DIAGNOSIS — G89.3 CANCER RELATED PAIN: ICD-10-CM

## 2023-08-15 RX ORDER — OXYCODONE HYDROCHLORIDE 15 MG/1
15 TABLET ORAL EVERY 6 HOURS PRN
Qty: 120 TABLET | Refills: 0 | Status: SHIPPED | OUTPATIENT
Start: 2023-08-15 | End: 2023-08-16 | Stop reason: SDUPTHER

## 2023-08-16 ENCOUNTER — TELEPHONE (OUTPATIENT)
Dept: FAMILY MEDICINE CLINIC | Facility: CLINIC | Age: 58
End: 2023-08-16
Payer: MEDICARE

## 2023-08-16 DIAGNOSIS — G89.3 CANCER RELATED PAIN: ICD-10-CM

## 2023-08-16 PROBLEM — F17.218 NICOTINE DEPENDENCE, CIGARETTES, WITH OTHER NICOTINE-INDUCED DISORDERS: Status: ACTIVE | Noted: 2023-08-16

## 2023-08-16 RX ORDER — OXYCODONE HYDROCHLORIDE 15 MG/1
15 TABLET ORAL EVERY 4 HOURS PRN
Qty: 150 TABLET | Refills: 0 | Status: SHIPPED | OUTPATIENT
Start: 2023-08-16

## 2023-08-16 NOTE — TELEPHONE ENCOUNTER
Radha Simental MD  You2 hours ago (1:01 PM)      Yes tell her that I sent to them where she can take 5 a day but  Before she fills the long-acting oxycodone we may want to go up on the dose of that so she does not have as much breakthrough pain.  Tell her to let me know before she feels it and we will increase it.

## 2023-08-16 NOTE — TELEPHONE ENCOUNTER
Asking if her oxycodone 15mg can be increased for the next refill?  She asked if the MG could be increased or if she could take 5 a day

## 2023-08-16 NOTE — PROGRESS NOTES
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  Patient is currently on treatment with Keytruda since Fany 15, 2023.  Patient is here to get cycle 4 of Keytruda today.  Patient has been started on Zometa for bone metastasis.  Complains of fatigue.  Complains of chronic back and leg pain.  Denies any worsening shortness of breath.  Denies any excessive nausea or vomiting or diarrhea.  Denies any fevers .      Past Medical History, Past Surgical History, Social History, Family History have been reviewed and are without significant changes except as mentioned.    Review of Systems   A comprehensive 14 point review of systems was performed and was negative except as mentioned in HPI.    Medications:  The current medication list was reviewed in the EMR    ALLERGIES:    Allergies   Allergen Reactions    Morphine And Related Nausea And Vomiting       Objective      Vitals:    08/17/23 0908   BP: 119/57   Pulse: 100   SpO2: (!) 83%   Weight: 51.8 kg (114 lb 3.2 oz)   Height: 152.4 cm (60\")   PainSc:   8   PainLoc: Back  Comment: back and legs         7/27/2023    10:28 AM   Current Status   ECOG score 0       Physical Exam  Cardiovascular:      Comments: Port-A-Cath present  Pulmonary:      Comments: Decreased air entry on right lower lobe lung field.  No rhonchi or wheezing.  Neurological:      Mental Status: She is alert and oriented " to person, place, and time.         RECENT LABS:  Glucose   Date Value Ref Range Status   08/17/2023 145 (H) 65 - 99 mg/dL Final     Sodium   Date Value Ref Range Status   08/17/2023 128 (L) 136 - 145 mmol/L Final   04/11/2023 136 136 - 145 mmol/L Final     Potassium   Date Value Ref Range Status   08/17/2023 3.7 3.5 - 5.2 mmol/L Final   04/11/2023 3.1 (L) 3.5 - 5.1 mmol/L Final     CO2   Date Value Ref Range Status   08/17/2023 25.0 22.0 - 29.0 mmol/L Final     Chloride   Date Value Ref Range Status   08/17/2023 95 (L) 98 - 107 mmol/L Final   04/11/2023 98 98 - 107 mmol/L Final     Anion Gap   Date Value Ref Range Status   08/17/2023 8.0 5.0 - 15.0 mmol/L Final     Creatinine   Date Value Ref Range Status   08/17/2023 0.42 (L) 0.57 - 1.00 mg/dL Final   04/11/2023 0.7 0.6 - 1.0 mg/dL Final     BUN   Date Value Ref Range Status   08/17/2023 6 6 - 20 mg/dL Final   04/11/2023 6 (L) 7 - 18 mg/dL Final     BUN/Creatinine Ratio   Date Value Ref Range Status   08/17/2023 14.3 7.0 - 25.0 Final     Calcium   Date Value Ref Range Status   08/17/2023 8.0 (L) 8.6 - 10.5 mg/dL Final   04/11/2023 9.2 8.5 - 10.1 mg/dL Final     eGFR Non  Amer   Date Value Ref Range Status   07/01/2021 80 51 - 120 mL/min/1.73 Final     Alkaline Phosphatase   Date Value Ref Range Status   08/17/2023 129 (H) 39 - 117 U/L Final   04/11/2023 97 46 - 116 U/L Final     Total Protein   Date Value Ref Range Status   08/17/2023 7.0 6.0 - 8.5 g/dL Final   04/11/2023 7.6 6.4 - 8.2 g/dL Final     ALT (SGPT)   Date Value Ref Range Status   08/17/2023 <5 1 - 33 U/L Final   04/11/2023 11 (L) 14 - 59 U/L Final     AST (SGOT)   Date Value Ref Range Status   08/17/2023 7 1 - 32 U/L Final   04/11/2023 10 (L) 15 - 37 U/L Final     Total Bilirubin   Date Value Ref Range Status   08/17/2023 0.2 0.0 - 1.2 mg/dL Final   04/11/2023 0.20 0.20 - 1.00 mg/dL Final     Albumin   Date Value Ref Range Status   08/17/2023 2.2 (L) 3.5 - 5.2 g/dL Final   04/11/2023 1.9 (L)  3.4 - 5.0 g/dL Final     Globulin   Date Value Ref Range Status   08/17/2023 4.8 gm/dL Final     Lab Results   Component Value Date    WBC 15.80 (H) 08/17/2023    HGB 8.4 (L) 08/17/2023    HCT 26.8 (L) 08/17/2023    MCV 72.0 (L) 08/17/2023     (H) 08/17/2023     Lab Results   Component Value Date    NEUTROABS 11.71 (H) 08/17/2023    IRON 11 (L) 07/06/2023    IRON 9 (L) 04/19/2023    TIBC 109 (L) 07/06/2023    TIBC 194 (L) 04/19/2023    LABIRON 10 (L) 07/06/2023    LABIRON 5 (L) 04/19/2023    FERRITIN 694.00 (H) 07/06/2023    FERRITIN 386.30 (H) 04/19/2023    RNBRJZUO65 334 07/06/2023    VXELYYLI04 327 03/28/2023    LUYICQBS66 210 (L) 08/02/2022    FOLATE 8.51 07/06/2023    FOLATE >20.00 04/19/2023    FOLATE 5.27 08/02/2022           PATHOLOGY:  * Cannot find OR log *         RADIOLOGY DATA :  No radiology results for the last 7 days        Assessment & Plan     1.  Poorly differentiated non-small cell cancer of right lung with liver, bone metastasis stage IV  - PD-L1 was 70%, tumor mutation burden is high, MSI stable, BRAF, ALK, EGFR, NTRK, RET mutation negative  - Patient was started on cycle 1 of Keytruda on Fany 15, 2023  - Since patient had persistent hypoxia with oxygen saturation staying between 82 and 84% patient was sent for CT angiogram for evaluation to rule out any developing pulmonary embolism from her malignancy.  - Patient was seen by back in clinic after CT angiogram results and at that point patient states she is feeling very exhausted and does not want to take Keytruda treatment today.  - We will reschedule patient next week for cycle 4 of Keytruda  - CT angiogram done earlier today shows stable right lung mass but left lung nodule has minimally increased in size.  Results were discussed with patient.  It was also discussed with patient we will repeat CT of chest abdomen and pelvis prior to cycle 6 of treatment with Keytruda if there is no improvement in lung nodule by that time we may have  to add chemotherapy.    - We will continue with close monitoring for immune mediated side effect.  - We will have patient return to clinic in 3 weeks with repeat CBC, CMP, iron studies, ferritin, B12 and folate to be done on that day.  - We will get restaging CT of chest, abdomen and pelvis with contrast prior to cycle 6 of Keytruda    2.  Anemia:  - Secondary to blood loss plus or minus anemia of chronic disease  - Due to inability to tolerate iron by mouth patient has received Venofer in June 2023  - Remains on B12 and folic acid p.o. daily  - Hemoglobin today is 8.4  - We will repeat anemia work-up upon next clinic visit in 3 weeks    3.  Leukocytosis and thrombocytosis  - Secondary to splenectomy plus malignancy.  - White blood cell count is 15.8, platelet count is 756,000  - We will monitor with CBC    4.  Bone metastasis  - Patient has been started on Zometa in June 2023  - We will continue with monthly Zometa every 28 days.    5.  Hypomagnesemia  - Magnesium level is 1.6    6.  Hypertension    7.  Diabetes mellitus    8.  Health maintenance: Patient has been counseled about smoking cessation in past.    9.  Hyponatremia  - Sodium level is 128.  We will refer patient to nephrology team for further evaluation of hyponatremia.  Recommendation has been discussed with patient.  Referral has been placed today    10.  Hypoxia:  - Patient's oxygen saturation is 82%.  Patient denies any chest pain or any swelling of.  Shortness of breath.  - Oxygen saturation was 100% last clinic visit.  - Due to her metastatic cancer and recommend getting CT angiogram done today to rule out any developing pulmonary embolism.  CT angiogram has been ordered.  - Due to persistent hypoxia patient will also benefit from home oxygen.  - CT angiogram done earlier today does not show any evidence of pulmonary embolism which was discussed with patient.  - Her oxygen saturation was staying around 84% at time of clinical discharge as well.  We  will prescribe her home oxygen in view of persistent hypoxia.      About 45 minutes were spent for clinic visit today including initial clinic visit, coordinating to get CT angiogram done right away today, going over results of CTs angiogram as well as reviewing CT scan today and documentation                 PHQ-9 Total Score: 0   -Patient is not homicidal or suicidal.  No acute intervention required.     Isabel Villalba reports a pain score of 8.  Given her pain assessment as noted, treatment options were discussed and the following options were decided upon as a follow-up plan to address the patient's pain: continuation of current treatment plan for pain.         Brett Duckworth MD  8/17/2023  09:30 CDT        Part of this note may be an electronic transcription/translation of spoken language to printed text using the Dragon Dictation System.          CC:

## 2023-08-16 NOTE — PROGRESS NOTES
4/26/2023    Isabel Villalba  1965    Chief Complaint:    Chief Complaint   Patient presents with    Lung Nodule       HPI:      PCP:  Radha Simental MD     57 y.o. female with HTN(stable, increased risk stroke, rupture), Hyperlipidemia(stable, increased risk cardiovascular events), and Smoker(uncontrolled, increased risk cardiovascular events), lung mass(new, chronic severe progression, suspicious malignancy).  smokes 1/2 PPD.  Moderate cough since 3/2023.  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  No TIA stroke amaurosis.  No MI claudication. No other associated signs, symptoms or modifying factors.    3/2023 CT Abdomen Pelvis([facility]):  ruptured spleen, hemoperitoneum.  45mm RIGHT hilar mass.  4/2023 CT Chest:  50mm RIGHT hilar mass.  4/2023 PET CT:  60mm RIGHT hilar mass (SUV 21), level 3 lymph node (SUV 30), RIGHT iliac lesion 20mm (SUV 10), LEFT lobe liver 20mm x2 (SUV 5)  4/2023 PFT:  FVC 2.3 (82%), FEV1 1.5 (68%), DLCO 50%    10/2022 ECG:  NSR 63, QTc 437    The following portions of the patient's history were reviewed and updated as appropriate: allergies, current medications, past family history, past medical history, past social history, past surgical history and problem list.  Recent images independently reviewed.  Available laboratory values reviewed.    PMH:  Past Medical History:   Diagnosis Date    Acute upper respiratory infection     Diabetes mellitus     Disorder of bile duct     biliary ductal ectasia noted on MRI    Epigastric pain     Essential hypertension     Fatigue     Gastritis     Gastroesophageal reflux disease     Gouty arthropathy     left first MTP joint    H/O screening mammography 06/24/2014    Hallux limitus of right foot     Hernia of abdominal wall     History of transfusion     Hyperlipidemia     Irreducible incisional hernia     vs lipoma    Lumbago with sciatica     Lumbago-sciatica due to displacement of lumbar intervertebral disc      Protrusion of cervical intervertebral disc     Broad based intervertebral disc protrusion - C5-6, minimally impressing upon ventral thecal sac by MRI done 4/7/15     Pruritic rash     Systolic murmur     Vitamin B12 deficiency     Weight increase      Past Surgical History:   Procedure Laterality Date    ABDOMINAL SURGERY      BREAST BIOPSY Left     CARPAL TUNNEL RELEASE Bilateral 1995    CHOLECYSTECTOMY  2004    ENDOSCOPY      INJECTION OF MEDICATION  11/17/2015    Bicillin LA 1.2    INJECTION OF MEDICATION  11/17/2015    depo medrol    MEDIASTINOSCOPY, BRONCHOSCOPY N/A 5/3/2023    Procedure: MEDIASTINOSCOPY, BRONCHOSCOPY                      (DONE IN OR WITH ENDO);  Surgeon: Jerman Porter MD;  Location: [facility] OR;  Service: Cardiothoracic;  Laterality: N/A;    SPLENECTOMY  03/07/2023    TUBAL ABDOMINAL LIGATION  2004    VENOUS ACCESS DEVICE (PORT) INSERTION N/A 5/3/2023    Procedure: POSSIBLE PORT PLACEMENT       (C-ARM#2);  Surgeon: Jerman Porter MD;  Location: [facility] OR;  Service: Cardiothoracic;  Laterality: N/A;     Family History   Problem Relation Age of Onset    ADD / ADHD Other     Bipolar disorder Other     Cancer Other     Coronary artery disease Other     Diabetes Other     Heart disease Other     Hyperlipidemia Other     Hypertension Other     Lung cancer Other     Migraines Other     Polycystic ovary syndrome Other     Schizophrenia Other     Stroke Other     Tuberculosis Other     Other Other         respiratory disorder; smoking tobacco    Breast cancer Paternal Grandmother     Hypertension Mother     Cancer Father     Breast cancer Father      Social History     Tobacco Use    Smoking status: Every Day     Packs/day: 0.50     Types: Cigarettes     Passive exposure: Current    Smokeless tobacco: Never   Vaping Use    Vaping Use: Never used   Substance Use Topics    Alcohol use: No    Drug use: No       ALLERGIES:  Allergies   Allergen Reactions    Morphine And Related Nausea And  Vomiting         MEDICATIONS:    Current Outpatient Medications:     albuterol sulfate HFA (Ventolin HFA) 108 (90 Base) MCG/ACT inhaler, Inhale 2 puffs Every 6 (Six) Hours As Needed for Wheezing or Shortness of Air., Disp: 18 g, Rfl: 5    amLODIPine-benazepril (LOTREL 5-20) 5-20 MG per capsule, Take 1 capsule by mouth Daily., Disp: 90 capsule, Rfl: 3    atomoxetine (STRATTERA) 25 MG capsule, TAKE 1 CAPSULE BY MOUTH DAILY., Disp: 90 capsule, Rfl: 5    docusate sodium (COLACE) 100 MG capsule, Take 1 capsule by mouth Every 12 (Twelve) Hours., Disp: 60 capsule, Rfl: 0    ferrous gluconate (FERGON) 324 MG tablet, Take 1 tablet by mouth Daily With Breakfast., Disp: 30 tablet, Rfl: 2    fluticasone (FLONASE) 50 MCG/ACT nasal spray, 2 sprays by Each Nare route Daily., Disp: 16 g, Rfl: 5    glucose blood test strip, Patient is testing 2xs daily, Disp: 100 each, Rfl: 12    glucose monitor monitoring kit, 1 each 2 (two) times a day. Patient is testing 2 xs daily, Disp: 1 each, Rfl: 0    Lancets Misc. misc, Patient is testing 2 xs daily, Disp: 100 each, Rfl: 11    meloxicam (MOBIC) 15 MG tablet, Take 1 tablet by mouth Daily. (Patient taking differently: Take 1 tablet by mouth Daily As Needed.), Disp: 30 tablet, Rfl: 5    Pain Reliever Extra Strength 500 MG tablet, Take 1 tablet by mouth Every 6 (Six) Hours As Needed. for pain, Disp: , Rfl:     Repatha SureClick solution auto-injector SureClick injection, INJECT 1 ML UNDER THE SKIN INTO THE APPROPRIATE AREA AS DIRECTED EVERY 14 (FOURTEEN) DAYS., Disp: 2 mL, Rfl: 5    vitamin D (ERGOCALCIFEROL) 1.25 MG (00886 UT) capsule capsule, Take 1 capsule by mouth 2 (Two) Times a Week., Disp: 8 capsule, Rfl: 5    benzonatate (TESSALON) 200 MG capsule, TAKE 1 CAPSULE BY MOUTH 3 (THREE) TIMES A DAY AS NEEDED FOR COUGH., Disp: 30 capsule, Rfl: 2    ferrous gluconate (FERGON) 324 MG tablet, Take 1 tablet by mouth Daily With Breakfast., Disp: , Rfl:     folic acid (FOLVITE) 1 MG tablet, Take 1  tablet by mouth Daily., Disp: 90 tablet, Rfl: 1    lidocaine (LIDODERM) 5 %, Place 3 patches on the skin as directed by provider Daily. Remove & Discard patch within 12 hours or as directed by MD, Disp: 90 patch, Rfl: 5    magnesium oxide (MAG-OX) 400 MG tablet, Take 1 tablet by mouth Daily., Disp: 30 tablet, Rfl: 1    OLANZapine (zyPREXA) 5 MG tablet, Take 1 tablet by mouth Every Night. Take on days 2, 3 and 4 after chemotherapy., Disp: 3 tablet, Rfl: 3    omeprazole (priLOSEC) 20 MG capsule, Take 1 capsule by mouth Daily., Disp: , Rfl:     ondansetron (ZOFRAN) 8 MG tablet, Take 1 tablet by mouth 3 (Three) Times a Day As Needed for Nausea or Vomiting., Disp: 90 tablet, Rfl: 5    oxyCODONE (Roxicodone) 15 MG immediate release tablet, Take 1 tablet by mouth Every 6 (Six) Hours As Needed for Moderate Pain., Disp: 120 tablet, Rfl: 0    oxyCODONE ER (Xtampza ER) 27 MG capsule extended-release 12 hour , Take 1 capsule by mouth Every 12 (Twelve) Hours., Disp: 60 each, Rfl: 0    oxyCODONE HCl ER (oxyCONTIN) 30 MG tablet extended-release 12 hour, Take 1 tablet by mouth Every 12 (Twelve) Hours., Disp: 60 tablet, Rfl: 0    Ozempic, 0.25 or 0.5 MG/DOSE, 2 MG/1.5ML solution pen-injector, INJECT 0.5 MG UNDER THE SKIN INTO THE APPROPRIATE AREA AS DIRECTED 1 (ONE) TIME PER WEEK., Disp: 1.5 mL, Rfl: 5    pregabalin (LYRICA) 150 MG capsule, Take 1 capsule by mouth 3 (Three) Times a Day., Disp: 90 capsule, Rfl: 0    tiZANidine (ZANAFLEX) 4 MG tablet, Take 1 tablet by mouth Every 8 (Eight) Hours As Needed for Muscle Spasms., Disp: 270 tablet, Rfl: 5    vitamin B-12 (CYANOCOBALAMIN) 1000 MCG tablet, Take 1 tablet by mouth Daily., Disp: 90 tablet, Rfl: 1    Review of Systems   Review of Systems   Constitutional: Positive for malaise/fatigue. Negative for weight loss.   Cardiovascular:  Negative for chest pain, claudication and dyspnea on exertion.   Respiratory:  Positive for cough and shortness of breath.    Skin:  Negative for color  "change and poor wound healing.   Gastrointestinal:  Positive for abdominal pain.   Neurological:  Negative for dizziness, numbness and weakness.     Physical Exam   Vitals:    04/26/23 0856   BP: 130/62   BP Location: Left arm   Pulse: 100   SpO2: 93%   Weight: 53.5 kg (118 lb)   Height: 152.4 cm (60\")     Body surface area is 1.49 meters squared.  Body mass index is 23.05 kg/mý.  Physical Exam  Constitutional:       General: She is not in acute distress.     Appearance: She is not ill-appearing.   HENT:      Right Ear: Hearing normal.      Left Ear: Hearing normal.      Nose: No nasal deformity.      Mouth/Throat:      Dentition: Normal dentition. Does not have dentures.   Cardiovascular:      Rate and Rhythm: Normal rate and regular rhythm.      Pulses:           Carotid pulses are 2+ on the right side and 2+ on the left side.       Radial pulses are 2+ on the right side and 2+ on the left side.        Posterior tibial pulses are 2+ on the right side and 2+ on the left side.      Heart sounds: No murmur heard.  Pulmonary:      Effort: Pulmonary effort is normal.      Breath sounds: Normal breath sounds.   Abdominal:      General: There is no distension.      Palpations: Abdomen is soft. There is no mass.      Tenderness: There is no abdominal tenderness.   Musculoskeletal:         General: No deformity.      Comments: Gait normal.    Skin:     General: Skin is warm and dry.      Coloration: Skin is not pale.      Findings: No erythema.      Comments: No venous staining   Neurological:      Mental Status: She is alert and oriented to person, place, and time.   Psychiatric:         Speech: Speech normal.         Behavior: Behavior is cooperative.         Thought Content: Thought content normal.         Judgment: Judgment normal.       BUN   Date Value Ref Range Status   07/27/2023 8 6 - 20 mg/dL Final   04/11/2023 6 (L) 7 - 18 mg/dL Final     Creatinine   Date Value Ref Range Status   07/27/2023 0.49 (L) 0.57 - 1.00 " mg/dL Final   04/11/2023 0.7 0.6 - 1.0 mg/dL Final     eGFR Non  Amer   Date Value Ref Range Status   07/01/2021 80 51 - 120 mL/min/1.73 Final       ASSESSMENT:  Diagnoses and all orders for this visit:    1. Lung mass (Primary)  -     Case Request; Standing  -     Type & Screen; Future  -     Cancel: sodium chloride 0.9 % infusion  -     Cancel: ceFAZolin in 0.9% normal saline (ANCEF) IVPB solution 2 g  -     Cancel: sodium chloride 0.9 % flush 10 mL  -     Cancel: sodium chloride 0.9 % flush 10 mL  -     Cancel: sodium chloride 0.9 % infusion 40 mL  -     Case Request    2. Mixed hyperlipidemia    3. Essential hypertension    4. Nicotine dependence, cigarettes, with other nicotine-induced disorders    Other orders  -     Provide NPO Instructions to Patient; Future  -     Chlorhexidine Skin Prep; Future  -     Cancel: Obtain informed consent; Standing  -     Cancel: Chlorhexidine Skin Prep; Standing  -     Cancel: Insert Arterial Line; Standing  -     Cancel: Place sequential compression device; Standing  -     Cancel: Insert Peripheral IV; Standing  -     Cancel: Saline Lock & Maintain IV Access; Standing  -     Cancel: Obtain informed consent; Standing      PLAN:  Detail discussion with Isabel Sierra Villalba regarding his situation options and plans. RIGHT hilar mass, suspicious malignancy. Significant mediastinal lymphadenopathy requiring tissue diagnosis. Bronchoscopy, Mediastinoscopy and biopsy are advisable.    Risks including, but not limited to, bleeding, transfusion, infection, pulmonary dysfunction, blood vessel or nerve injury, possible open procedure to repair bleeding or lung injury.  Benefits: Diagnosis leading to possible treatment.  Options: observation, endoscopic ultrasound with biopsy discussed.    Bronchoscopy, mediastinoscopy.  GEN.  SDS.  5/3/2023    Return after above studies complete  Smoking cessation advised and assistance options offered including behavioral counseling ([facility]), Nicotine replacement therapy (patches or gum), pharmacologic therapy (Chantix, Wellbutrin). patient understands that continued use of tobacco products increases risk of heart disease, stroke, cancer; counseling for 3-5min.    Recommended regular physical activity, progressive walking program.  Continue current medications as directed.  Will Obtain relevant old records.  Advance Care Planning   ACP discussion was declined by the patient. Patient does not have an advance directive, declines further assistance.     Thank you for the opportunity to participate in this patient's care.    Copy to primary care provider.

## 2023-08-17 ENCOUNTER — DOCUMENTATION (OUTPATIENT)
Dept: ONCOLOGY | Facility: CLINIC | Age: 58
End: 2023-08-17
Payer: MEDICARE

## 2023-08-17 ENCOUNTER — OFFICE VISIT (OUTPATIENT)
Dept: ONCOLOGY | Facility: CLINIC | Age: 58
End: 2023-08-17
Payer: MEDICARE

## 2023-08-17 ENCOUNTER — HOSPITAL ENCOUNTER (OUTPATIENT)
Dept: CT IMAGING | Facility: HOSPITAL | Age: 58
Discharge: HOME | End: 2023-08-17
Admitting: INTERNAL MEDICINE
Payer: MEDICARE

## 2023-08-17 ENCOUNTER — INFUSION (OUTPATIENT)
Dept: ONCOLOGY | Facility: HOSPITAL | Age: 58
End: 2023-08-17
Payer: MEDICARE

## 2023-08-17 VITALS
DIASTOLIC BLOOD PRESSURE: 57 MMHG | WEIGHT: 114.2 LBS | OXYGEN SATURATION: 82 % | HEART RATE: 100 BPM | SYSTOLIC BLOOD PRESSURE: 119 MMHG | BODY MASS INDEX: 22.42 KG/M2 | HEIGHT: 60 IN

## 2023-08-17 DIAGNOSIS — E53.8 VITAMIN B12 DEFICIENCY: Chronic | ICD-10-CM

## 2023-08-17 DIAGNOSIS — R09.02 HYPOXIA: ICD-10-CM

## 2023-08-17 DIAGNOSIS — C79.51 METASTASIS TO BONE: Chronic | ICD-10-CM

## 2023-08-17 DIAGNOSIS — E83.42 HYPOMAGNESEMIA: ICD-10-CM

## 2023-08-17 DIAGNOSIS — Z45.2 ENCOUNTER FOR VENOUS ACCESS DEVICE CARE: ICD-10-CM

## 2023-08-17 DIAGNOSIS — E87.1 HYPONATREMIA: ICD-10-CM

## 2023-08-17 DIAGNOSIS — E87.6 HYPOKALEMIA: Primary | ICD-10-CM

## 2023-08-17 DIAGNOSIS — Z79.899 OTHER LONG TERM (CURRENT) DRUG THERAPY: ICD-10-CM

## 2023-08-17 DIAGNOSIS — C79.51 METASTASIS TO BONE: ICD-10-CM

## 2023-08-17 DIAGNOSIS — D50.0 IRON DEFICIENCY ANEMIA DUE TO CHRONIC BLOOD LOSS: Chronic | ICD-10-CM

## 2023-08-17 DIAGNOSIS — T45.4X5A ADVERSE EFFECT OF IRON, INITIAL ENCOUNTER: Chronic | ICD-10-CM

## 2023-08-17 DIAGNOSIS — C34.91 NON-SMALL CELL CANCER OF RIGHT LUNG: Primary | Chronic | ICD-10-CM

## 2023-08-17 DIAGNOSIS — C34.91 NON-SMALL CELL CANCER OF RIGHT LUNG: ICD-10-CM

## 2023-08-17 LAB
ALBUMIN SERPL-MCNC: 2.2 G/DL (ref 3.5–5.2)
ALBUMIN/GLOB SERPL: 0.5 G/DL
ALP SERPL-CCNC: 129 U/L (ref 39–117)
ALT SERPL W P-5'-P-CCNC: <5 U/L (ref 1–33)
ANION GAP SERPL CALCULATED.3IONS-SCNC: 8 MMOL/L (ref 5–15)
ANISOCYTOSIS BLD QL: NORMAL
AST SERPL-CCNC: 7 U/L (ref 1–32)
BASOPHILS # BLD AUTO: 0.1 10*3/MM3 (ref 0–0.2)
BASOPHILS NFR BLD AUTO: 0.6 % (ref 0–1.5)
BILIRUB SERPL-MCNC: 0.2 MG/DL (ref 0–1.2)
BUN SERPL-MCNC: 6 MG/DL (ref 6–20)
BUN/CREAT SERPL: 14.3 (ref 7–25)
CALCIUM SPEC-SCNC: 8 MG/DL (ref 8.6–10.5)
CHLORIDE SERPL-SCNC: 95 MMOL/L (ref 98–107)
CO2 SERPL-SCNC: 25 MMOL/L (ref 22–29)
CREAT SERPL-MCNC: 0.42 MG/DL (ref 0.57–1)
DEPRECATED RDW RBC AUTO: 53.2 FL (ref 37–54)
EGFRCR SERPLBLD CKD-EPI 2021: 113.5 ML/MIN/1.73
EOSINOPHIL # BLD AUTO: 0.02 10*3/MM3 (ref 0–0.4)
EOSINOPHIL NFR BLD AUTO: 0.1 % (ref 0.3–6.2)
ERYTHROCYTE [DISTWIDTH] IN BLOOD BY AUTOMATED COUNT: 21 % (ref 12.3–15.4)
GLOBULIN UR ELPH-MCNC: 4.8 GM/DL
GLUCOSE SERPL-MCNC: 145 MG/DL (ref 65–99)
HCT VFR BLD AUTO: 26.8 % (ref 34–46.6)
HGB BLD-MCNC: 8.4 G/DL (ref 12–15.9)
IMM GRANULOCYTES # BLD AUTO: 0.13 10*3/MM3 (ref 0–0.05)
IMM GRANULOCYTES NFR BLD AUTO: 0.8 % (ref 0–0.5)
LYMPHOCYTES # BLD AUTO: 1.98 10*3/MM3 (ref 0.7–3.1)
LYMPHOCYTES NFR BLD AUTO: 12.5 % (ref 19.6–45.3)
MAGNESIUM SERPL-MCNC: 1.6 MG/DL (ref 1.6–2.6)
MCH RBC QN AUTO: 22.6 PG (ref 26.6–33)
MCHC RBC AUTO-ENTMCNC: 31.3 G/DL (ref 31.5–35.7)
MCV RBC AUTO: 72 FL (ref 79–97)
MONOCYTES # BLD AUTO: 1.86 10*3/MM3 (ref 0.1–0.9)
MONOCYTES NFR BLD AUTO: 11.8 % (ref 5–12)
NEUTROPHILS NFR BLD AUTO: 11.71 10*3/MM3 (ref 1.7–7)
NEUTROPHILS NFR BLD AUTO: 74.2 % (ref 42.7–76)
NRBC BLD AUTO-RTO: 0.1 /100 WBC (ref 0–0.2)
OVALOCYTES BLD QL SMEAR: NORMAL
PLATELET # BLD AUTO: 756 10*3/MM3 (ref 140–450)
PMV BLD AUTO: 7.9 FL (ref 6–12)
POIKILOCYTOSIS BLD QL SMEAR: NORMAL
POTASSIUM SERPL-SCNC: 3.7 MMOL/L (ref 3.5–5.2)
PROT SERPL-MCNC: 7 G/DL (ref 6–8.5)
RBC # BLD AUTO: 3.72 10*6/MM3 (ref 3.77–5.28)
SMALL PLATELETS BLD QL SMEAR: NORMAL
SODIUM SERPL-SCNC: 128 MMOL/L (ref 136–145)
TARGETS BLD QL SMEAR: NORMAL
WBC MORPH BLD: NORMAL
WBC NRBC COR # BLD: 15.8 10*3/MM3 (ref 3.4–10.8)

## 2023-08-17 PROCEDURE — 36591 DRAW BLOOD OFF VENOUS DEVICE: CPT | Performed by: INTERNAL MEDICINE

## 2023-08-17 PROCEDURE — 25510000001 IOPAMIDOL PER 1 ML: Performed by: INTERNAL MEDICINE

## 2023-08-17 PROCEDURE — 85007 BL SMEAR W/DIFF WBC COUNT: CPT

## 2023-08-17 PROCEDURE — 83735 ASSAY OF MAGNESIUM: CPT

## 2023-08-17 PROCEDURE — 80053 COMPREHEN METABOLIC PANEL: CPT

## 2023-08-17 PROCEDURE — 85025 COMPLETE CBC W/AUTO DIFF WBC: CPT

## 2023-08-17 PROCEDURE — 25010000002 HEPARIN LOCK FLUSH PER 10 UNITS: Performed by: INTERNAL MEDICINE

## 2023-08-17 PROCEDURE — 71275 CT ANGIOGRAPHY CHEST: CPT

## 2023-08-17 RX ORDER — SODIUM CHLORIDE 0.9 % (FLUSH) 0.9 %
10 SYRINGE (ML) INJECTION AS NEEDED
OUTPATIENT
Start: 2023-08-17

## 2023-08-17 RX ORDER — HEPARIN SODIUM (PORCINE) LOCK FLUSH IV SOLN 100 UNIT/ML 100 UNIT/ML
500 SOLUTION INTRAVENOUS AS NEEDED
OUTPATIENT
Start: 2023-08-17

## 2023-08-17 RX ORDER — NALOXONE HYDROCHLORIDE 0.4 MG/ML
0.4 INJECTION, SOLUTION INTRAMUSCULAR; INTRAVENOUS; SUBCUTANEOUS ONCE AS NEEDED
Qty: 1 ML | Refills: 0 | Status: SHIPPED | OUTPATIENT
Start: 2023-08-17

## 2023-08-17 RX ORDER — HEPARIN SODIUM (PORCINE) LOCK FLUSH IV SOLN 100 UNIT/ML 100 UNIT/ML
500 SOLUTION INTRAVENOUS AS NEEDED
Status: DISCONTINUED | OUTPATIENT
Start: 2023-08-17 | End: 2023-08-17 | Stop reason: HOSPADM

## 2023-08-17 RX ADMIN — IOPAMIDOL 53 ML: 755 INJECTION, SOLUTION INTRAVENOUS at 11:07

## 2023-08-17 RX ADMIN — HEPARIN 500 UNITS: 100 SYRINGE at 13:00

## 2023-08-17 NOTE — PROGRESS NOTES
Oncology NICOLE advised by RN of pt 02 needs.  Pt is stage IV lung cancer with hypoxia and 02 sats in low 80's at room air today.  Order obtained from MD and NICOLE facilitated referral to [facility]. They will reach out to pt for home delivery once authorized by insurance.    8-8=18-23  Cert of Medical need completed and faxed.    Pt advised via voice message.   pino

## 2023-08-17 NOTE — LETTER
August 17, 2023      To Whom It May Concern:    Trae Orly was present in the [facility] on 8/17/2023. He provided transportation to and from his grand-mother's doctors appointment. Please excuse him for his missed time. Let us know if you have questions or concerns.     Thank you for choosing [facility]

## 2023-08-21 ENCOUNTER — TELEPHONE (OUTPATIENT)
Dept: NUTRITION | Facility: HOSPITAL | Age: 58
End: 2023-08-21
Payer: MEDICARE

## 2023-08-21 NOTE — PROGRESS NOTES
Adult Outpatient Nutrition  Assessment    Patient Name:  Isabel Villalba  YOB: 1965  MRN: [id number]    Assessment Date:  8/21/2023    Comments: Follow-up call attempted without success. Receiving immunotherapy due to lung cancer w/liver and bone mets. Diabetic. Wt 114.3 lb--up/down (overall stable past 2-3 mon). Alb low at 2.2. Glucose 145. Left pt message praising pt for holding weight. Urged more protein in attempt to prevent further loss of muscle. Offered supplement assistance.                     Electronically signed by:  Venessa Desai RD  08/21/23 13:12 CDT

## 2023-08-24 ENCOUNTER — TELEPHONE (OUTPATIENT)
Dept: ONCOLOGY | Facility: CLINIC | Age: 58
End: 2023-08-24
Payer: MEDICARE

## 2023-08-24 NOTE — TELEPHONE ENCOUNTER
REQUEST FOR APPOINTMENT OR APPOINTMENT CHANGE    What is the appointment for: [facility]    Reason for appointment request or change: Patient cancelled appointments due to back issues - requesting reschedule    If rescheduling, when was the original appointment: 8/24/23    Requested day that works best: N/A     Additional notes: Patient requested phone call with new appointments

## 2023-08-28 ENCOUNTER — TELEPHONE (OUTPATIENT)
Dept: ONCOLOGY | Facility: CLINIC | Age: 58
End: 2023-08-28
Payer: MEDICARE

## 2023-08-28 ENCOUNTER — TELEPHONE (OUTPATIENT)
Dept: ONCOLOGY | Facility: HOSPITAL | Age: 58
End: 2023-08-28
Payer: MEDICARE

## 2023-08-28 NOTE — TELEPHONE ENCOUNTER
Patient called stating that she missed her appt today due to unbearable back pain and having an hour and fifteen minute drive to get here not being doable.  She is wanting to reschedule but is asking how far out she would be able to reschedule and it still be safe for her to get her infusion.  She states that at this time due to her back, she is not able to ride in the car for that long.

## 2023-08-28 NOTE — TELEPHONE ENCOUNTER
REQUEST FOR APPOINTMENT OR APPOINTMENT CHANGE    What is the appointment for: [facility]    Reason for appointment request or change: Patient stated she was awake all night due to back pain - she is unable to drive to [place]    If rescheduling, when was the original appointment: 8/28/23    Requested day that works best: N/A    Additional notes: Patient requesting phone call with new appointments  [phone]

## 2023-08-29 ENCOUNTER — TELEPHONE (OUTPATIENT)
Dept: ONCOLOGY | Facility: HOSPITAL | Age: 58
End: 2023-08-29
Payer: MEDICARE

## 2023-08-29 NOTE — TELEPHONE ENCOUNTER
Attempted to call patient to let her know what Dr. Duckworth had said in response to her question about rescheduling her appt.  No answer, left message to call back.

## 2023-08-31 ENCOUNTER — TELEPHONE (OUTPATIENT)
Dept: ONCOLOGY | Facility: CLINIC | Age: 58
End: 2023-08-31
Payer: MEDICARE

## 2023-08-31 NOTE — TELEPHONE ENCOUNTER
Pt called and stated has fracture in her back and made aware of fracture by Dr. Duckworth. Pt stated Dr. Duckworth told her there is nothing that can be done about it. Pt stated at this time unable to withstand the car ride to [place] due to the fracture/pain. Pt made aware Dr. Duckworth recommends restarting treatment. Pt stated agreeable to restart Keytruda but does not want bone infusion. Pt stated using walker to walk. Pt stated will contact [facility] when able to ride in car and make trip to [place].

## 2023-08-31 NOTE — TELEPHONE ENCOUNTER
Reason for call: Patient called/returning call - stated she cannot do any appointments right now until she is able to walk - also stated she has been sick for 3 days and unable to get out of bed to return phone calls    The reason is related to: Returning telephone calls    Best phone number to return call: [phone]

## 2023-09-05 DIAGNOSIS — G89.3 CANCER RELATED PAIN: ICD-10-CM

## 2023-09-05 DIAGNOSIS — R05.9 COUGH: ICD-10-CM

## 2023-09-05 RX ORDER — BENZONATATE 200 MG/1
200 CAPSULE ORAL 3 TIMES DAILY PRN
Qty: 30 CAPSULE | Refills: 2 | Status: SHIPPED | OUTPATIENT
Start: 2023-09-05

## 2023-09-05 RX ORDER — OXYCODONE 27 MG/1
1 CAPSULE, EXTENDED RELEASE ORAL EVERY 12 HOURS
Qty: 60 EACH | Refills: 0 | Status: SHIPPED | OUTPATIENT
Start: 2023-09-05 | End: 2023-09-07

## 2023-09-07 DIAGNOSIS — G89.3 CANCER RELATED PAIN: ICD-10-CM

## 2023-09-11 ENCOUNTER — PRIOR AUTHORIZATION (OUTPATIENT)
Dept: FAMILY MEDICINE CLINIC | Facility: CLINIC | Age: 58
End: 2023-09-11
Payer: MEDICARE

## 2023-09-11 DIAGNOSIS — E78.5 HYPERLIPIDEMIA, UNSPECIFIED HYPERLIPIDEMIA TYPE: ICD-10-CM

## 2023-09-11 RX ORDER — EVOLOCUMAB 140 MG/ML
140 INJECTION, SOLUTION SUBCUTANEOUS
Qty: 2 ML | Refills: 5 | Status: SHIPPED | OUTPATIENT
Start: 2023-09-11

## 2023-09-11 NOTE — TELEPHONE ENCOUNTER
PA for Repatha has been sent to [facility]. Ref PA key [id number]. DX used E78.5 Hyperlipidemia          NOTE:    Authorization already on file for this request. Authorization starting on 01/01/2022 and ending on 12/31/2023.

## 2023-09-13 DIAGNOSIS — C34.91 NON-SMALL CELL CANCER OF RIGHT LUNG: ICD-10-CM

## 2023-09-13 DIAGNOSIS — C79.51 METASTASIS TO BONE: ICD-10-CM

## 2023-09-13 DIAGNOSIS — Z79.899 OTHER LONG TERM (CURRENT) DRUG THERAPY: ICD-10-CM

## 2023-09-13 DIAGNOSIS — E83.42 HYPOMAGNESEMIA: Primary | ICD-10-CM

## 2023-09-20 ENCOUNTER — TELEPHONE (OUTPATIENT)
Dept: FAMILY MEDICINE CLINIC | Facility: CLINIC | Age: 58
End: 2023-09-20
Payer: MEDICARE

## 2023-09-20 DIAGNOSIS — G89.3 CANCER RELATED PAIN: Primary | ICD-10-CM

## 2023-09-20 NOTE — TELEPHONE ENCOUNTER
The roxicodone and oxycodone is not controlling her pain at all. She said her hips/legs/knees/ankles/feet is absolutely killing her. Wondering what she can do

## 2023-09-21 NOTE — TELEPHONE ENCOUNTER
Radha Simental MD  You15 hours ago (5:49 PM)      Tell her to double up on the immediate release Roxicodone's to 30 mg every 4 hours and see if this works.  If it does I will send in a new prescription.

## 2023-09-22 ENCOUNTER — TELEPHONE (OUTPATIENT)
Dept: RADIATION ONCOLOGY | Facility: HOSPITAL | Age: 58
End: 2023-09-22
Payer: MEDICARE

## 2023-09-22 NOTE — TELEPHONE ENCOUNTER
Attempted to contact pt to offer assistance in rescheduling missed appts. Detailed VM left with Navigator call back information provided.

## 2023-09-26 ENCOUNTER — PATIENT OUTREACH (OUTPATIENT)
Dept: ONCOLOGY | Facility: CLINIC | Age: 58
End: 2023-09-26
Payer: MEDICARE

## 2023-09-26 DIAGNOSIS — G89.3 CANCER RELATED PAIN: ICD-10-CM

## 2023-09-26 NOTE — PROGRESS NOTES
Again attempted to contact pt without answer. Reached out to patient's daughter who states pt is not doing well. Now bed/WC bound. States pt is unable to travel for appts. Per daughter, pt is unwilling to discuss palliative care or hospice. States pt is being controlled by PCP at this time. Daughter states she would be supportive of hospice but will respect pt's wishes. States pt does have home O2 and hospital bed. Emotional support provided to daughter.Encouraged daughter to reach out for needs or concerns. Provided daughter with Navigator contact information and encouraged to reach out. Daughter verbalizes understanding.

## 2023-09-27 DIAGNOSIS — G89.3 CANCER RELATED PAIN: ICD-10-CM

## 2023-09-27 RX ORDER — OXYCODONE HYDROCHLORIDE 15 MG/1
15 TABLET ORAL EVERY 4 HOURS PRN
Qty: 150 TABLET | Refills: 0 | Status: SHIPPED | OUTPATIENT
Start: 2023-09-27
